# Patient Record
Sex: FEMALE | Race: WHITE | NOT HISPANIC OR LATINO | Employment: OTHER | URBAN - NONMETROPOLITAN AREA
[De-identification: names, ages, dates, MRNs, and addresses within clinical notes are randomized per-mention and may not be internally consistent; named-entity substitution may affect disease eponyms.]

---

## 2017-02-01 DIAGNOSIS — D05.11 DUCTAL CARCINOMA IN SITU OF RIGHT BREAST: ICD-10-CM

## 2017-02-01 DIAGNOSIS — E55.9 VITAMIN D DEFICIENCY: ICD-10-CM

## 2017-02-01 DIAGNOSIS — Z71.89 ACP (ADVANCE CARE PLANNING): ICD-10-CM

## 2017-02-01 DIAGNOSIS — E53.8 VITAMIN B12 DEFICIENCY (NON ANEMIC): ICD-10-CM

## 2017-02-01 DIAGNOSIS — D05.01 LOBULAR CARCINOMA IN SITU OF RIGHT BREAST: ICD-10-CM

## 2017-02-01 LAB
BASOPHILS # BLD AUTO: 0 10E9/L (ref 0–0.2)
BASOPHILS NFR BLD AUTO: 0.3 %
DIFFERENTIAL METHOD BLD: NORMAL
EOSINOPHIL # BLD AUTO: 0.1 10E9/L (ref 0–0.7)
EOSINOPHIL NFR BLD AUTO: 1.6 %
ERYTHROCYTE [DISTWIDTH] IN BLOOD BY AUTOMATED COUNT: 12.9 % (ref 10–15)
HCT VFR BLD AUTO: 39.4 % (ref 35–47)
HGB BLD-MCNC: 12.8 G/DL (ref 11.7–15.7)
LYMPHOCYTES # BLD AUTO: 3.2 10E9/L (ref 0.8–5.3)
LYMPHOCYTES NFR BLD AUTO: 41.8 %
MCH RBC QN AUTO: 29.3 PG (ref 26.5–33)
MCHC RBC AUTO-ENTMCNC: 32.5 G/DL (ref 31.5–36.5)
MCV RBC AUTO: 90 FL (ref 78–100)
MONOCYTES # BLD AUTO: 0.7 10E9/L (ref 0–1.3)
MONOCYTES NFR BLD AUTO: 9 %
NEUTROPHILS # BLD AUTO: 3.6 10E9/L (ref 1.6–8.3)
NEUTROPHILS NFR BLD AUTO: 47.3 %
PLATELET # BLD AUTO: 295 10E9/L (ref 150–450)
RBC # BLD AUTO: 4.37 10E12/L (ref 3.8–5.2)
WBC # BLD AUTO: 7.6 10E9/L (ref 4–11)

## 2017-02-01 PROCEDURE — 80053 COMPREHEN METABOLIC PANEL: CPT | Performed by: NURSE PRACTITIONER

## 2017-02-01 PROCEDURE — 99000 SPECIMEN HANDLING OFFICE-LAB: CPT | Performed by: NURSE PRACTITIONER

## 2017-02-01 PROCEDURE — 36415 COLL VENOUS BLD VENIPUNCTURE: CPT | Performed by: NURSE PRACTITIONER

## 2017-02-01 PROCEDURE — 83615 LACTATE (LD) (LDH) ENZYME: CPT | Performed by: NURSE PRACTITIONER

## 2017-02-01 PROCEDURE — 86300 IMMUNOASSAY TUMOR CA 15-3: CPT | Mod: 90 | Performed by: NURSE PRACTITIONER

## 2017-02-01 PROCEDURE — 85025 COMPLETE CBC W/AUTO DIFF WBC: CPT | Performed by: NURSE PRACTITIONER

## 2017-02-02 LAB
ALBUMIN SERPL-MCNC: 4.3 G/DL (ref 3.4–5)
ALP SERPL-CCNC: 39 U/L (ref 40–150)
ALT SERPL W P-5'-P-CCNC: 28 U/L (ref 0–50)
ANION GAP SERPL CALCULATED.3IONS-SCNC: 10 MMOL/L (ref 3–14)
AST SERPL W P-5'-P-CCNC: 14 U/L (ref 0–45)
BILIRUB SERPL-MCNC: 0.5 MG/DL (ref 0.2–1.3)
BUN SERPL-MCNC: 28 MG/DL (ref 7–30)
CALCIUM SERPL-MCNC: 9 MG/DL (ref 8.5–10.1)
CANCER AG27-29 SERPL-ACNC: 26 U/ML (ref 0–39)
CHLORIDE SERPL-SCNC: 104 MMOL/L (ref 94–109)
CO2 SERPL-SCNC: 24 MMOL/L (ref 20–32)
CREAT SERPL-MCNC: 0.78 MG/DL (ref 0.52–1.04)
GFR SERPL CREATININE-BSD FRML MDRD: 76 ML/MIN/1.7M2
GLUCOSE SERPL-MCNC: 98 MG/DL (ref 70–99)
LDH SERPL L TO P-CCNC: 195 U/L (ref 81–234)
POTASSIUM SERPL-SCNC: 4.1 MMOL/L (ref 3.4–5.3)
PROT SERPL-MCNC: 8.4 G/DL (ref 6.8–8.8)
SODIUM SERPL-SCNC: 138 MMOL/L (ref 133–144)

## 2017-02-09 ENCOUNTER — ONCOLOGY VISIT (OUTPATIENT)
Dept: ONCOLOGY | Facility: OTHER | Age: 57
End: 2017-02-09
Attending: NURSE PRACTITIONER
Payer: COMMERCIAL

## 2017-02-09 VITALS
OXYGEN SATURATION: 98 % | TEMPERATURE: 98.9 F | BODY MASS INDEX: 47.09 KG/M2 | HEIGHT: 66 IN | DIASTOLIC BLOOD PRESSURE: 92 MMHG | RESPIRATION RATE: 18 BRPM | HEART RATE: 95 BPM | WEIGHT: 293 LBS | SYSTOLIC BLOOD PRESSURE: 132 MMHG

## 2017-02-09 DIAGNOSIS — R10.32 LLQ ABDOMINAL PAIN: ICD-10-CM

## 2017-02-09 DIAGNOSIS — Z51.81 ENCOUNTER FOR MONITORING TAMOXIFEN THERAPY: ICD-10-CM

## 2017-02-09 DIAGNOSIS — D05.01 LOBULAR CARCINOMA IN SITU OF RIGHT BREAST: Primary | ICD-10-CM

## 2017-02-09 DIAGNOSIS — D05.11 DUCTAL CARCINOMA IN SITU OF RIGHT BREAST: ICD-10-CM

## 2017-02-09 DIAGNOSIS — R10.31 RLQ ABDOMINAL PAIN: ICD-10-CM

## 2017-02-09 DIAGNOSIS — F33.8 SEASONAL AFFECTIVE DISORDER (H): ICD-10-CM

## 2017-02-09 DIAGNOSIS — Z79.810 ENCOUNTER FOR MONITORING TAMOXIFEN THERAPY: ICD-10-CM

## 2017-02-09 PROCEDURE — 99214 OFFICE O/P EST MOD 30 MIN: CPT | Performed by: NURSE PRACTITIONER

## 2017-02-09 RX ORDER — ESCITALOPRAM OXALATE 10 MG/1
10 TABLET ORAL DAILY
Qty: 90 TABLET | Refills: 1 | Status: SHIPPED | OUTPATIENT
Start: 2017-02-09 | End: 2017-08-04

## 2017-02-09 ASSESSMENT — PAIN SCALES - GENERAL: PAINLEVEL: NO PAIN (0)

## 2017-02-09 NOTE — MR AVS SNAPSHOT
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   We will call with results before visit.  Start lexapro 10 mg daily.  Please get GYN exam before visit. When you are in need of a refill, please call your pharmacy and they will send us a request. If you have any questions please call [phone]        Follow-ups after your visit        Who to contact     If you have questions or need follow up information about today's clinic visit or your schedule please contact [facility] directly at [phone].  Normal or non-critical lab and imaging results will be communicated to you by Dashlane[facility], letter or phone within 4 business days after the clinic has received the results. If you do not hear from us within 7 days, please contact the clinic through [facility]Nowt or phone. If you have a critical or abnormal lab result, we will notify you by phone as soon as possible.  Submit refill requests through [facility] or call your pharmacy and they will forward the refill request to us. Please allow 3 business days for your refill to be completed.          Additional Information About Your Visit        [facility] Medical Information     [employer] [facility] lets you send messages to your doctor, view your test results, renew your prescriptions, schedule appointments and more. To sign up, go to [email or web address] . Click on \"Log in\" on the left side " "of the screen, which will take you to the Welcome page. Then click on \"Sign up Now\" on the right side of the page.     You will be asked to enter the access code listed below, as well as some personal information. Please follow the directions to create your username and password.     Your access code is: [email or web address]  Expires: 5/10/2017 10:47 AM     Your access code will  in 90 days. If you need help or a new code, please call your [facility] or [phone].        Care EveryWhere ID     This is your Care EveryWhere ID. This could be used by other organizations to access your [facility] medical records  [id number]        Your Vitals Were     Pulse Temperature Respirations Height BMI (Body Mass Index) Pulse Oximetry    95 98.9  F (37.2  C) (Tympanic) 18 1.676 m (5' 6\") 47.80 kg/m2 98%       Blood Pressure from Last 3 Encounters:   17 132/92   16 126/72   16 138/74    Weight from Last 3 Encounters:   17 134.265 kg (296 lb)   16 134.31 kg (296 lb 1.6 oz)   16 136.079 kg (300 lb)              We Performed the Following     CT Abdomen Pelvis w Contrast     US Pelvic Complete with Transvaginal          Today's Medication Changes          These changes are accurate as of: 17 10:48 AM.  If you have any questions, ask your nurse or doctor.               Start taking these medicines.        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  [address] [place] [id number]     Phone:  [phone]    - escitalopram 10 MG tablet             Primary Care Provider Office Phone # Fax #    Rivkato Blanco [phone]       [facility] [address] [place] [id number]        Thank you!     Thank you for choosing [facility]  for your care. Our goal is always to provide you with excellent care. Hearing back from our patients is one way we can continue to improve our services. Please take a few minutes to complete the written survey that you may receive in the mail after your visit with us. Thank you!             Your Updated Medication List - Protect others around you: Learn how to safely use, store and throw away your medicines at www.disposemymeds.org.          This list is accurate as of: 2/9/17 10:48 AM.  Always use your most recent med list.                   Brand Name Dispense Instructions for use    * albuterol 108 (90 BASE) MCG/ACT Inhaler    PROAIR HFA/PROVENTIL HFA/VENTOLIN HFA    1 Inhaler    Inhale 2 puffs into the lungs every 4 hours as needed for shortness of breath / dyspnea or wheezing       * albuterol (2.5 MG/3ML) 0.083% neb solution     1 Box    Take 1 vial (2.5 mg) by nebulization every 4 hours as needed for shortness of breath / dyspnea or wheezing       ASPIRIN PO      Take 81 mg by mouth daily       calcium-vitamin D 600-400 MG-UNIT per tablet    CALTRATE     Take 1 tablet by mouth 2 times daily       CARAFATE 1 GM tablet   Generic drug:  sucralfate      1 g 4 times daily as needed Take one tablet (1g) by oral route 4 times every day on an empty stomach one hour before meals and at bedtime       CVS vitamin  B12 1000 MCG Tabs   Generic drug:  cyanocobalamin     30 tablet    Take 1,000 mcg by mouth daily       DAILY MULTIVITAMIN PO      Take one tablet by oral route every day with food       doxylamine 25 MG Tabs tablet    UNISOM     Take 25 mg by mouth nightly as  needed       escitalopram 10 MG tablet    LEXAPRO    90 tablet    Take 1 tablet (10 mg) by mouth daily       folic acid 1 MG tablet    FOLVITE    90 tablet    Take 1 tablet (1 mg) by mouth daily       lisinopril 5 MG tablet    PRINIVIL/ZESTRIL    90 tablet    Take 1 Tab by mouth one time a day.       nystatin 316501 UNIT/GM Powd    MYCOSTATIN    60 g    Apply 100,000 g topically 2 times daily as needed       OMEGA 3 PO      Take 1,000 mg by mouth daily       tamoxifen 20 MG tablet    NOLVADEX    90 tablet    Take 1 Tab by mouth one time a day.       VITAMIN D (CHOLECALCIFEROL) PO      Take 5,000 Units by mouth daily       * Notice:  This list has 2 medication(s) that are the same as other medications prescribed for you. Read the directions carefully, and ask your doctor or other care provider to review them with you.

## 2017-02-09 NOTE — PROGRESS NOTES
Oncology Follow-up Visit:  February 9, 2017    Reason for Visit:  Patient presents with:  RECHECK: Follow up DCIS  *_* Health Care Directive *_*: Patient has paperwork but it has not been filled out yet     Nursing Note and documentation reviewed: yes    HPI:  This is a 56-year-old female patient who presents to the oncology/hematology clinic today in followup of breast cancer. Last visit was August 19, 2014. Patient was diagnosed with DCIS and LCIS in January 2014 and had a lumpectomy performed in February 2014. She was placed on tamoxifen 20 mg daily in March of 2014.   She continues on this.  She presents to the clinic today with complaints of lower abdominal cramping over the past month.  She does not associate this with eating or change in her stool habits and she denies any blood in her stool or rectal bleeding.  She denies vaginal bleeding or difficulty with urination and denies fevers.  Last GYN exam was completed January 2016 and was normal.  In addition, patient states she is feeling very emotional at this time.  She tells me she cries.  easily and is unsure why.  She denies any current life stressors or situations that would be contributing to her emotional lability.  She is currently not any type of antidepressant or antianxiety medication and states she remembers a time in the past with this was recommended that she did not start any therapy.  Mammogram was completed in December and was normal.      Oncologic History: Vandana had undergone an abnormal mammogram in November 2013 which showed microcalcifications suspicious for malignancy. She subsequently underwent stereotactic biopsy on November 21, 2013 with original interpretation of histopathology being that of hyperplasia without atypia. This was then over read as part of routine protocol in January 2014. 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 She was evaluated by Dr. Shanika Beaver in Gen. Surgery on January 28,  2014 and bilateral MRI of the breast was obtained which revealed oval-shaped nodular densities in both breasts consistent with inframammary lymph nodes.     The patient was seen by Dr. Chávez of radiation oncology on 1/31/2014 and his impression was that the patient had DCIS and would require definitive surgery.    The patient underwent excisional breast biopsy on 2/13/2014 and pathology showed LCIS, multifocal, completely excised and low-grade, grade 1, DCIS which was completely excised. Tumor size was at least 0.5 cm DCIS grade 1; margins were uninvolved by DCIS. Estrogen receptor was positive at 90% and progesterone receptor was positive at 50%. Patient was staged pathologic Tis associated with LCIS. She was started on tamoxifen.    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 1/28/2014     Ductal carcinoma in situ of right breast 1/28/2014     Other B-complex deficiencies 8/19/2014     Benign essential hypertension 11/24/2015     Vitamin D deficiency 1/26/2016     Primary central sleep apnea 1/26/2016       Past Surgical History   Procedure Laterality Date     Biopsy       right breast--neg     Orthopedic surgery       heel surgery     Orthopedic surgery       rt knee meniiscus tear     Cholecystectomy  2010     Colonoscopy  2009     Biopsy breast needle localization  2/13/2014     Procedure: BIOPSY BREAST NEEDLE LOCALIZATION;  WIRE LOCALIZED SEGMENTAL RESECTION, RIGHT BREAST;  Surgeon: Shanika Beaver MD;  Location: [facility] OR     Colonoscopy  11/12/2013        Family History   Problem Relation Age of Onset     C.A.D. Father      Thyroid Disease Other      DIABETES No family hx of      Asthma No family hx of        Social History     Social History     Marital Status: Single     Spouse Name: N/A     Number of Children: N/A     Years of Education: N/A     Occupational History     Not on file.     Social History Main Topics     Smoking status: Former Smoker -- 0.10 packs/day for 30 years     Types: Cigarettes     Quit date: 03/03/2013     Smokeless tobacco: Never Used     Alcohol Use: Yes     Drug Use: No     Sexual Activity:     Partners: Male     Other Topics Concern     Parent/Sibling W/ Cabg, Mi Or Angioplasty Before 65f 55m? No     Social History Narrative       Current Outpatient Prescriptions   Medication     doxylamine (UNISOM) 25 MG TABS tablet     escitalopram (LEXAPRO) 10 MG tablet     tamoxifen (NOLVADEX) 20 MG tablet     ASPIRIN PO     cyanocobalamin (CVS VITAMIN  B12) 1000 MCG TABS     lisinopril (PRINIVIL,ZESTRIL) 5 MG tablet     albuterol (PROAIR HFA, PROVENTIL HFA, VENTOLIN HFA) 108 (90 BASE) MCG/ACT inhaler     albuterol (2.5 MG/3ML) 0.083% nebulizer solution     calcium-vitamin D (CALTRATE) 600-400 MG-UNIT per tablet     Omega-3 Fatty Acids (OMEGA 3 PO)     VITAMIN D, CHOLECALCIFEROL, PO     Multiple Vitamin (DAILY MULTIVITAMIN PO)     nystatin (MYCOSTATIN) 127165 UNIT/GM POWD     folic acid (FOLVITE) 1 MG tablet     sucralfate (CARAFATE) 1 GM tablet     No current facility-administered medications for this visit.        Allergies   Allergen Reactions     Adhesive Tape      Steri strips left scar.       Review Of Systems:  Constitutional: denies fever, weight changes, chills, and night sweats.  Eyes: denies blurred or double vision  Ears/Nose/Throat: denies ear pain, nose problems, difficulty swallowing  Respiratory: denies shortness of breath, cough   Skin: denies rash, lesions  Breast/Chest wall: denies pain, lumps or discharge  Cardiovascular:  "denies chest pain, palpitations, edema  Gastrointestinal: see hPI  Genitourinary: denies difficulty with urination, blood in urine  Musculoskeletal:denies new muscle pain, bone pain  Neurologic: denies lightheadedness, headaches, numbness or tingling  Psychiatric: see HPI  Hematologic/Lymphatic/Immunologic: denies easy bruising, easy bleeding, lumps or bumps noted  Endocrine: Denies increased thirst; occasional hot flash    Physical Exam:  /92 mmHg  Pulse 95  Temp(Src) 98.9  F (37.2  C) (Tympanic)  Resp 18  Ht 1.676 m (5' 6\")  Wt 134.265 kg (296 lb)  BMI 47.80 kg/m2  SpO2 98%    GENERAL APPEARANCE: Healthy, alert and in no acute distress.    HEENT: Normocephalic, Sclerae anicteric. Oropharynx without ulcers, lesions, or thrush.  NECK: Supple. No asymmetry or masses, no thyromegaly.  LYMPHATICS: No palpable cervical, supraclavicular, axillary, or inguinal nodes   RESP: Lungs clear to auscultation bilaterally, respirations regular and easy  CARDIOVASCULAR: Regular rate and rhythm. Normal S1, S2  ABDOMEN: Soft, nontender. Bowel sounds auscultated all 4 quadrants. No palpable organomegaly or masses.  BREAST/Chest wall: no concerning lumps, masses or tenderness bilaterally; breasts are large and dense  MUSCULOSKELETAL: Extremities without gross deformities noted. No edema of bilateral lower extremities.  SKIN: No suspicious lesions or rashes.  NEURO: Alert and oriented x 3.  Gait steady.  PSYCHIATRIC: Mentation and affect appear normal.  Mood appropriate.  Teary eyed at times during visit.    Laboratory:  Results for orders placed or performed in visit on 02/01/17   Ca27.29  breast tumor marker   Result Value Ref Range    CA 27-29 26 0 - 39 U/mL   CBC with platelets differential   Result Value Ref Range    WBC 7.6 4.0 - 11.0 10e9/L    RBC Count 4.37 3.8 - 5.2 10e12/L    Hemoglobin 12.8 11.7 - 15.7 g/dL    Hematocrit 39.4 35.0 - 47.0 %    MCV 90 78 - 100 fl    MCH 29.3 26.5 - 33.0 pg    MCHC 32.5 31.5 - 36.5 " g/dL    RDW 12.9 10.0 - 15.0 %    Platelet Count 295 150 - 450 10e9/L    Diff Method Automated Method     % Neutrophils 47.3 %    % Lymphocytes 41.8 %    % Monocytes 9.0 %    % Eosinophils 1.6 %    % Basophils 0.3 %    Absolute Neutrophil 3.6 1.6 - 8.3 10e9/L    Absolute Lymphocytes 3.2 0.8 - 5.3 10e9/L    Absolute Monocytes 0.7 0.0 - 1.3 10e9/L    Absolute Eosinophils 0.1 0.0 - 0.7 10e9/L    Absolute Basophils 0.0 0.0 - 0.2 10e9/L   Comprehensive metabolic panel   Result Value Ref Range    Sodium 138 133 - 144 mmol/L    Potassium 4.1 3.4 - 5.3 mmol/L    Chloride 104 94 - 109 mmol/L    Carbon Dioxide 24 20 - 32 mmol/L    Anion Gap 10 3 - 14 mmol/L    Glucose 98 70 - 99 mg/dL    Urea Nitrogen 28 7 - 30 mg/dL    Creatinine 0.78 0.52 - 1.04 mg/dL    GFR Estimate 76 >60 mL/min/1.7m2    GFR Estimate If Black >90   GFR Calc   >60 mL/min/1.7m2    Calcium 9.0 8.5 - 10.1 mg/dL    Bilirubin Total 0.5 0.2 - 1.3 mg/dL    Albumin 4.3 3.4 - 5.0 g/dL    Protein Total 8.4 6.8 - 8.8 g/dL    Alkaline Phosphatase 39 (L) 40 - 150 U/L    ALT 28 0 - 50 U/L    AST 14 0 - 45 U/L   Lactate Dehydrogenase   Result Value Ref Range    Lactate Dehydrogenase 195 81 - 234 U/L       Imaging Studies:      BILATERAL SCREENING MAMMOGRAM WITH TOMOSYNTHESIS     REPORT: Scattered fibroglandular elements are noted. Tomographic  evaluation of both breasts reveals architectural distortion in the  right breast at the site of excisional biopsy.  Otherwise there are no  dominant masses or malignant calcifications seen. Comparison is made  with previous examination in 2015 reveals decreasing density at the  surgery site as compared to the previous examination.     IMPRESSION: BENIGN FINDINGS.  ANNUAL FOLLOW UP IS RECOMMENDED.     BI-RADS CATEGORY: 2 - Benign finding.     BREAST DENSITY: B - There are scattered areas of fibroglandular  density.     The examination was reviewed with R2 CAD.  Exam Date: Dec 28, 2016 02:11:31 PM  Author: RHONDA  MILAGROS  This report is final and null      ASSESSMENT/PLAN:    #1 Breast cancer: Ductal carcinoma in situ of the right breast and lobular carcinoma in situ of the right breast diagnosed January 2014. Patient underwent lumpectomy. She was then placed on tamoxifen 20mg daily and patient continues on this to date. We will follow up in 6 months with a CBC, CMP, LDH, CA 27.29.  Patient will have her screening mammogram in December 2017.    #2 Use of Tamoxifen: I did discuss the importance of obtaining a GYN exam (Bi-manual) on a yearly basis related to the tamoxifen use.   She will schedule this with Jackie Hitchcock CNP.   Instructed to seek evaluation with any vaginal bleeding.      #3  Abdominal pain:  Will obtain a UA and if negative we will get a vaginal ultrasound along with abdominal CT scan.  I will call her with the results and plan.    #4  SAD:  Her symptoms seem consistent with seasonal affective disorder.  Extensive time spent in discussion of this and treatment.  She will start Lexapro 10mg daily.  I explained that I would like her to follow up in 6 weeks and informed her I king talk with her PCP to see if she would be willing to follow up on this when she sees her for her annual exam.    I encouraged patient to call with any questions or concerns.      Matilde Patrick  FNP-BC

## 2017-02-09 NOTE — PATIENT INSTRUCTIONS
We would like to see you back after vaginal ultrasound and abdomen and pelvis CT results.   We will call with results before visit.  Start lexapro 10 mg daily.  Please get GYN exam before visit. When you are in need of a refill, please call your pharmacy and they will send us a request. If you have any questions please call [phone]

## 2017-02-10 DIAGNOSIS — R10.32 LLQ ABDOMINAL PAIN: ICD-10-CM

## 2017-02-10 DIAGNOSIS — F33.8 SEASONAL AFFECTIVE DISORDER (H): ICD-10-CM

## 2017-02-10 DIAGNOSIS — D05.11 DUCTAL CARCINOMA IN SITU OF RIGHT BREAST: ICD-10-CM

## 2017-02-10 DIAGNOSIS — Z79.810 ENCOUNTER FOR MONITORING TAMOXIFEN THERAPY: ICD-10-CM

## 2017-02-10 DIAGNOSIS — R10.31 RLQ ABDOMINAL PAIN: ICD-10-CM

## 2017-02-10 DIAGNOSIS — D05.01 LOBULAR CARCINOMA IN SITU OF RIGHT BREAST: ICD-10-CM

## 2017-02-10 DIAGNOSIS — Z51.81 ENCOUNTER FOR MONITORING TAMOXIFEN THERAPY: ICD-10-CM

## 2017-02-10 LAB
ALBUMIN UR-MCNC: NEGATIVE MG/DL
APPEARANCE UR: CLEAR
BILIRUB UR QL STRIP: NEGATIVE
COLOR UR AUTO: YELLOW
GLUCOSE UR STRIP-MCNC: NEGATIVE MG/DL
HGB UR QL STRIP: NEGATIVE
KETONES UR STRIP-MCNC: NEGATIVE MG/DL
LEUKOCYTE ESTERASE UR QL STRIP: NEGATIVE
NITRATE UR QL: NEGATIVE
NON-SQ EPI CELLS #/AREA URNS LPF: NORMAL /LPF
PH UR STRIP: 5 PH (ref 5–7)
RBC #/AREA URNS AUTO: NORMAL /HPF (ref 0–2)
SP GR UR STRIP: >1.03 (ref 1–1.03)
URN SPEC COLLECT METH UR: NORMAL
UROBILINOGEN UR STRIP-ACNC: 0.2 EU/DL (ref 0.2–1)
WBC #/AREA URNS AUTO: NORMAL /HPF (ref 0–2)

## 2017-02-10 PROCEDURE — 81001 URINALYSIS AUTO W/SCOPE: CPT | Performed by: NURSE PRACTITIONER

## 2017-02-10 ASSESSMENT — PATIENT HEALTH QUESTIONNAIRE - PHQ9: SUM OF ALL RESPONSES TO PHQ QUESTIONS 1-9: 7

## 2017-02-14 ENCOUNTER — TELEPHONE (OUTPATIENT)
Dept: ONCOLOGY | Facility: OTHER | Age: 57
End: 2017-02-14

## 2017-02-14 DIAGNOSIS — D05.01 LOBULAR CARCINOMA IN SITU OF RIGHT BREAST: ICD-10-CM

## 2017-02-14 DIAGNOSIS — Z79.810 USE OF TAMOXIFEN (NOLVADEX): ICD-10-CM

## 2017-02-14 DIAGNOSIS — R93.89 ENDOMETRIAL THICKENING ON ULTRA SOUND: Primary | ICD-10-CM

## 2017-02-14 DIAGNOSIS — D05.11 DUCTAL CARCINOMA IN SITU OF RIGHT BREAST: ICD-10-CM

## 2017-02-16 DIAGNOSIS — D05.11 DUCTAL CARCINOMA IN SITU OF RIGHT BREAST: ICD-10-CM

## 2017-02-17 RX ORDER — TAMOXIFEN CITRATE 20 MG/1
TABLET ORAL
Qty: 90 TABLET | Refills: 1 | Status: SHIPPED | OUTPATIENT
Start: 2017-02-17 | End: 2017-08-11

## 2017-03-06 ENCOUNTER — TRANSFERRED RECORDS (OUTPATIENT)
Dept: HEALTH INFORMATION MANAGEMENT | Facility: HOSPITAL | Age: 57
End: 2017-03-06

## 2017-03-06 LAB
HPV ABSTRACT: NORMAL
PAP-ABSTRACT: NORMAL

## 2017-03-14 DIAGNOSIS — D05.11 DUCTAL CARCINOMA IN SITU OF RIGHT BREAST: ICD-10-CM

## 2017-03-17 ENCOUNTER — TRANSFERRED RECORDS (OUTPATIENT)
Dept: HEALTH INFORMATION MANAGEMENT | Facility: HOSPITAL | Age: 57
End: 2017-03-17

## 2017-03-22 ENCOUNTER — TRANSFERRED RECORDS (OUTPATIENT)
Dept: HEALTH INFORMATION MANAGEMENT | Facility: HOSPITAL | Age: 57
End: 2017-03-22

## 2017-04-19 DIAGNOSIS — I10 BENIGN ESSENTIAL HYPERTENSION: ICD-10-CM

## 2017-04-19 NOTE — TELEPHONE ENCOUNTER
lisinopril      Last Written Prescription Date: 1/26/16  Last Fill Quantity: 90, # refills: 3  Last Office Visit with G, P or [facility] prescribing provider: 2/9/17       Potassium   Date Value Ref Range Status   02/01/2017 4.1 3.4 - 5.3 mmol/L Final     Creatinine   Date Value Ref Range Status   02/01/2017 0.78 0.52 - 1.04 mg/dL Final     BP Readings from Last 3 Encounters:   02/09/17 (!) 132/92   08/09/16 126/72   02/09/16 138/74

## 2017-04-20 RX ORDER — LISINOPRIL 5 MG/1
TABLET ORAL
Qty: 90 TABLET | Refills: 2 | Status: SHIPPED | OUTPATIENT
Start: 2017-04-20 | End: 2017-12-29

## 2017-04-26 ENCOUNTER — TELEPHONE (OUTPATIENT)
Dept: ONCOLOGY | Facility: OTHER | Age: 57
End: 2017-04-26

## 2017-04-26 NOTE — TELEPHONE ENCOUNTER
Called and LM for pt to call back.Pt was to have a 6 month follow up and will schedule if needed when pt returns phone call.    Milly Horne

## 2017-06-04 ENCOUNTER — TELEPHONE (OUTPATIENT)
Dept: INFUSION THERAPY | Facility: OTHER | Age: 57
End: 2017-06-04

## 2017-06-05 NOTE — TELEPHONE ENCOUNTER
Please call and see if patient is planning to follow up in August 2017.  I don't see that any appointment was made.  Let me know when she is on the schedule as she will need labs 1 week before in [facility].

## 2017-08-01 DIAGNOSIS — Z79.810 ENCOUNTER FOR MONITORING TAMOXIFEN THERAPY: ICD-10-CM

## 2017-08-01 DIAGNOSIS — R10.31 RLQ ABDOMINAL PAIN: ICD-10-CM

## 2017-08-01 DIAGNOSIS — D05.11 DUCTAL CARCINOMA IN SITU OF RIGHT BREAST: ICD-10-CM

## 2017-08-01 DIAGNOSIS — Z51.81 ENCOUNTER FOR MONITORING TAMOXIFEN THERAPY: ICD-10-CM

## 2017-08-01 DIAGNOSIS — F33.8 SEASONAL AFFECTIVE DISORDER (H): ICD-10-CM

## 2017-08-01 DIAGNOSIS — R10.32 LLQ ABDOMINAL PAIN: ICD-10-CM

## 2017-08-01 DIAGNOSIS — D05.01 LOBULAR CARCINOMA IN SITU OF RIGHT BREAST: ICD-10-CM

## 2017-08-01 LAB
BASOPHILS # BLD AUTO: 0 10E9/L (ref 0–0.2)
BASOPHILS NFR BLD AUTO: 0.3 %
DIFFERENTIAL METHOD BLD: NORMAL
EOSINOPHIL # BLD AUTO: 0.1 10E9/L (ref 0–0.7)
EOSINOPHIL NFR BLD AUTO: 2 %
ERYTHROCYTE [DISTWIDTH] IN BLOOD BY AUTOMATED COUNT: 12.7 % (ref 10–15)
HCT VFR BLD AUTO: 36.9 % (ref 35–47)
HGB BLD-MCNC: 12.1 G/DL (ref 11.7–15.7)
LYMPHOCYTES # BLD AUTO: 2.3 10E9/L (ref 0.8–5.3)
LYMPHOCYTES NFR BLD AUTO: 37.6 %
MCH RBC QN AUTO: 30.1 PG (ref 26.5–33)
MCHC RBC AUTO-ENTMCNC: 32.8 G/DL (ref 31.5–36.5)
MCV RBC AUTO: 92 FL (ref 78–100)
MONOCYTES # BLD AUTO: 0.6 10E9/L (ref 0–1.3)
MONOCYTES NFR BLD AUTO: 9 %
NEUTROPHILS # BLD AUTO: 3.1 10E9/L (ref 1.6–8.3)
NEUTROPHILS NFR BLD AUTO: 51.1 %
PLATELET # BLD AUTO: 269 10E9/L (ref 150–450)
RBC # BLD AUTO: 4.02 10E12/L (ref 3.8–5.2)
WBC # BLD AUTO: 6.1 10E9/L (ref 4–11)

## 2017-08-01 PROCEDURE — 80053 COMPREHEN METABOLIC PANEL: CPT | Performed by: NURSE PRACTITIONER

## 2017-08-01 PROCEDURE — 36415 COLL VENOUS BLD VENIPUNCTURE: CPT | Performed by: NURSE PRACTITIONER

## 2017-08-01 PROCEDURE — 85025 COMPLETE CBC W/AUTO DIFF WBC: CPT | Performed by: NURSE PRACTITIONER

## 2017-08-01 PROCEDURE — 86300 IMMUNOASSAY TUMOR CA 15-3: CPT | Mod: 90 | Performed by: NURSE PRACTITIONER

## 2017-08-01 PROCEDURE — 99000 SPECIMEN HANDLING OFFICE-LAB: CPT | Performed by: NURSE PRACTITIONER

## 2017-08-01 PROCEDURE — 83615 LACTATE (LD) (LDH) ENZYME: CPT | Performed by: NURSE PRACTITIONER

## 2017-08-02 LAB
ALBUMIN SERPL-MCNC: 4 G/DL (ref 3.4–5)
ALP SERPL-CCNC: 46 U/L (ref 40–150)
ALT SERPL W P-5'-P-CCNC: 31 U/L (ref 0–50)
ANION GAP SERPL CALCULATED.3IONS-SCNC: 8 MMOL/L (ref 3–14)
AST SERPL W P-5'-P-CCNC: 16 U/L (ref 0–45)
BILIRUB SERPL-MCNC: 0.2 MG/DL (ref 0.2–1.3)
BUN SERPL-MCNC: 20 MG/DL (ref 7–30)
CALCIUM SERPL-MCNC: 8.7 MG/DL (ref 8.5–10.1)
CANCER AG27-29 SERPL-ACNC: 23 U/ML (ref 0–39)
CHLORIDE SERPL-SCNC: 107 MMOL/L (ref 94–109)
CO2 SERPL-SCNC: 25 MMOL/L (ref 20–32)
CREAT SERPL-MCNC: 0.64 MG/DL (ref 0.52–1.04)
GFR SERPL CREATININE-BSD FRML MDRD: ABNORMAL ML/MIN/1.7M2
GLUCOSE SERPL-MCNC: 122 MG/DL (ref 70–99)
LDH SERPL L TO P-CCNC: 220 U/L (ref 81–234)
POTASSIUM SERPL-SCNC: 4.1 MMOL/L (ref 3.4–5.3)
PROT SERPL-MCNC: 7.8 G/DL (ref 6.8–8.8)
SODIUM SERPL-SCNC: 140 MMOL/L (ref 133–144)

## 2017-08-04 DIAGNOSIS — R10.32 LLQ ABDOMINAL PAIN: ICD-10-CM

## 2017-08-04 DIAGNOSIS — R10.31 RLQ ABDOMINAL PAIN: ICD-10-CM

## 2017-08-04 DIAGNOSIS — F33.8 SEASONAL AFFECTIVE DISORDER (H): ICD-10-CM

## 2017-08-04 DIAGNOSIS — D05.01 LOBULAR CARCINOMA IN SITU OF RIGHT BREAST: ICD-10-CM

## 2017-08-04 DIAGNOSIS — D05.11 DUCTAL CARCINOMA IN SITU OF RIGHT BREAST: ICD-10-CM

## 2017-08-04 DIAGNOSIS — Z79.810 ENCOUNTER FOR MONITORING TAMOXIFEN THERAPY: ICD-10-CM

## 2017-08-04 DIAGNOSIS — Z51.81 ENCOUNTER FOR MONITORING TAMOXIFEN THERAPY: ICD-10-CM

## 2017-08-04 NOTE — TELEPHONE ENCOUNTER
Lexapro     Last Written Prescription Date: 2/9/17  Last Fill Quantity: 90, # refills: 1  Last Office Visit with [facility] [place] care provider:  2/9/17   Next 5 appointments (look out 90 days)     Aug 08, 2017  3:00 PM CDT   (Arrive by 2:45 PM)   Return Visit with Matilde Patrick NP   [facility] ([facility] )    [address] [place] [address]   [phone]                   Last PHQ-9 score on record=   PHQ-9 SCORE 2/9/2017   Total Score -   Total Score 7

## 2017-08-08 ENCOUNTER — ONCOLOGY VISIT (OUTPATIENT)
Dept: ONCOLOGY | Facility: OTHER | Age: 57
End: 2017-08-08
Attending: NURSE PRACTITIONER
Payer: COMMERCIAL

## 2017-08-08 VITALS
SYSTOLIC BLOOD PRESSURE: 148 MMHG | HEIGHT: 66 IN | TEMPERATURE: 97.9 F | HEART RATE: 98 BPM | BODY MASS INDEX: 47.09 KG/M2 | WEIGHT: 293 LBS | RESPIRATION RATE: 16 BRPM | OXYGEN SATURATION: 97 % | DIASTOLIC BLOOD PRESSURE: 82 MMHG

## 2017-08-08 DIAGNOSIS — F33.8 SEASONAL AFFECTIVE DISORDER (H): ICD-10-CM

## 2017-08-08 DIAGNOSIS — D05.11 DUCTAL CARCINOMA IN SITU OF RIGHT BREAST: ICD-10-CM

## 2017-08-08 DIAGNOSIS — Z12.31 VISIT FOR SCREENING MAMMOGRAM: ICD-10-CM

## 2017-08-08 DIAGNOSIS — Z79.810 ENCOUNTER FOR MONITORING TAMOXIFEN THERAPY: ICD-10-CM

## 2017-08-08 DIAGNOSIS — D05.01 LOBULAR CARCINOMA IN SITU OF RIGHT BREAST: Primary | ICD-10-CM

## 2017-08-08 DIAGNOSIS — Z51.81 ENCOUNTER FOR MONITORING TAMOXIFEN THERAPY: ICD-10-CM

## 2017-08-08 PROCEDURE — 99214 OFFICE O/P EST MOD 30 MIN: CPT | Performed by: NURSE PRACTITIONER

## 2017-08-08 RX ORDER — ESCITALOPRAM OXALATE 10 MG/1
TABLET ORAL
Qty: 90 TABLET | Refills: 0 | Status: SHIPPED | OUTPATIENT
Start: 2017-08-08 | End: 2017-08-08

## 2017-08-08 RX ORDER — ESCITALOPRAM OXALATE 10 MG/1
TABLET ORAL
Qty: 90 TABLET | Refills: 1 | Status: SHIPPED | OUTPATIENT
Start: 2017-08-08 | End: 2018-04-20

## 2017-08-08 ASSESSMENT — PAIN SCALES - GENERAL: PAINLEVEL: NO PAIN (0)

## 2017-08-08 NOTE — MR AVS SNAPSHOT
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 Please come 1week(s) prior for lab work. You will be due for a mammo  In December of 2017    Your prescription has been sent to:   [facility] - [address] AT [address] [address]  Phone: [phone] Fax: [phone]  When you are in need of a refill of your medications, please call your pharmacy and they will send us the request. If you have any questions please call [phone]            Follow-ups after your visit        Your next 10 appointments already scheduled     Aug 08, 2017  3:00 PM CDT   (Arrive by 2:45 PM)   Return Visit with Matilde Patrick NP   [facility] ([facility] )    [address] [facility] [address]   [phone]            Dec 28, 2017 10:00 AM CST   MAMMOGRAM with  MAMMOGRAM [facility] )    [address] [facility] [address]   [phone]           Do not wear any body powder, lotions, deodorant or perfume the day of the exam. Bring a list of all medications, especially hormones.  If your last mammogram was not done at [place], please bring your mammogram films. We will need the name of your MD/PA to send a copy of your report.  "           Feb 01, 2018 10:00 AM CST   LAB with [facility]   [facility] [place] ([facility] [place] )    [address]  [place] [address]   [phone]            Feb 08, 2018 10:00 AM CST   (Arrive by 9:45 AM)   Return Visit with Matilde Patrick NP   [facility] [place] ([facility] [place] )    [address]  [place] [address]   [phone]              Future tests that were ordered for you today     Open Future Orders        Priority Expected Expires Ordered    CBC with platelets differential Routine 2/8/2018 3/8/2018 8/8/2017    Comprehensive metabolic panel Routine 2/8/2018 3/8/2018 8/8/2017    Lactate Dehydrogenase Routine 2/8/2018 3/8/2018 8/8/2017    Ca27.29  breast tumor marker Routine 2/8/2018 3/8/2018 8/8/2017            Who to contact     If you have questions or need follow up information about today's clinic visit or your schedule please contact [facility] directly at [phone].  Normal or non-critical lab and imaging results will be communicated to you by MyChart, letter or phone within 4 business days after the clinic has received the results. If you do not hear from us within 7 days, please contact the clinic through [employer] [place] [employer] or phone. If you have a critical or abnormal lab result, we will notify you by phone as soon as possible.  Submit refill requests through [employer] or call your pharmacy and they will forward the refill request to us. Please allow 3 business days for your refill to be completed.          Additional Information About Your Visit        MyChart Information     [employer] lets you send messages to your doctor, view your test results, renew your prescriptions, schedule appointments and more. To sign up, go to [email or web address] . Click on \"Log in\" on the left side of the screen, which will take you to the Welcome page. Then click on \"Sign up Now\" on the right side of the page.     You will be asked to " "enter the access code listed below, as well as some personal information. Please follow the directions to create your username and password.     Your access code is: [email or web address]  Expires: 2017  2:53 PM     Your access code will  in 90 days. If you need help or a new code, please call your [place] clinic or [phone].        Care EveryWhere ID     This is your Care EveryWhere ID. This could be used by other organizations to access your [place] medical records  [id number]        Your Vitals Were     Pulse Temperature Respirations Height Pulse Oximetry BMI (Body Mass Index)    98 97.9  F (36.6  C) (Oral) 16 1.676 m (5' 6\") 97% 47.94 kg/m2       Blood Pressure from Last 3 Encounters:   17 148/82   17 (!) 132/92   16 126/72    Weight from Last 3 Encounters:   17 134.7 kg (297 lb)   17 134.3 kg (296 lb)   16 134.3 kg (296 lb 1.6 oz)                 Today's Medication Changes          These changes are accurate as of: 17  2:53 PM.  If you have any questions, ask your nurse or doctor.               These medicines have changed or have updated prescriptions.        Dose/Directions    escitalopram 10 MG tablet   Commonly known as:  LEXAPRO   This may have changed:  See the new instructions.   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   Quantity:  90 tablet   Refills:  1            Where to get your medicines      These medications were sent to [facility] - [address] AT [address] [address]     Phone:  [phone]     escitalopram 10 MG tablet                Primary Care Provider Office Phone # Fax #    Rivka Blanco MD " [phone]       [facility] [address] [facility] [address]        Equal Access to Services     HAN MERAZ : Hadii aad ku hademilianobaylee Younger, waedmundojeff canomoniqueha, jerry kamendezda norisrichard, lb juliusin hayaabertin hammvitor day paul peter. So [facility] [phone].    ATENCIÓN: Si habla español, tiene a whiting disposición servicios gratuitos de asistencia lingüística. Llame al [phone].    We comply with applicable federal civil rights laws and [place] laws. We do not discriminate on the basis of race, color, national origin, age, disability sex, sexual orientation or gender identity.            Thank you!     Thank you for choosing [facility]  for your care. Our goal is always to provide you with excellent care. Hearing back from our patients is one way we can continue to improve our services. Please take a few minutes to complete the written survey that you may receive in the mail after your visit with us. Thank you!             Your Updated Medication List - Protect others around you: Learn how to safely use, store and throw away your medicines at www.disposemymeds.org.          This list is accurate as of: 8/8/17  2:53 PM.  Always use your most recent med list.                   Brand Name Dispense Instructions for use Diagnosis    * albuterol 108 (90 BASE) MCG/ACT Inhaler    PROAIR HFA/PROVENTIL HFA/VENTOLIN HFA    1 Inhaler    Inhale 2 puffs into the lungs every 4 hours as needed for shortness of breath / dyspnea or wheezing    Mild persistent asthma without complication       * albuterol (2.5 MG/3ML) 0.083% neb solution     1 Box    Take 1 vial (2.5 mg) by nebulization every 4 hours as needed for shortness of breath / dyspnea or wheezing    Mild persistent asthma without complication       ASPIRIN PO      Take 81 mg by mouth daily        calcium-vitamin D 600-400 MG-UNIT per tablet    CALTRATE     Take 1 tablet by mouth 2 times daily        [employer] vitamin  B12 1000 MCG  Tabs   Generic drug:  cyanocobalamin     30 tablet    Take 1,000 mcg by mouth daily        DAILY MULTIVITAMIN PO      Take one tablet by oral route every day with food        doxylamine 25 MG Tabs tablet    UNISOM     Take 25 mg by mouth nightly as needed    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    Benign essential hypertension       nystatin 719826 UNIT/GM Powd    MYCOSTATIN    60 g    Apply 100,000 g topically 2 times daily as needed    Candidiasis of skin and nails       OMEGA 3 PO      Take 1,000 mg by mouth daily        tamoxifen 20 MG tablet    NOLVADEX    90 tablet    Take 1 Tab by mouth one time a day.    Ductal carcinoma in situ of right breast       VITAMIN D (CHOLECALCIFEROL) PO      Take 5,000 Units by mouth daily        * Notice:  This list has 2 medication(s) that are the same as other medications prescribed for you. Read the directions carefully, and ask your doctor or other care provider to review them with you.

## 2017-08-08 NOTE — NURSING NOTE
"Chief Complaint   Patient presents with     RECHECK     DCIS       Initial /82  Pulse 98  Temp 97.9  F (36.6  C) (Oral)  Resp 16  Ht 1.676 m (5' 6\")  Wt 134.7 kg (297 lb)  SpO2 97%  BMI 47.94 kg/m2 Estimated body mass index is 47.94 kg/(m^2) as calculated from the following:    Height as of this encounter: 1.676 m (5' 6\").    Weight as of this encounter: 134.7 kg (297 lb).  Medication Reconciliation: complete     Kaur Villareal RN      "

## 2017-08-08 NOTE — PROGRESS NOTES
Oncology Follow-up Visit:  August 8, 2017    Reason for Visit:  Patient presents with:  RECHECK: DCIS     Nursing Note and documentation reviewed: yes    HPI: This is a 57-year-old female patient who presents to the oncology/hematology clinic today in followup of breast cancer.  Patient was diagnosed with DCIS and LCIS in January 2014 and had a lumpectomy performed in February 2014. She was placed on tamoxifen 20 mg daily in March of 2014.   She continues on this.  Patient presents to the clinic today stating she is feeling good.  She denies issues with fatigue.  She does have occasional hot flashes during the day but these are tolerable.  Escitalopram 10 mg was initiated at her last visit in February 2017 related to what I felt was SAD symptoms.  Patient states she is doing well on this medication and does not feel teary-eyed anymore and would like a refill on this.  She offers no complaints today.    She had complaints of abdominal cramping at her last visit and saw a GYN physician and underwent a hysteroscopy with D&C related to a thickened endometrial strip.      Oncologic History: Vandana had undergone an abnormal mammogram in November 2013 which showed microcalcifications suspicious for malignancy. She subsequently underwent stereotactic biopsy on November 21, 2013 with original interpretation of histopathology being that of hyperplasia without atypia. This was then over read as part of routine protocol in January 2014. 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 She was evaluated by Dr. Shanika Beaver in Gen. Surgery on January 28, 2014 and bilateral MRI of the breast was obtained which revealed oval-shaped nodular densities in both breasts consistent with inframammary lymph nodes.      The patient was seen by Dr. Chávez of radiation oncology on 1/31/2014 and his impression was that the patient had DCIS and would require definitive surgery.     The patient underwent excisional  breast biopsy on 2/13/2014 and pathology showed LCIS, multifocal, completely excised and low-grade, grade 1, DCIS which was completely excised. Tumor size was at least 0.5 cm DCIS grade 1; margins were uninvolved by DCIS. Estrogen receptor was positive at 90% and progesterone receptor was positive at 50%. Patient was staged pathologic Tis associated with LCIS. She was started on tamoxifen.     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 1/28/2014     Morbid obesity (H) 1/28/2014     Other B-complex deficiencies 8/19/2014     Other specified disease of hair and hair follicles      Primary central sleep apnea 1/26/2016     Unspecified asthma(493.90)      Unspecified immunity deficiency      Vitamin D deficiency 1/26/2016       Past Surgical History:   Procedure Laterality Date     BIOPSY      right breast--neg     BIOPSY BREAST NEEDLE LOCALIZATION  2/13/2014    Procedure: BIOPSY BREAST NEEDLE LOCALIZATION;  WIRE LOCALIZED SEGMENTAL RESECTION, RIGHT BREAST;  Surgeon: Shanika Beaver MD;  Location: [facility]     CHOLECYSTECTOMY  2010     COLONOSCOPY  2009     COLONOSCOPY  11/12/2013     hysterectomy with D and C  03/2017    [facility]     ORTHOPEDIC SURGERY      heel surgery     ORTHOPEDIC SURGERY      rt knee meniiscus tear       Family History   Problem Relation Age of Onset     C.A.D. Father      Thyroid Disease Other      DIABETES No family hx of      Asthma No family hx of        Social History     Social History     Marital status: Single     Spouse name: N/A     Number of children: N/A     Years of education: N/A      Occupational History     Not on file.     Social History Main Topics     Smoking status: Former Smoker     Packs/day: 0.10     Years: 30.00     Types: Cigarettes     Quit date: 3/3/2013     Smokeless tobacco: Never Used     Alcohol use Yes     Drug use: No     Sexual activity: Yes     Partners: Male     Other Topics Concern     Parent/Sibling W/ Cabg, Mi Or Angioplasty Before 65f 55m? No     Social History Narrative       Current Outpatient Prescriptions   Medication     escitalopram (LEXAPRO) 10 MG tablet     lisinopril (PRINIVIL/ZESTRIL) 5 MG tablet     tamoxifen (NOLVADEX) 20 MG tablet     doxylamine (UNISOM) 25 MG TABS tablet     ASPIRIN PO     cyanocobalamin ([employer]S VITAMIN  B12) 1000 MCG TABS     albuterol (PROAIR HFA, PROVENTIL HFA, VENTOLIN HFA) 108 (90 BASE) MCG/ACT inhaler     albuterol (2.5 MG/3ML) 0.083% nebulizer solution     calcium-vitamin D (CALTRATE) 600-400 MG-UNIT per tablet     nystatin (MYCOSTATIN) 858532 UNIT/GM POWD     Omega-3 Fatty Acids (OMEGA 3 PO)     VITAMIN D, CHOLECALCIFEROL, PO     Multiple Vitamin (DAILY MULTIVITAMIN PO)     [DISCONTINUED] escitalopram (LEXAPRO) 10 MG tablet     No current facility-administered medications for this visit.         Allergies   Allergen Reactions     Adhesive Tape      Steri strips left scar.       Review Of Systems:  Constitutional: denies fever, weight changes, chills, and night sweats.  Eyes: denies blurred or double vision  Ears/Nose/Throat: denies ear pain, nose problems, difficulty swallowing  Respiratory: denies shortness of breath, cough  Skin: denies rash, lesions  Breast/Chest wall: denies pain, lumps or discharge  Cardiovascular: denies chest pain, palpitations, edema  Gastrointestinal: denies abdominal pain, bloating, nausea, vomiting, early satiety  Genitourinary: denies difficulty with urination, blood in urine  Musculoskeletal:denies new muscle pain, bone pain  Neurologic: denies lightheadedness, headaches, numbness or  "tingling  Psychiatric: denies anxiety, depression  Hematologic/Lymphatic/Immunologic: denies easy bruising, easy bleeding, lumps or bumps noted  Endocrine: Denies increased thirst    Physical Exam:  /82  Pulse 98  Temp 97.9  F (36.6  C) (Oral)  Resp 16  Ht 1.676 m (5' 6\")  Wt 134.7 kg (297 lb)  SpO2 97%  BMI 47.94 kg/m2    GENERAL APPEARANCE: Healthy, alert and in no acute distress.  HEENT: Normocephalic, Sclerae anicteric. Oropharynx without ulcers, lesions, or thrush.  NECK: Supple. No asymmetry or masses, no thyromegaly.  LYMPHATICS: No palpable cervical, supraclavicular, axillary, or inguinal nodes   RESP: Lungs clear to auscultation bilaterally, respirations regular and easy  CARDIOVASCULAR: Regular rate and rhythm. Normal S1, S2; no murmur, gallop, or rub.  ABDOMEN: Soft, nontender. Bowel sounds auscultated all 4 quadrants. No palpable organomegaly or masses.  BREAST/Chest wall: no concerning lumps, masses or tenderness bilaterally  MUSCULOSKELETAL: Extremities without gross deformities noted. No edema of bilateral lower extremities.  SKIN: No suspicious lesions or rashes.  NEURO: Alert and oriented x 3.  Gait steady.  PSYCHIATRIC: Mentation and affect appear normal.  Mood appropriate.    Laboratory:  Results for orders placed or performed in visit on 08/01/17   CBC with platelets differential   Result Value Ref Range    WBC 6.1 4.0 - 11.0 10e9/L    RBC Count 4.02 3.8 - 5.2 10e12/L    Hemoglobin 12.1 11.7 - 15.7 g/dL    Hematocrit 36.9 35.0 - 47.0 %    MCV 92 78 - 100 fl    MCH 30.1 26.5 - 33.0 pg    MCHC 32.8 31.5 - 36.5 g/dL    RDW 12.7 10.0 - 15.0 %    Platelet Count 269 150 - 450 10e9/L    Diff Method Automated Method     % Neutrophils 51.1 %    % Lymphocytes 37.6 %    % Monocytes 9.0 %    % Eosinophils 2.0 %    % Basophils 0.3 %    Absolute Neutrophil 3.1 1.6 - 8.3 10e9/L    Absolute Lymphocytes 2.3 0.8 - 5.3 10e9/L    Absolute Monocytes 0.6 0.0 - 1.3 10e9/L    Absolute Eosinophils 0.1 0.0 - " 0.7 10e9/L    Absolute Basophils 0.0 0.0 - 0.2 10e9/L   Comprehensive metabolic panel   Result Value Ref Range    Sodium 140 133 - 144 mmol/L    Potassium 4.1 3.4 - 5.3 mmol/L    Chloride 107 94 - 109 mmol/L    Carbon Dioxide 25 20 - 32 mmol/L    Anion Gap 8 3 - 14 mmol/L    Glucose 122 (H) 70 - 99 mg/dL    Urea Nitrogen 20 7 - 30 mg/dL    Creatinine 0.64 0.52 - 1.04 mg/dL    GFR Estimate >90  Non  GFR Calc   >60 mL/min/1.7m2    GFR Estimate If Black >90   GFR Calc   >60 mL/min/1.7m2    Calcium 8.7 8.5 - 10.1 mg/dL    Bilirubin Total 0.2 0.2 - 1.3 mg/dL    Albumin 4.0 3.4 - 5.0 g/dL    Protein Total 7.8 6.8 - 8.8 g/dL    Alkaline Phosphatase 46 40 - 150 U/L    ALT 31 0 - 50 U/L    AST 16 0 - 45 U/L   Lactate Dehydrogenase   Result Value Ref Range    Lactate Dehydrogenase 220 81 - 234 U/L   Ca27.29  breast tumor marker   Result Value Ref Range    CA 27-29 23 0 - 39 U/mL       Imaging Studies:  None for today      ASSESSMENT/PLAN:    #1 Breast cancer: Ductal carcinoma in situ of the right breast and lobular carcinoma in situ of the right breast diagnosed January 2014. Patient underwent lumpectomy. She was then placed on tamoxifen 20mg daily and patient continues on this to date. We will follow up in 6 months with a CBC, CMP, LDH, CA 27.29.  Patient will have her screening mammogram in December 2018.    #2 Use of Tamoxifen: She will have another GYN exam in March 2018 and follow up with any vaginal complaints.    #3 SAD: We'll refill her prescription for escitalopram 10 mg daily.    I encouraged patient to call with any questions or concerns.        Matilde Patrick  [facility]

## 2017-08-08 NOTE — PATIENT INSTRUCTIONS
We would like to see you back in 6 months. Please come 1week(s) prior for lab work. You will be due for a mammo  In December of 2017    Your prescription has been sent to:   [facility] - [address] AT [address] [address]  Phone: [phone] Fax: [phone]  When you are in need of a refill of your medications, please call your pharmacy and they will send us the request. If you have any questions please call [phone]

## 2017-08-11 DIAGNOSIS — D05.11 DUCTAL CARCINOMA IN SITU OF RIGHT BREAST: ICD-10-CM

## 2017-08-11 RX ORDER — TAMOXIFEN CITRATE 20 MG/1
TABLET ORAL
Qty: 90 TABLET | Refills: 1 | Status: SHIPPED | OUTPATIENT
Start: 2017-08-11 | End: 2017-12-29

## 2017-08-18 DIAGNOSIS — J45.30 MILD PERSISTENT ASTHMA WITHOUT COMPLICATION: ICD-10-CM

## 2017-08-18 NOTE — TELEPHONE ENCOUNTER
Ventolin       Last Written Prescription Date: 11/24/15  Last Fill Quantity: 1, # refills: 3    Last Office Visit with [facility] or [facility] prescribing provider:  1/26/16   Future Office Visit:       Date of Last Asthma Action Plan Letter:   Asthma Action Plan Q1 Year    Topic Date Due     Asthma Action Plan - yearly  04[birth year]10/1965      Asthma Control Test: No flowsheet data found.    Date of Last Spirometry Test:   No results found for this or any previous visit.

## 2017-08-21 RX ORDER — ALBUTEROL SULFATE 90 UG/1
AEROSOL, METERED RESPIRATORY (INHALATION)
Qty: 18 G | Refills: 0 | Status: SHIPPED | OUTPATIENT
Start: 2017-08-21 | End: 2018-05-31

## 2017-12-28 ENCOUNTER — RADIANT APPOINTMENT (OUTPATIENT)
Dept: MAMMOGRAPHY | Facility: OTHER | Age: 57
End: 2017-12-28
Attending: NURSE PRACTITIONER
Payer: COMMERCIAL

## 2017-12-28 PROCEDURE — G0202 SCR MAMMO BI INCL CAD: HCPCS | Mod: TC

## 2017-12-28 PROCEDURE — 77063 BREAST TOMOSYNTHESIS BI: CPT | Mod: TC

## 2017-12-29 DIAGNOSIS — D05.11 DUCTAL CARCINOMA IN SITU OF RIGHT BREAST: ICD-10-CM

## 2017-12-29 NOTE — TELEPHONE ENCOUNTER
Tamoxifen      Last Written Prescription Date: 8/11/17  Last Fill Quantity: 90,  # refills: 1   Last Office Visit with [facility], [facility] or [facility] prescribing provider: 8/8/17                                         Next 5 appointments (look out 90 days)     Feb 13, 2018  1:30 PM CST   (Arrive by 1:15 PM)   Return Visit with Matilde Patrick NP   [facility] ([facility] )    [facility] [place] [address]   [phone]

## 2018-01-03 RX ORDER — TAMOXIFEN CITRATE 20 MG/1
TABLET ORAL
Qty: 90 TABLET | Refills: 1 | Status: SHIPPED | OUTPATIENT
Start: 2018-01-03 | End: 2018-08-10

## 2018-01-22 DIAGNOSIS — I10 BENIGN ESSENTIAL HYPERTENSION: ICD-10-CM

## 2018-01-22 NOTE — LETTER
[facility] [place]  [address] [place] [address]                Vandana Jaeger  [address] [place] [address]      February 16, 2018       Dear Vandana Jaeger,    APPOINTMENT REMINDER:       Our record indicates that it is time for you to be seen for an office visit with .  You may call our office at [phone] to schedule an appointment for an exam.  Please disregard this notice if you have already made an appointment.      Sincerely,    MD Darek

## 2018-01-22 NOTE — TELEPHONE ENCOUNTER
I am listed as primary, but patient has seen Jackie for her last few visits (physical on 1/26/16).  She is due for appointment.  Please call her to schedule with either myself or Jackie, then medication can be refilled.

## 2018-01-22 NOTE — TELEPHONE ENCOUNTER
Lisinopril  Last Written Prescription Date: 1/3/18  Last Fill Quantity: 30 # of Refills: 0  Last Office Visit: 1/26/16

## 2018-01-23 RX ORDER — LISINOPRIL 5 MG/1
TABLET ORAL
Qty: 30 TABLET | Refills: 0 | Status: SHIPPED | OUTPATIENT
Start: 2018-01-23 | End: 2018-02-20

## 2018-01-31 NOTE — TELEPHONE ENCOUNTER
[facility] requesting return call to schedule follow up visit with either Jackie Hitchcock or Dr. Zuniga.

## 2018-02-14 DIAGNOSIS — D05.01 LOBULAR CARCINOMA IN SITU OF RIGHT BREAST: ICD-10-CM

## 2018-02-14 DIAGNOSIS — D05.11 DUCTAL CARCINOMA IN SITU OF RIGHT BREAST: ICD-10-CM

## 2018-02-14 DIAGNOSIS — Z12.31 VISIT FOR SCREENING MAMMOGRAM: ICD-10-CM

## 2018-02-14 LAB
BASOPHILS # BLD AUTO: 0 10E9/L (ref 0–0.2)
BASOPHILS NFR BLD AUTO: 0.4 %
DIFFERENTIAL METHOD BLD: NORMAL
EOSINOPHIL # BLD AUTO: 0.1 10E9/L (ref 0–0.7)
EOSINOPHIL NFR BLD AUTO: 1 %
ERYTHROCYTE [DISTWIDTH] IN BLOOD BY AUTOMATED COUNT: 13.2 % (ref 10–15)
HCT VFR BLD AUTO: 39.2 % (ref 35–47)
HGB BLD-MCNC: 12.9 G/DL (ref 11.7–15.7)
LYMPHOCYTES # BLD AUTO: 2.9 10E9/L (ref 0.8–5.3)
LYMPHOCYTES NFR BLD AUTO: 37.3 %
MCH RBC QN AUTO: 29.8 PG (ref 26.5–33)
MCHC RBC AUTO-ENTMCNC: 32.9 G/DL (ref 31.5–36.5)
MCV RBC AUTO: 91 FL (ref 78–100)
MONOCYTES # BLD AUTO: 0.7 10E9/L (ref 0–1.3)
MONOCYTES NFR BLD AUTO: 9.6 %
NEUTROPHILS # BLD AUTO: 4 10E9/L (ref 1.6–8.3)
NEUTROPHILS NFR BLD AUTO: 51.7 %
PLATELET # BLD AUTO: 292 10E9/L (ref 150–450)
RBC # BLD AUTO: 4.33 10E12/L (ref 3.8–5.2)
WBC # BLD AUTO: 7.7 10E9/L (ref 4–11)

## 2018-02-14 PROCEDURE — 85025 COMPLETE CBC W/AUTO DIFF WBC: CPT | Performed by: NURSE PRACTITIONER

## 2018-02-14 PROCEDURE — 83615 LACTATE (LD) (LDH) ENZYME: CPT | Performed by: NURSE PRACTITIONER

## 2018-02-14 PROCEDURE — 86300 IMMUNOASSAY TUMOR CA 15-3: CPT | Mod: 90 | Performed by: NURSE PRACTITIONER

## 2018-02-14 PROCEDURE — 80053 COMPREHEN METABOLIC PANEL: CPT | Performed by: NURSE PRACTITIONER

## 2018-02-14 PROCEDURE — 36415 COLL VENOUS BLD VENIPUNCTURE: CPT | Performed by: NURSE PRACTITIONER

## 2018-02-14 PROCEDURE — 99000 SPECIMEN HANDLING OFFICE-LAB: CPT | Performed by: NURSE PRACTITIONER

## 2018-02-15 LAB
ALBUMIN SERPL-MCNC: 4.4 G/DL (ref 3.4–5)
ALP SERPL-CCNC: 39 U/L (ref 40–150)
ALT SERPL W P-5'-P-CCNC: 31 U/L (ref 0–50)
ANION GAP SERPL CALCULATED.3IONS-SCNC: 8 MMOL/L (ref 3–14)
AST SERPL W P-5'-P-CCNC: 15 U/L (ref 0–45)
BILIRUB SERPL-MCNC: 0.3 MG/DL (ref 0.2–1.3)
BUN SERPL-MCNC: 27 MG/DL (ref 7–30)
CALCIUM SERPL-MCNC: 9 MG/DL (ref 8.5–10.1)
CANCER AG27-29 SERPL-ACNC: 24 U/ML (ref 0–39)
CHLORIDE SERPL-SCNC: 104 MMOL/L (ref 94–109)
CO2 SERPL-SCNC: 25 MMOL/L (ref 20–32)
CREAT SERPL-MCNC: 0.81 MG/DL (ref 0.52–1.04)
GFR SERPL CREATININE-BSD FRML MDRD: 72 ML/MIN/1.7M2
GLUCOSE SERPL-MCNC: 93 MG/DL (ref 70–99)
LDH SERPL L TO P-CCNC: 209 U/L (ref 81–234)
POTASSIUM SERPL-SCNC: 3.9 MMOL/L (ref 3.4–5.3)
PROT SERPL-MCNC: 8.3 G/DL (ref 6.8–8.8)
SODIUM SERPL-SCNC: 137 MMOL/L (ref 133–144)

## 2018-02-20 ENCOUNTER — OFFICE VISIT (OUTPATIENT)
Dept: FAMILY MEDICINE | Facility: OTHER | Age: 58
End: 2018-02-20
Attending: NURSE PRACTITIONER
Payer: COMMERCIAL

## 2018-02-20 VITALS
RESPIRATION RATE: 14 BRPM | DIASTOLIC BLOOD PRESSURE: 80 MMHG | WEIGHT: 293 LBS | HEART RATE: 80 BPM | TEMPERATURE: 98 F | BODY MASS INDEX: 47.09 KG/M2 | SYSTOLIC BLOOD PRESSURE: 140 MMHG | HEIGHT: 66 IN

## 2018-02-20 DIAGNOSIS — Z00.00 ROUTINE GENERAL MEDICAL EXAMINATION AT A HEALTH CARE FACILITY: Primary | ICD-10-CM

## 2018-02-20 DIAGNOSIS — E55.9 VITAMIN D DEFICIENCY: ICD-10-CM

## 2018-02-20 DIAGNOSIS — R82.79 ABNORMAL FINDINGS ON MICROBIOLOGICAL EXAMINATION OF URINE: ICD-10-CM

## 2018-02-20 DIAGNOSIS — I10 BENIGN ESSENTIAL HYPERTENSION: ICD-10-CM

## 2018-02-20 DIAGNOSIS — E66.01 MORBID OBESITY (H): ICD-10-CM

## 2018-02-20 PROBLEM — Z79.810 USE OF TAMOXIFEN (NOLVADEX): Status: ACTIVE | Noted: 2018-02-20

## 2018-02-20 LAB
ALBUMIN UR-MCNC: NEGATIVE MG/DL
APPEARANCE UR: CLEAR
BACTERIA #/AREA URNS HPF: ABNORMAL /HPF
BILIRUB UR QL STRIP: NEGATIVE
CHOLEST SERPL-MCNC: 172 MG/DL
COLOR UR AUTO: YELLOW
GLUCOSE UR STRIP-MCNC: NEGATIVE MG/DL
HDLC SERPL-MCNC: 50 MG/DL
HGB UR QL STRIP: ABNORMAL
KETONES UR STRIP-MCNC: NEGATIVE MG/DL
LDLC SERPL CALC-MCNC: 99 MG/DL
LEUKOCYTE ESTERASE UR QL STRIP: ABNORMAL
NITRATE UR QL: NEGATIVE
NON-SQ EPI CELLS #/AREA URNS LPF: ABNORMAL /LPF
NONHDLC SERPL-MCNC: 122 MG/DL
PH UR STRIP: 5.5 PH (ref 5–7)
RBC #/AREA URNS AUTO: ABNORMAL /HPF
SOURCE: ABNORMAL
SP GR UR STRIP: 1.02 (ref 1–1.03)
TRIGL SERPL-MCNC: 113 MG/DL
TSH SERPL DL<=0.005 MIU/L-ACNC: 0.95 MU/L (ref 0.4–4)
UROBILINOGEN UR STRIP-ACNC: 0.2 EU/DL (ref 0.2–1)
WBC #/AREA URNS AUTO: ABNORMAL /HPF

## 2018-02-20 PROCEDURE — 81001 URINALYSIS AUTO W/SCOPE: CPT | Performed by: NURSE PRACTITIONER

## 2018-02-20 PROCEDURE — 36415 COLL VENOUS BLD VENIPUNCTURE: CPT | Performed by: NURSE PRACTITIONER

## 2018-02-20 PROCEDURE — 80061 LIPID PANEL: CPT | Performed by: NURSE PRACTITIONER

## 2018-02-20 PROCEDURE — 87086 URINE CULTURE/COLONY COUNT: CPT | Performed by: NURSE PRACTITIONER

## 2018-02-20 PROCEDURE — 84443 ASSAY THYROID STIM HORMONE: CPT | Performed by: NURSE PRACTITIONER

## 2018-02-20 PROCEDURE — 99000 SPECIMEN HANDLING OFFICE-LAB: CPT | Performed by: NURSE PRACTITIONER

## 2018-02-20 PROCEDURE — 82306 VITAMIN D 25 HYDROXY: CPT | Mod: 90 | Performed by: NURSE PRACTITIONER

## 2018-02-20 PROCEDURE — 99396 PREV VISIT EST AGE 40-64: CPT | Performed by: NURSE PRACTITIONER

## 2018-02-20 RX ORDER — LISINOPRIL 10 MG/1
TABLET ORAL
Qty: 90 TABLET | Refills: 1 | Status: SHIPPED | OUTPATIENT
Start: 2018-02-20 | End: 2018-08-10

## 2018-02-20 ASSESSMENT — ANXIETY QUESTIONNAIRES
7. FEELING AFRAID AS IF SOMETHING AWFUL MIGHT HAPPEN: NOT AT ALL
5. BEING SO RESTLESS THAT IT IS HARD TO SIT STILL: NOT AT ALL
3. WORRYING TOO MUCH ABOUT DIFFERENT THINGS: SEVERAL DAYS
GAD7 TOTAL SCORE: 2
4. TROUBLE RELAXING: NOT AT ALL
1. FEELING NERVOUS, ANXIOUS, OR ON EDGE: NOT AT ALL
6. BECOMING EASILY ANNOYED OR IRRITABLE: NOT AT ALL
IF YOU CHECKED OFF ANY PROBLEMS ON THIS QUESTIONNAIRE, HOW DIFFICULT HAVE THESE PROBLEMS MADE IT FOR YOU TO DO YOUR WORK, TAKE CARE OF THINGS AT HOME, OR GET ALONG WITH OTHER PEOPLE: NOT DIFFICULT AT ALL
2. NOT BEING ABLE TO STOP OR CONTROL WORRYING: SEVERAL DAYS

## 2018-02-20 NOTE — LETTER
My Depression Action Plan  Name: Vandana Jaeger   Date of Birth 1960  Date: 2/20/2018    My doctor: Rivka Blanco   My clinic: [facility]  [address] [place] [address] [address]  [place] [address]  [phone]          GREEN    ZONE   Good Control    What it looks like:     Things are going generally well. You have normal up s and down s. You may even feel depressed from time to time, but bad moods usually last less than a day.   What you need to do:  1. Continue to care for yourself (see self care plan)  2. Check your depression survival kit and update it as needed  3. Follow your physician s recommendations including any medication.  4. Do not stop taking medication unless you consult with your physician first.           YELLOW         ZONE Getting Worse    What it looks like:     Depression is starting to interfere with your life.     It may be hard to get out of bed; you may be starting to isolate yourself from others.    Symptoms of depression are starting to last most all day and this has happened for several days.     You may have suicidal thoughts but they are not constant.   What you need to do:     1. Call your care team, your response to treatment will improve if you keep your care team informed of your progress. Yellow periods are signs an adjustment may need to be made.     2. Continue your self-care, even if you have to fake it!    3. Talk to someone in your support network    4. Open up your depression survival kit           RED    ZONE Medical Alert - Get Help    What it looks like:     Depression is seriously interfering with your life.     You may experience these or other symptoms: You can t get out of bed most days, can t work or engage in other necessary activities, you have trouble taking care of basic hygiene, or basic responsibilities, thoughts of suicide or death that will not go away, self-injurious behavior.     What you need to do:  1. Call your care team and  request a same-day appointment. If they are not available (weekends or after hours) call your local crisis line, emergency room or 911.      Electronically signed by: Lidia Rodas, February 20, 2018    Depression Self Care Plan / Survival Kit    Self-Care for Depression  Here s the deal. Your body and mind are really not as separate as most people think.  What you do and think affects how you feel and how you feel influences what you do and think. This means if you do things that people who feel good do, it will help you feel better.  Sometimes this is all it takes.  There is also a place for medication and therapy depending on how severe your depression is, so be sure to consult with your medical provider and/ or Behavioral Health Consultant if your symptoms are worsening or not improving.     In order to better manage my stress, I will:    Exercise  Get some form of exercise, every day. This will help reduce pain and release endorphins, the  feel good  chemicals in your brain. This is almost as good as taking antidepressants!  This is not the same as joining a gym and then never going! (they count on that by the way ) It can be as simple as just going for a walk or doing some gardening, anything that will get you moving.      Hygiene   Maintain good hygiene (Get out of bed in the morning, Make your bed, Brush your teeth, Take a shower, and Get dressed like you were going to work, even if you are unemployed).  If your clothes don't fit try to get ones that do.    Diet  I will strive to eat foods that are good for me, drink plenty of water, and avoid excessive sugar, caffeine, alcohol, and other mood-altering substances.  Some foods that are helpful in depression are: complex carbohydrates, B vitamins, flaxseed, fish or fish oil, fresh fruits and vegetables.    Psychotherapy  I agree to participate in Individual Therapy (if recommended).    Medication  If prescribed medications, I agree to take them.  Missing  doses can result in serious side effects.  I understand that drinking alcohol, or other illicit drug use, may cause potential side effects.  I will not stop my medication abruptly without first discussing it with my provider.    Staying Connected With Others  I will stay in touch with my friends, family members, and my primary care provider/team.    Use your imagination  Be creative.  We all have a creative side; it doesn t matter if it s oil painting, sand castles, or mud pies! This will also kick up the endorphins.    Witness Beauty  (AKA stop and smell the roses) Take a look outside, even in mid-winter. Notice colors, textures. Watch the squirrels and birds.     Service to others  Be of service to others.  There is always someone else in need.  By helping others we can  get out of ourselves  and remember the really important things.  This also provides opportunities for practicing all the other parts of the program.    Humor  Laugh and be silly!  Adjust your TV habits for less news and crime-drama and more comedy.    Control your stress  Try breathing deep, massage therapy, biofeedback, and meditation. Find time to relax each day.     My support system    Clinic Contact:  Phone number:    Contact 1:  Phone number:    Contact 2:  Phone number:    Anglican/:  Phone number:    Therapist:  Phone number:    Local crisis center:    Phone number:    Other community support:  Phone number:

## 2018-02-20 NOTE — PROGRESS NOTES
"   SUBJECTIVE:   CC: Vandana Jaeger is an 57 year old woman who presents for preventive health visit.     Healthy Habits:    Do you get at least three servings of calcium containing foods daily (dairy, green leafy vegetables, etc.)? Yes, and multivitamin, vitaminD, calcium    Amount of exercise or daily activities, outside of work: busy at work    Problems taking medications regularly No    Medication side effects: No    Have you had an eye exam in the past two years? no    Do you see a dentist twice per year? no    Do you have sleep apnea, excessive snoring or daytime drowsiness?no      Hypertension Follow-up    Outpatient blood pressures are not being checked.    Low Salt Diet: not monitoring salt    Depression Followup    Status since last visit: Stable     See PHQ-9 for current symptoms.  Other associated symptoms: None    Complicating factors:   Significant life event:  No   Current substance abuse:  None  Anxiety or Panic symptoms:  No      Vitamin D deficiency - lab due    Weight gain - interested in \"diet pills\"    Vaginal dryness - will need to discuss with oncology due to breast cancer history    History of breast cancer   sees Oncology - appt upcoming tomorrow including breast exam    Per Oncology note:  Oncologic History: Vandana had undergone an abnormal mammogram in November 2013 which showed microcalcifications suspicious for malignancy. She subsequently underwent stereotactic biopsy on November 21, 2013 with original interpretation of histopathology being that of hyperplasia without atypia. This was then over read as part of routine protocol in January 2014. 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 She was evaluated by Dr. Shanika Beaver in Gen. Surgery on January 28, 2014 and bilateral MRI of the breast was obtained which revealed oval-shaped nodular densities in both breasts consistent with inframammary lymph nodes.       The patient was seen by Dr. Chávez of " radiation oncology on 1/31/2014 and his impression was that the patient had DCIS and would require definitive surgery.      The patient underwent excisional breast biopsy on 2/13/2014 and pathology showed LCIS, multifocal, completely excised and low-grade, grade 1, DCIS which was completely excised. Tumor size was at least 0.5 cm DCIS grade 1; margins were uninvolved by DCIS. Estrogen receptor was positive at 90% and progesterone receptor was positive at 50%. Patient was staged pathologic Tis associated with LCIS. She was started on tamoxifen.      Current Chemo Regime/TX: Tamoxifen 20 mg daily started March 2014  Current Cycle: n/a  # of completed cycles: n/a      PHQ-9 SCORE 1/26/2016 2/9/2017 2/20/2018   Total Score - - -   Total Score 3 7 1     REUBEN-7 SCORE 2/20/2018   Total Score 2       PHQ-9  English  PHQ-9   Any Language  Suicide Assessment Five-step Evaluation and Treatment (SAFE-T)        Acute Illness   Acute illness concerns: cough - productive  Onset: days    Fever: no    Chills/Sweats: no    Headache (location?): no    Sinus Pressure:no    Conjunctivitis:  no    Ear Pain: no    Rhinorrhea: no    Congestion: no    Sore Throat: no     Cough: YES    Wheeze: no    Decreased Appetite: no    Nausea: no    Vomiting: no    Diarrhea:  no    Dysuria/Freq.: no    Fatigue/Achiness: no    Sick/Strep Exposure: no           Amount of exercise or physical activity: None    Problems taking medications regularly: No    Medication side effects: none    Diet: regular (no restrictions)      Abuse: Current or Past(Physical, Sexual or Emotional)- No  Do you feel safe in your environment - Yes    Social History   Substance Use Topics     Smoking status: Former Smoker     Packs/day: 0.10     Years: 30.00     Types: Cigarettes     Quit date: 3/3/2013     Smokeless tobacco: Never Used     Alcohol use Yes     If you drink alcohol do you typically have >3 drinks per day or >7 drinks per week? No                       Reviewed  orders with patient.  Reviewed health maintenance and updated orders accordingly - Yes  Labs reviewed in EPIC  BP Readings from Last 3 Encounters:   02/20/18 140/80   08/08/17 148/82   02/09/17 (!) 132/92    Wt Readings from Last 3 Encounters:   02/20/18 295 lb (133.8 kg)   08/08/17 297 lb (134.7 kg)   02/09/17 296 lb (134.3 kg)                  Patient Active Problem List   Diagnosis     Ductal carcinoma in situ of right breast     Morbid obesity (H)     Reactive airway disease that is not asthma     Contact dermatitis and other eczema, due to unspecified cause     Benign essential hypertension     Vitamin D deficiency     Primary central sleep apnea     ACP (advance care planning)     Past Surgical History:   Procedure Laterality Date     BIOPSY      right breast--neg     BIOPSY BREAST NEEDLE LOCALIZATION  2/13/2014    Procedure: BIOPSY BREAST NEEDLE LOCALIZATION;  WIRE LOCALIZED SEGMENTAL RESECTION, RIGHT BREAST;  Surgeon: Shanika Beaver MD;  Location: [facility]     CHOLECYSTECTOMY  2010     COLONOSCOPY  2009     COLONOSCOPY  11/12/2013     hysterectomy with D and C  03/2017    [facility]     ORTHOPEDIC SURGERY      heel surgery     ORTHOPEDIC SURGERY      rt knee meniiscus tear       Social History   Substance Use Topics     Smoking status: Former Smoker     Packs/day: 0.10     Years: 30.00     Types: Cigarettes     Quit date: 3/3/2013     Smokeless tobacco: Never Used     Alcohol use Yes     Family History   Problem Relation Age of Onset     C.A.D. Father      Thyroid Disease Other      DIABETES No family hx of      Asthma No family hx of          Current Outpatient Prescriptions   Medication Sig Dispense Refill     Acetaminophen (TYLENOL PO) Take 325 mg by mouth every 4 hours as needed for mild pain or fever       lisinopril (PRINIVIL/ZESTRIL) 5 MG tablet Take 1 Tab by mouth one time a day. 30 tablet 0     tamoxifen (NOLVADEX) 20 MG tablet Take 1 Tab by mouth one time a day. 90 tablet 1     VENTOLIN  (90  BASE) MCG/ACT Inhaler Inhale 2 puffs into the lungs every 4 hours as needed for shortness of breath / dyspnea or wheezing 18 g 0     escitalopram (LEXAPRO) 10 MG tablet Take 1 Tab by mouth one time a day. 90 tablet 1     ASPIRIN PO Take 81 mg by mouth daily       cyanocobalamin ([employer] VITAMIN  B12) 1000 MCG TABS Take 1,000 mcg by mouth daily 30 tablet      albuterol (2.5 MG/3ML) 0.083% nebulizer solution Take 1 vial (2.5 mg) by nebulization every 4 hours as needed for shortness of breath / dyspnea or wheezing 1 Box 3     calcium-vitamin D (CALTRATE) 600-400 MG-UNIT per tablet Take 1 tablet by mouth 2 times daily       nystatin (MYCOSTATIN) 188832 UNIT/GM POWD Apply 100,000 g topically 2 times daily as needed 60 g 2     Omega-3 Fatty Acids (OMEGA 3 PO) Take 1,000 mg by mouth daily        VITAMIN D, CHOLECALCIFEROL, PO Take 5,000 Units by mouth daily        Multiple Vitamin (DAILY MULTIVITAMIN PO) Take one tablet by oral route every day with food       Allergies   Allergen Reactions     Adhesive Tape      Steri strips left scar.     Recent Labs   Lab Test  02/14/18   1638  08/01/17   1542  02/01/17   1551   02/05/16   0802   03/02/15   1548   05/16/14   1040   LDL   --    --    --    --    --    --   80   --    --    HDL   --    --    --    --    --    --   45*   --    --    TRIG   --    --    --    --    --    --   175*   --    --    ALT  31  31  28   < >   --    < >  30   < >   --    CR  0.81  0.64  0.78   < >  0.55   < >  0.61   < >   --    GFRESTIMATED  72  >90  Non  GFR Calc    76   < >  >90  Non  GFR Calc     < >  >90  Non  GFR Calc     < >   --    GFRESTBLACK  87  >90   GFR Calc    >90   GFR Calc     < >  >90   GFR Calc     < >  >90   GFR Calc     < >   --    POTASSIUM  3.9  4.1  4.1   < >  3.8   < >  3.9   < >   --    TSH   --    --    --    --   1.32   --    --    --   1.15    < > = values in this  interval not displayed.        UTD Mammogram    EXAM: MA SCREENING BILATERAL W/ VIKA, 12/28/2017 10:33 AM     COMPARISONS: 12/28/2016, 12/16/2015 and 11/24/2014.     HISTORY: screening mammogram/h/o LCIS and DCIS; Lobular carcinoma in  situ of right breast; Visit for screening mammogram; Ductal carcinoma  in situ of right breast     FINDINGS: No architectural distortion, dominant masses or suspicious  microcalcifications are identified in the left breast.      There is mild architectural distortion at the site of the previous  lumpectomy in the right breast. No new mass or suspicious  calcifications are seen.     BREAST DENSITY: Scattered fibroglandular densities.         IMPRESSION: BI-RADS CATEGORY: 2 - Benign.     RECOMMENDED FOLLOW-UP: Annual Mammography.        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 Provider to review     Unspecified asthma(493.90)      Unspecified immunity deficiency      Vitamin D deficiency 1/26/2016      Past Surgical History:   Procedure Laterality Date     BIOPSY      right breast--neg     BIOPSY BREAST NEEDLE LOCALIZATION  2/13/2014  "   Procedure: BIOPSY BREAST NEEDLE LOCALIZATION;  WIRE LOCALIZED SEGMENTAL RESECTION, RIGHT BREAST;  Surgeon: Shanika Beaver MD;  Location: [facility]     CHOLECYSTECTOMY  2010     COLONOSCOPY  2009     COLONOSCOPY  11/12/2013     hysterectomy with D and C  03/2017    [facility]     ORTHOPEDIC SURGERY      heel surgery     ORTHOPEDIC SURGERY      rt knee meniiscus tear     Obstetric History     No data available          ROS:  C: NEGATIVE for fever, chills, change in weight  I: NEGATIVE for worrisome rashes, moles or lesions  E: NEGATIVE for vision changes or irritation  ENT: NEGATIVE for ear, mouth and throat problems  RESP:as above  B: NEGATIVE for masses, tenderness or discharge  CV: NEGATIVE for chest pain, palpitations or peripheral edema  GI: NEGATIVE for nausea, abdominal pain, heartburn, or change in bowel habits   menopausal female: vaginal dryness  M: NEGATIVE for significant arthralgias or myalgia  N: NEGATIVE for weakness, dizziness or paresthesias  P: NEGATIVE for changes in mood or affect     OBJECTIVE:   /80 (BP Location: Left arm, Patient Position: Sitting, Cuff Size: Adult Large)  Pulse 80  Temp 98  F (36.7  C)  Resp 14  Ht 5' 6\" (1.676 m)  Wt 295 lb (133.8 kg)  BMI 47.61 kg/m2       EXAM:  GENERAL: healthy, alert and no distress  EYES: Eyes grossly normal to inspection, PERRL and conjunctivae and sclerae normal  HENT: ear canals and TM's normal, nose and mouth without ulcers or lesions  NECK: no adenopathy, no asymmetry, masses, or scars and thyroid normal to palpation  RESP: lungs clear to auscultation - no rales, rhonchi or wheezes  CV: regular rate and rhythm, normal S1 S2, no S3 or S4, no murmur, click or rub, no peripheral edema and peripheral pulses strong  MS: no gross musculoskeletal defects noted, no edema  SKIN: no suspicious lesions or rashes  PSYCH: mentation appears normal, affect normal/bright  LYMPH: no cervical, supraclavicular denopathy    Results for orders placed or " performed in visit on 02/14/18   CBC with platelets differential   Result Value Ref Range    WBC 7.7 4.0 - 11.0 10e9/L    RBC Count 4.33 3.8 - 5.2 10e12/L    Hemoglobin 12.9 11.7 - 15.7 g/dL    Hematocrit 39.2 35.0 - 47.0 %    MCV 91 78 - 100 fl    MCH 29.8 26.5 - 33.0 pg    MCHC 32.9 31.5 - 36.5 g/dL    RDW 13.2 10.0 - 15.0 %    Platelet Count 292 150 - 450 10e9/L    Diff Method Automated Method     % Neutrophils 51.7 %    % Lymphocytes 37.3 %    % Monocytes 9.6 %    % Eosinophils 1.0 %    % Basophils 0.4 %    Absolute Neutrophil 4.0 1.6 - 8.3 10e9/L    Absolute Lymphocytes 2.9 0.8 - 5.3 10e9/L    Absolute Monocytes 0.7 0.0 - 1.3 10e9/L    Absolute Eosinophils 0.1 0.0 - 0.7 10e9/L    Absolute Basophils 0.0 0.0 - 0.2 10e9/L   Comprehensive metabolic panel   Result Value Ref Range    Sodium 137 133 - 144 mmol/L    Potassium 3.9 3.4 - 5.3 mmol/L    Chloride 104 94 - 109 mmol/L    Carbon Dioxide 25 20 - 32 mmol/L    Anion Gap 8 3 - 14 mmol/L    Glucose 93 70 - 99 mg/dL    Urea Nitrogen 27 7 - 30 mg/dL    Creatinine 0.81 0.52 - 1.04 mg/dL    GFR Estimate 72 >60 mL/min/1.7m2    GFR Estimate If Black 87 >60 mL/min/1.7m2    Calcium 9.0 8.5 - 10.1 mg/dL    Bilirubin Total 0.3 0.2 - 1.3 mg/dL    Albumin 4.4 3.4 - 5.0 g/dL    Protein Total 8.3 6.8 - 8.8 g/dL    Alkaline Phosphatase 39 (L) 40 - 150 U/L    ALT 31 0 - 50 U/L    AST 15 0 - 45 U/L   Lactate Dehydrogenase   Result Value Ref Range    Lactate Dehydrogenase 209 81 - 234 U/L   Ca27.29  breast tumor marker   Result Value Ref Range    CA 27-29 24 0 - 39 U/mL       ASSESSMENT/PLAN:   1. Routine general medical examination at a health care facility  - Annual physical 1 year    2. Benign essential hypertension - Goal is 120's/70  - lisinopril (PRINIVIL/ZESTRIL) 10 MG tablet; Take 1 Tab by mouth one time a day.  Dispense: 90 tablet; Refill: 1  - TSH with free T4 reflex  - Lipid Profile  - UA reflex to Microscopic and Culture - [facility]  - BP recheck in 2 week or  "so    3. Vitamin D deficiency  - Vitamin D level  - D3 OTC 5000 U daily    4. Weight gain  - BARIATRIC ADULT REFERRAL       Cough seems viral, exam findings are normal - supportive care      See Oncology tomorrow  FU here 6 months, sooner as needed          COUNSELING:   Reviewed preventive health counseling, as reflected in patient instructions       Regular exercise       Healthy diet/nutrition       Consider Hep C screening for patients born between 1945 and 1965           reports that she quit smoking about 4 years ago. Her smoking use included Cigarettes. She has a 3.00 pack-year smoking history. She has never used smokeless tobacco.    Estimated body mass index is 47.61 kg/(m^2) as calculated from the following:    Height as of this encounter: 5' 6\" (1.676 m).    Weight as of this encounter: 295 lb (133.8 kg).         Counseling Resources:  ATP IV Guidelines  Pooled Cohorts Equation Calculator  Breast Cancer Risk Calculator  FRAX Risk Assessment  ICSI Preventive Guidelines  Dietary Guidelines for Americans, 2010  USDA's MyPlate  ASA Prophylaxis  Lung CA Screening    Jackie Hitchcock NP  [facility]  "

## 2018-02-20 NOTE — LETTER
[facility]  [address]  [place] [address]  Phone: [phone]    February 20, 2018        Vandana Jaeger  [address] [place] [address]          Addy Coelho    RE: Vandana Jaeger    Referring this rey gal to you to discuss weight management.  I will attach my most recent physical and labs.    Thank you for your help!    Please contact me for questions or concerns.      Sincerely,        Jackie Hitchcock NP

## 2018-02-20 NOTE — MR AVS SNAPSHOT
After Visit Summary   2/20/2018    Vandana Jaeger    MRN: [id number]           Patient Information     Date Of Birth          1960        Visit Information        Provider Department      2/20/2018 10:00 AM Jackie Hitchcock NP [facility] [place]        Today's Diagnoses     Routine general medical examination at a health care facility    -  1    Benign essential hypertension        Vitamin D deficiency        Morbid obesity (H)          Care Instructions      ASSESSMENT/PLAN:   1. Routine general medical examination at a health care facility  - Annual physical 1 year    2. Benign essential hypertension - Goal is 120's/70  - lisinopril (PRINIVIL/ZESTRIL) 10 MG tablet; Take 1 Tab by mouth one time a day.  Dispense: 90 tablet; Refill: 1  - TSH with free T4 reflex  - Lipid Profile  - UA reflex to Microscopic and Culture - [facility]  - BP recheck in 2 week or so    3. Vitamin D deficiency  - Vitamin D level  - D3 OTC 5000 U daily    4. Weight gain  - BARIATRIC ADULT REFERRAL     Cough seems viral, exam findings are normal    See Oncology tomorrow  FU here 6 months, sooner as needed        Preventive Health Recommendations  Female Ages 50 - 64    Yearly exam: See your health care provider every year in order to  o Review health changes.   o Discuss preventive care.    o Review your medicines if your doctor has prescribed any.      Get a Pap test every three years (unless you have an abnormal result and your provider advises testing more often).    If you get Pap tests with HPV test, you only need to test every 5 years, unless you have an abnormal result.     You do not need a Pap test if your uterus was removed (hysterectomy) and you have not had cancer.    You should be tested each year for STDs (sexually transmitted diseases) if you're at risk.     Have a mammogram every 1 to 2 years.    Have a colonoscopy at age 50, or have a yearly FIT test (stool test). These exams screen for colon cancer.       Have a cholesterol test every 5 years, or more often if advised.    Have a diabetes test (fasting glucose) every three years. If you are at risk for diabetes, you should have this test more often.     If you are at risk for osteoporosis (brittle bone disease), think about having a bone density scan (DEXA).    Shots: Get a flu shot each year. Get a tetanus shot every 10 years.    Nutrition:     Eat at least 5 servings of fruits and vegetables each day.    Eat whole-grain bread, whole-wheat pasta and brown rice instead of white grains and rice.    Talk to your provider about Calcium and Vitamin D.     Lifestyle    Exercise at least 150 minutes a week (30 minutes a day, 5 days a week). This will help you control your weight and prevent disease.    Limit alcohol to one drink per day.    No smoking.     Wear sunscreen to prevent skin cancer.     See your dentist every six months for an exam and cleaning.    See your eye doctor every 1 to 2 years.            Follow-ups after your visit        Additional Services     BARIATRIC ADULT REFERRAL       Your provider has referred you to: Dr. Coelho, [facility] - letter done    Please be aware that coverage of these services is subject to the terms and limitations of your health insurance plan.  Call member services at your health plan with any benefit or coverage questions.      Please bring the following with you to your appointment:      (1) List of current medications   (2) This referral request   (3) Any documents/labs given to you for this referral                  Your next 10 appointments already scheduled     Feb 21, 2018  2:00 PM CST   (Arrive by 1:45 PM)   Return Visit with Matilde Patrick NP   [facility] ([facility] )    [address] [place] [address]   [phone]              Who to contact     If you have questions or need follow up information about today's clinic visit or your schedule please contact  "[facility] directly at [phone].  Normal or non-critical lab and imaging results will be communicated to you by MyChart, letter or phone within 4 business days after the clinic has received the results. If you do not hear from us within 7 days, please contact the clinic through [employer] or phone. If you have a critical or abnormal lab result, we will notify you by phone as soon as possible.  Submit refill requests through [employer] or call your pharmacy and they will forward the refill request to us. Please allow 3 business days for your refill to be completed.          Additional Information About Your Visit        [employer] Information     [employer] lets you send messages to your doctor, view your test results, renew your prescriptions, schedule appointments and more. To sign up, go to [email or web address] . Click on \"Log in\" on the left side of the screen, which will take you to the Welcome page. Then click on \"Sign up Now\" on the right side of the page.     You will be asked to enter the access code listed below, as well as some personal information. Please follow the directions to create your username and password.     Your access code is: [email or web address]  Expires: 2018 10:49 AM     Your access code will  in 90 days. If you need help or a new code, please call your [place] clinic or [phone].        Care EveryWhere ID     This is your Care EveryWhere ID. This could be used by other organizations to access your [place] medical records  [id number]        Your Vitals Were     Pulse Temperature Respirations Height BMI (Body Mass Index)       80 98  F (36.7  C) 14 5' 6\" (1.676 m) 47.61 kg/m2        Blood Pressure from Last 3 Encounters:   18 140/80   17 148/82   17 (!) 132/92    Weight from Last 3 Encounters:   18 295 lb (133.8 kg)   17 297 lb (134.7 kg)   17 296 lb (134.3 kg)              We Performed the Following     *UA reflex to Microscopic and " Culture - [facility]     BARIATRIC ADULT REFERRAL     Lipid Profile     TSH with free T4 reflex     Vitamin D Deficiency          Today's Medication Changes          These changes are accurate as of 2/20/18 10:49 AM.  If you have any questions, ask your nurse or doctor.               These medicines have changed or have updated prescriptions.        Dose/Directions    lisinopril 10 MG tablet   Commonly known as:  PRINIVIL/ZESTRIL   This may have changed:  See the new instructions.   Used for:  Benign essential hypertension   Changed by:  Jackie Hitchcock NP        Take 1 Tab by mouth one time a day.   Quantity:  90 tablet   Refills:  1            Where to get your medicines      These medications were sent to [facility] - [address] AT [address] [address]     Phone:  [phone]     lisinopril 10 MG tablet                Primary Care Provider Office Phone # Fax #    Rvika FERREIRA [facility] [place] [phone] [address] [facility] [address]        Equal Access to Services     [facility] : Hadii loly ku hadasho Soomaali, waaxda luqadaha, qaybta kaalmada adeegyada, waxay idiin haycathleen miller . So [facility] [phone].    ATENCIÓN: Si habla español, tiene a whiting disposición servicios gratuitos de asistencia lingüística. Gustaboame al [phone].    We comply with applicable federal civil rights laws and [place] laws. We do not discriminate on the basis of race, color, national origin, age, disability, sex, sexual orientation, or gender identity.            Thank you!     Thank you for choosing [facility]  for your care. Our goal is always to provide you with excellent care. Hearing back from our patients is one way we can continue to improve our services. Please take a few minutes to complete the written survey that you may receive in the mail after your visit with us. Thank  you!             Your Updated Medication List - Protect others around you: Learn how to safely use, store and throw away your medicines at www.disposemymeds.org.          This list is accurate as of 2/20/18 10:49 AM.  Always use your most recent med list.                   Brand Name Dispense Instructions for use Diagnosis    * albuterol (2.5 MG/3ML) 0.083% neb solution     1 Box    Take 1 vial (2.5 mg) by nebulization every 4 hours as needed for shortness of breath / dyspnea or wheezing    Mild persistent asthma without complication       * VENTOLIN  (90 BASE) MCG/ACT Inhaler   Generic drug:  albuterol     18 g    Inhale 2 puffs into the lungs every 4 hours as needed for shortness of breath / dyspnea or wheezing    Mild persistent asthma without complication       ASPIRIN PO      Take 81 mg by mouth daily        calcium-vitamin D 600-400 MG-UNIT per tablet    CALTRATE     Take 1 tablet by mouth 2 times daily        [employer] vitamin  B12 1000 MCG Tabs   Generic drug:  cyanocobalamin     30 tablet    Take 1,000 mcg by mouth daily        DAILY MULTIVITAMIN PO      Take one tablet by oral route every day with food        escitalopram 10 MG tablet    LEXAPRO    90 tablet    Take 1 Tab by mouth one time a day.    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    Benign essential hypertension       nystatin 248077 UNIT/GM Powd    MYCOSTATIN    60 g    Apply 100,000 g topically 2 times daily as needed    Candidiasis of skin and nails       OMEGA 3 PO      Take 1,000 mg by mouth daily        tamoxifen 20 MG tablet    NOLVADEX    90 tablet    Take 1 Tab by mouth one time a day.    Ductal carcinoma in situ of right breast       TYLENOL PO      Take 325 mg by mouth every 4 hours as needed for mild pain or fever         VITAMIN D (CHOLECALCIFEROL) PO      Take 5,000 Units by mouth daily        * Notice:  This list has 2 medication(s) that are the same as other medications prescribed for you. Read the directions carefully, and ask your doctor or other care provider to review them with you.

## 2018-02-20 NOTE — PATIENT INSTRUCTIONS
ASSESSMENT/PLAN:   1. Routine general medical examination at a health care facility  - Annual physical 1 year    2. Benign essential hypertension - Goal is 120's/70  - lisinopril (PRINIVIL/ZESTRIL) 10 MG tablet; Take 1 Tab by mouth one time a day.  Dispense: 90 tablet; Refill: 1  - TSH with free T4 reflex  - Lipid Profile  - UA reflex to Microscopic and Culture - [facility]  - BP recheck in 2 week or so    3. Vitamin D deficiency  - Vitamin D level  - D3 OTC 5000 U daily    4. Weight gain  - BARIATRIC ADULT REFERRAL     Cough seems viral, exam findings are normal    See Oncology tomorrow  FU here 6 months, sooner as needed        Preventive Health Recommendations  Female Ages 50 - 64    Yearly exam: See your health care provider every year in order to  o Review health changes.   o Discuss preventive care.    o Review your medicines if your doctor has prescribed any.      Get a Pap test every three years (unless you have an abnormal result and your provider advises testing more often).    If you get Pap tests with HPV test, you only need to test every 5 years, unless you have an abnormal result.     You do not need a Pap test if your uterus was removed (hysterectomy) and you have not had cancer.    You should be tested each year for STDs (sexually transmitted diseases) if you're at risk.     Have a mammogram every 1 to 2 years.    Have a colonoscopy at age 50, or have a yearly FIT test (stool test). These exams screen for colon cancer.      Have a cholesterol test every 5 years, or more often if advised.    Have a diabetes test (fasting glucose) every three years. If you are at risk for diabetes, you should have this test more often.     If you are at risk for osteoporosis (brittle bone disease), think about having a bone density scan (DEXA).    Shots: Get a flu shot each year. Get a tetanus shot every 10 years.    Nutrition:     Eat at least 5 servings of fruits and vegetables each day.    Eat whole-grain  bread, whole-wheat pasta and brown rice instead of white grains and rice.    Talk to your provider about Calcium and Vitamin D.     Lifestyle    Exercise at least 150 minutes a week (30 minutes a day, 5 days a week). This will help you control your weight and prevent disease.    Limit alcohol to one drink per day.    No smoking.     Wear sunscreen to prevent skin cancer.     See your dentist every six months for an exam and cleaning.    See your eye doctor every 1 to 2 years.

## 2018-02-20 NOTE — Clinical Note
Here today for a physical - I did not do a breast exam as she said you will be doing that at tomorrows appt  Vaginal dryness - can you give her that list you gave me?  giovana ESPITIA [phone]

## 2018-02-20 NOTE — NURSING NOTE
"Chief Complaint   Patient presents with     Physical     Medication Request     Vaginal Problem     Cough       Initial /80 (BP Location: Left arm, Patient Position: Sitting, Cuff Size: Adult Large)  Pulse 80  Temp 98  F (36.7  C)  Resp 14  Ht 5' 6\" (1.676 m)  Wt 295 lb (133.8 kg)  BMI 47.61 kg/m2 Estimated body mass index is 47.61 kg/(m^2) as calculated from the following:    Height as of this encounter: 5' 6\" (1.676 m).    Weight as of this encounter: 295 lb (133.8 kg).  Medication Reconciliation: complete     Lidia Rodas      "

## 2018-02-20 NOTE — PROGRESS NOTES
Normal, please notify patient  UC and D are pending    Jackie Hitchcock [facility]  [phone]

## 2018-02-21 ENCOUNTER — ONCOLOGY VISIT (OUTPATIENT)
Dept: ONCOLOGY | Facility: OTHER | Age: 58
End: 2018-02-21
Attending: NURSE PRACTITIONER
Payer: COMMERCIAL

## 2018-02-21 VITALS
BODY MASS INDEX: 47.09 KG/M2 | DIASTOLIC BLOOD PRESSURE: 73 MMHG | HEART RATE: 87 BPM | SYSTOLIC BLOOD PRESSURE: 145 MMHG | RESPIRATION RATE: 20 BRPM | HEIGHT: 66 IN | OXYGEN SATURATION: 100 % | TEMPERATURE: 97.6 F | WEIGHT: 293 LBS

## 2018-02-21 DIAGNOSIS — Z78.0 POSTMENOPAUSAL STATUS: ICD-10-CM

## 2018-02-21 DIAGNOSIS — Z79.810 USE OF TAMOXIFEN (NOLVADEX): ICD-10-CM

## 2018-02-21 DIAGNOSIS — F43.23 ADJUSTMENT DISORDER WITH MIXED ANXIETY AND DEPRESSED MOOD: ICD-10-CM

## 2018-02-21 DIAGNOSIS — D05.11 DUCTAL CARCINOMA IN SITU OF RIGHT BREAST: Primary | ICD-10-CM

## 2018-02-21 LAB — DEPRECATED CALCIDIOL+CALCIFEROL SERPL-MC: 50 UG/L (ref 20–75)

## 2018-02-21 PROCEDURE — 99214 OFFICE O/P EST MOD 30 MIN: CPT | Performed by: NURSE PRACTITIONER

## 2018-02-21 ASSESSMENT — ANXIETY QUESTIONNAIRES: GAD7 TOTAL SCORE: 2

## 2018-02-21 ASSESSMENT — PAIN SCALES - GENERAL: PAINLEVEL: NO PAIN (0)

## 2018-02-21 ASSESSMENT — PATIENT HEALTH QUESTIONNAIRE - PHQ9: SUM OF ALL RESPONSES TO PHQ QUESTIONS 1-9: 1

## 2018-02-21 NOTE — NURSING NOTE
"Chief Complaint   Patient presents with     RECHECK     Follow up ductal carcinoma in siti of right breast       Initial /73  Pulse 87  Temp 97.6  F (36.4  C) (Tympanic)  Resp 20  Ht 1.676 m (5' 6\")  Wt 134.3 kg (296 lb)  SpO2 100%  BMI 47.78 kg/m2 Estimated body mass index is 47.78 kg/(m^2) as calculated from the following:    Height as of this encounter: 1.676 m (5' 6\").    Weight as of this encounter: 134.3 kg (296 lb).  Medication Reconciliation: complete   Immunizations reviewed and up to date, Information on advanced directives given to patient, pain = 0, PHQ9 = 2.  Patient was assessed using the NCCN psychosocial distress thermometer. Patient rated the score as a 0. Patient rated current stressors as none. Stressors will be brought to the attention of provider or Oncology RN Care Coordinator for a score of 6 or greater or per nurses discretion.   Update A [facility] nurse on BP reading.  Darling Bain LPN                "

## 2018-02-21 NOTE — MR AVS SNAPSHOT
After Visit Summary   2/21/2018    Vandana Jaeger    MRN: [id number]           Patient Information     Date Of Birth          1960        Visit Information        Provider Department      2/21/2018 2:00 PM Pettinelli, Matilde D, NP [facility] [place]        Today's Diagnoses     Ductal carcinoma in situ of right breast    -  1    Use of tamoxifen (Nolvadex)        Postmenopausal status          Care Instructions    We would like to see you back in 6 months. Please come 1 week prior for lab work at [facility] and DEXa scan that can be done at [facility] in [place]. When you are in need of a refill, please call your pharmacy and they will send us a request. If you have any questions please call [phone]  Other instructions:  Make appointment for yearly GYN exam          Follow-ups after your visit        Your next 10 appointments already scheduled     Aug 08, 2018  9:30 AM CDT   LAB with [facility]   [facility] ([facility] )    [address]  [facility] [address]   [phone]            Aug 15, 2018 11:00 AM CDT   (Arrive by 10:45 AM)   Return Visit with Matilde D Pettinelli, NP   [facility] [place] ([facility] [place] )    [address]  [place] [address]   [phone]              Future tests that were ordered for you today     Open Future Orders        Priority Expected Expires Ordered    DX Hip/Pelvis/Spine Routine 8/21/2018 2/21/2019 2/21/2018    CBC with platelets differential Routine 8/20/2018 2/20/2019 2/21/2018    Comprehensive metabolic panel Routine 8/20/2018 2/20/2019 2/21/2018    Ca27.29  breast tumor marker Routine 8/20/2018 2/20/2019 2/21/2018            Who to contact     If you have questions or need follow up information about today's clinic visit or your schedule please contact [facility]ICK directly at [phone].  Normal or non-critical lab and imaging results will be  "communicated to you by [employer], letter or phone within 4 business days after the clinic has received the results. If you do not hear from us within 7 days, please contact the clinic through [employer] or phone. If you have a critical or abnormal lab result, we will notify you by phone as soon as possible.  Submit refill requests through [employer] or call your pharmacy and they will forward the refill request to us. Please allow 3 business days for your refill to be completed.          Additional Information About Your Visit        [employer] Information     [employer] lets you send messages to your doctor, view your test results, renew your prescriptions, schedule appointments and more. To sign up, go to [email or web address] . Click on \"Log in\" on the left side of the screen, which will take you to the Welcome page. Then click on \"Sign up Now\" on the right side of the page.     You will be asked to enter the access code listed below, as well as some personal information. Please follow the directions to create your username and password.     Your access code is: [email or web address]  Expires: 2018 10:49 AM     Your access code will  in 90 days. If you need help or a new code, please call your [place] clinic or [phone].        Care EveryWhere ID     This is your Care EveryWhere ID. This could be used by other organizations to access your [place] medical records  [id number]        Your Vitals Were     Pulse Temperature Respirations Height Pulse Oximetry BMI (Body Mass Index)    87 97.6  F (36.4  C) (Tympanic) 20 1.676 m (5' 6\") 100% 47.78 kg/m2       Blood Pressure from Last 3 Encounters:   18 145/73   18 140/80   17 148/82    Weight from Last 3 Encounters:   18 134.3 kg (296 lb)   18 133.8 kg (295 lb)   17 134.7 kg (297 lb)               Primary Care Provider Office Phone # Fax #    Jackie Hitchcock [phone] [address]  [place] [facility] [address]   "      Equal Access to Services     [facility] : Hadii aad ku hademilianobaylee Rejiali, waaxda luqadaha, qaybta kaalmajeff franco, lb peter. So [facility] [phone].    ATENCIÓN: Si habla español, tiene a whiting disposición servicios gratuitos de asistencia lingüística. Gustaboame al [phone].    We comply with applicable federal civil rights laws and [place] laws. We do not discriminate on the basis of race, color, national origin, age, disability, sex, sexual orientation, or gender identity.            Thank you!     Thank you for choosing [facility]  [facility] your care. Our goal is always to provide you with excellent care. Hearing back from our patients is one way we can continue to improve our services. Please take a few minutes to complete the written survey that you may receive in the mail after your visit with us. Thank you!             Your Updated Medication List - Protect others around you: Learn how to safely use, store and throw away your medicines at www.disposemymeds.org.          This list is accurate as of 2/21/18  2:59 PM.  Always use your most recent med list.                   Brand Name Dispense Instructions for use Diagnosis    * albuterol (2.5 MG/3ML) 0.083% neb solution     1 Box    Take 1 vial (2.5 mg) by nebulization every 4 hours as needed for shortness of breath / dyspnea or wheezing    Mild persistent asthma without complication       * VENTOLIN  (90 BASE) MCG/ACT Inhaler   Generic drug:  albuterol     18 g    Inhale 2 puffs into the lungs every 4 hours as needed for shortness of breath / dyspnea or wheezing    Mild persistent asthma without complication       ASPIRIN PO      Take 81 mg by mouth daily        calcium-vitamin D 600-400 MG-UNIT per tablet    CALTRATE     Take 1 tablet by mouth 2 times daily        [employer]S vitamin  B12 1000 MCG Tabs   Generic drug:  cyanocobalamin     30 tablet    Take 1,000 mcg by mouth daily        DAILY MULTIVITAMIN PO       Take one tablet by oral route every day with food        escitalopram 10 MG tablet    LEXAPRO    90 tablet    Take 1 Tab by mouth one time a day.    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    Benign essential hypertension       nystatin 902738 UNIT/GM Powd    MYCOSTATIN    60 g    Apply 100,000 g topically 2 times daily as needed    Candidiasis of skin and nails       OMEGA 3 PO      Take 1,000 mg by mouth daily        tamoxifen 20 MG tablet    NOLVADEX    90 tablet    Take 1 Tab by mouth one time a day.    Ductal carcinoma in situ of right breast       TYLENOL PO      Take 325 mg by mouth every 4 hours as needed for mild pain or fever        VITAMIN D (CHOLECALCIFEROL) PO      Take 5,000 Units by mouth daily        * Notice:  This list has 2 medication(s) that are the same as other medications prescribed for you. Read the directions carefully, and ask your doctor or other care provider to review them with you.

## 2018-02-21 NOTE — PATIENT INSTRUCTIONS
We would like to see you back in 6 months. Please come 1 week prior for lab work at [facility] and DEXa scan that can be done at [facility] in [place]. When you are in need of a refill, please call your pharmacy and they will send us a request. If you have any questions please call [phone]  Other instructions:  Make appointment for yearly GYN exam

## 2018-02-21 NOTE — PROGRESS NOTES
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  Patient was diagnosed with DCIS and LCIS in January 2014 and had a lumpectomy performed in February 2014. She was placed on tamoxifen 20 mg daily in March of 2014.      She presents today stating she is doing well.  She saw her PCP, Jackie Hitchcock yesterday and states this visit went well.  She does complain of vaginal dryness that she feels has gotten worse.  She does have some upper respiratory symptoms including a cough and denies any fevers.  She has no new areas of bone pain and recently had a cortisone injection in her right knee which she states did help.  She denies any vaginal discharge or vaginal bleeding and her last GYN exam was completed in March of 2017 with Dr. Alvarado.  She is on calcium with vitamin D once daily.  It she is doing well from and anxiety and depression standpoint other than that she recently lost another coworker and this is very disheartening.    Oncologic History: Vandana had undergone an abnormal mammogram in November 2013 which showed microcalcifications suspicious for malignancy. She subsequently underwent stereotactic biopsy on November 21, 2013 with original interpretation of histopathology being that of hyperplasia without atypia. This was then over read as part of routine protocol in January 2014. 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 She was evaluated by Dr. Shanika Beaver in Gen. Surgery on January 28, 2014 and bilateral MRI of the breast was obtained which revealed oval-shaped nodular densities in both breasts consistent with inframammary lymph nodes.       The patient was seen by Dr. Chávez of radiation oncology on  1/31/2014 and his impression was that the patient had DCIS and would require definitive surgery.      The patient underwent excisional breast biopsy on 2/13/2014 and pathology showed LCIS, multifocal, completely excised and low-grade, grade 1, DCIS which was completely excised. Tumor size was at least 0.5 cm DCIS grade 1; margins were uninvolved by DCIS. Estrogen receptor was positive at 90% and progesterone receptor was positive at 50%. Patient was staged pathologic Tis associated with LCIS. She was started on tamoxifen.      Current Chemo Regime/TX: Tamoxifen 20 mg daily started March 2014  Current Cycle: n/a  # of completed cycles: n/a      Previous treatment:  n/a    Past Medical History:   Diagnosis Date     Anxiety state, unspecified      Benign essential hypertension 11/24/2015     Contact dermatitis and other eczema, due to unspecified cause      Ductal carcinoma in situ of right breast 1/28/2014     Ductal carcinoma in situ of right breast 1/28/2014     H/O herpes zoster      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 1/28/2014     Morbid obesity (H) 1/28/2014     Other B-complex deficiencies 8/19/2014     Other specified disease of hair and hair follicles      Primary central sleep apnea 1/26/2016     Reactive airway disease that is not asthma      Problem list name updated by automated process. Provider to review     Unspecified asthma(493.90)      Unspecified immunity deficiency      Vitamin D deficiency 1/26/2016       Past Surgical History:   Procedure Laterality Date     BIOPSY      right breast--neg     BIOPSY BREAST NEEDLE LOCALIZATION  2/13/2014    Procedure: BIOPSY BREAST NEEDLE LOCALIZATION;  WIRE LOCALIZED SEGMENTAL RESECTION, RIGHT BREAST;  Surgeon: Shanika Beaver MD;  Location: [facility]     CHOLECYSTECTOMY  2010     COLONOSCOPY  2009     COLONOSCOPY  11/12/2013     hysterectomy with D and C  03/2017    [facility]     ORTHOPEDIC SURGERY      heel surgery     ORTHOPEDIC SURGERY      rt knee  meniiscus tear       Family History   Problem Relation Age of Onset     C.A.D. Father      Coronary Artery Disease Father      CEREBROVASCULAR DISEASE Father      Prostate Cancer Father      Hypertension Mother      CEREBROVASCULAR DISEASE Mother      Breast Cancer Mother      Thyroid Disease Other      Thyroid Disease Sister      DIABETES No family hx of      Asthma No family hx of      Hyperlipidemia No family hx of      Colon Cancer No family hx of      Other Cancer No family hx of      Anesthesia Reaction No family hx of      Genetic Disorder No family hx of        Social History     Social History     Marital status: Single     Spouse name: N/A     Number of children: N/A     Years of education: N/A     Occupational History     Not on file.     Social History Main Topics     Smoking status: Former Smoker     Packs/day: 0.10     Years: 30.00     Types: Cigarettes     Quit date: 3/3/2013     Smokeless tobacco: Never Used     Alcohol use Yes     Drug use: No     Sexual activity: Yes     Partners: Male     Other Topics Concern     Parent/Sibling W/ Cabg, Mi Or Angioplasty Before 65f 55m? No     Social History Narrative       Current Outpatient Prescriptions   Medication     Acetaminophen (TYLENOL PO)     lisinopril (PRINIVIL/ZESTRIL) 10 MG tablet     tamoxifen (NOLVADEX) 20 MG tablet     VENTOLIN  (90 BASE) MCG/ACT Inhaler     escitalopram (LEXAPRO) 10 MG tablet     cyanocobalamin ([employer]S VITAMIN  B12) 1000 MCG TABS     albuterol (2.5 MG/3ML) 0.083% nebulizer solution     calcium-vitamin D (CALTRATE) 600-400 MG-UNIT per tablet     nystatin (MYCOSTATIN) 656460 UNIT/GM POWD     Omega-3 Fatty Acids (OMEGA 3 PO)     VITAMIN D, CHOLECALCIFEROL, PO     Multiple Vitamin (DAILY MULTIVITAMIN PO)     ASPIRIN PO     No current facility-administered medications for this visit.         Allergies   Allergen Reactions     Adhesive Tape      Steri strips left scar.     Latex      Possible allergy.       Review Of  "Systems:  Constitutional: denies fever, weight changes, chills, and night sweats.  Eyes: denies blurred or double vision  Ears/Nose/Throat: denies ear pain, nose problems, difficulty swallowing  Respiratory: denies shortness of breath, cough  Skin: denies rash, lesions  Breast/Chest wall: denies new pain, lumps or discharge  Cardiovascular: denies chest pain, palpitations, edema  Gastrointestinal: denies abdominal pain, bloating, nausea, vomiting, early satiety  Genitourinary: denies difficulty with urination, blood in urine  Musculoskeletal:denies new muscle pain, bone pain  Neurologic: denies lightheadedness, headaches, numbness or tingling  Psychiatric: denies anxiety, depression-see hPI  Hematologic/Lymphatic/Immunologic: denies easy bruising, easy bleeding, lumps or bumps noted  Endocrine: Denies increased thirst; has dry mouth      Physical Exam:  /73  Pulse 87  Temp 97.6  F (36.4  C) (Tympanic)  Resp 20  Ht 1.676 m (5' 6\")  Wt 134.3 kg (296 lb)  SpO2 100%  BMI 47.78 kg/m2    GENERAL APPEARANCE: Healthy, alert and in no acute distress.  HEENT: Normocephalic, Sclerae anicteric. Oropharynx without ulcers, lesions, or thrush.  NECK: Supple. No asymmetry or masses, no thyromegaly.  LYMPHATICS: No palpable cervical, supraclavicular, axillary, or inguinal nodes   RESP: Lungs clear to auscultation bilaterally, respirations regular and easy  CARDIOVASCULAR: Regular rate and rhythm. Normal S1, S2; no murmur, gallop, or rub.  ABDOMEN: Soft, nontender. Bowel sounds auscultated all 4 quadrants. No palpable organomegaly or masses.  BREAST/Chest wall: no concerning lumps, masses or tenderness bilaterally  MUSCULOSKELETAL: Extremities without gross deformities noted. No edema of bilateral lower extremities.  NEURO: Alert and oriented x 3.  Gait steady.  PSYCHIATRIC: Mentation and affect appear normal.  Mood appropriate.    Laboratory:  Component Value Flag Ref Range Units Status Collected Lab   WBC 7.7  4.0 - " 11.0 10e9/L Final 02/14/2018  4:38 PM [facility]   RBC Count 4.33  3.8 - 5.2 10e12/L Final 02/14/2018  4:38 PM [facility]   Hemoglobin 12.9  11.7 - 15.7 g/dL Final 02/14/2018  4:38 PM [facility]   Hematocrit 39.2  35.0 - 47.0 % Final 02/14/2018  4:38 PM [facility]   MCV 91  78 - 100 fl Final 02/14/2018  4:38 PM [facility]   MCH 29.8  26.5 - 33.0 pg Final 02/14/2018  4:38 PM [facility]   MCHC 32.9  31.5 - 36.5 g/dL Final 02/14/2018  4:38 PM [facility]   RDW 13.2  10.0 - 15.0 % Final 02/14/2018  4:38 PM [facility]   Platelet Count 292  150 - 450 10e9/L Final 02/14/2018  4:38 PM [facility]   Diff Method     Final 02/14/2018  4:38 PM MT   Automated Method   % Neutrophils 51.7   % Final 02/14/2018  4:38 PM [facility]   % Lymphocytes 37.3   % Final 02/14/2018  4:38 PM [facility]   % Monocytes 9.6   % Final 02/14/2018  4:38 PM [facility]   % Eosinophils 1.0   % Final 02/14/2018  4:38 PM [facility]   % Basophils 0.4   % Final 02/14/2018  4:38 PM [facility]   Absolute Neutrophil 4.0  1.6 - 8.3 10e9/L Final 02/14/2018  4:38 PM [facility]   Absolute Lymphocytes 2.9  0.8 - 5.3 10e9/L Final 02/14/2018  4:38 PM MT   Absolute Monocytes 0.7  0.0 - 1.3 10e9/L Final 02/14/2018  4:38 PM MT   Absolute Eosinophils 0.1  0.0 - 0.7 10e9/L Final 02/14/2018  4:38 PM MT   Absolute Basophils 0.0  0.0 - 0.2 10e9/L Final 02/14/2018  4:38 PM [facility]     Component Value Flag Ref Range Units Status Collected Lab   Sodium 137  133 - 144 mmol/L Final 02/14/2018  4:38 PM [facility]   Potassium 3.9  3.4 - 5.3 mmol/L Final 02/14/2018  4:38 PM [facility]   Chloride 104  94 - 109 mmol/L Final 02/14/2018  4:38 PM [facility]   Carbon Dioxide 25  20 - 32 mmol/L Final 02/14/2018  4:38 PM [facility]   Anion Gap 8  3 - 14 mmol/L Final 02/14/2018  4:38 PM [facility]   Glucose 93  70 - 99 mg/dL Final 02/14/2018  4:38 PM [facility]   Urea Nitrogen 27  7 - 30 mg/dL Final 02/14/2018  4:38 PM [facility]   Creatinine 0.81  0.52 - 1.04 mg/dL Final 02/14/2018  4:38 PM [facility]   GFR Estimate 72  >60 mL/min/1.7m2 Final 02/14/2018  4:38 PM [facility]   Comment:   Non  GFR Calc   GFR Estimate If Black 87  >60 mL/min/1.7m2 Final 02/14/2018  4:38 PM  [facility]   Comment:   African American GFR Calc   Calcium 9.0  8.5 - 10.1 mg/dL Final 02/14/2018  4:38 PM [facility]   Bilirubin Total 0.3  0.2 - 1.3 mg/dL Final 02/14/2018  4:38 PM [facility]   Albumin 4.4  3.4 - 5.0 g/dL Final 02/14/2018  4:38 PM [facility]   Protein Total 8.3  6.8 - 8.8 g/dL Final 02/14/2018  4:38 PM [facility]   Alkaline Phosphatase 39 (L) 40 - 150 U/L Final 02/14/2018  4:38 PM [facility]   ALT 31  0 - 50 U/L Final 02/14/2018  4:38 PM [facility]   AST 15  0 - 45 U/L Final 02/14/2018       Component Value Flag Ref Range Units Status Collected Lab   Lactate Dehydrogenase 209  81 - 234 U/L Final 02/14/2018  4:38 PM [facility]     Component Value Flag Ref Range Units Status Collected Lab   CA 27-29 24  0 - 39 U/mL Final 02/14/2018  4:38 PM          Imaging Studies:      EXAM: MA SCREENING BILATERAL W/ VIKA, 12/28/2017 10:33 AM     COMPARISONS: 12/28/2016, 12/16/2015 and 11/24/2014.     HISTORY: screening mammogram/h/o LCIS and DCIS; Lobular carcinoma in  situ of right breast; Visit for screening mammogram; Ductal carcinoma  in situ of right breast     FINDINGS: No architectural distortion, dominant masses or suspicious  microcalcifications are identified in the left breast.      There is mild architectural distortion at the site of the previous  lumpectomy in the right breast. No new mass or suspicious  calcifications are seen.     BREAST DENSITY: Scattered fibroglandular densities.         IMPRESSION: BI-RADS CATEGORY: 2 - Benign.     RECOMMENDED FOLLOW-UP: Annual Mammography.        RASHID RHODES MD      ASSESSMENT/PLAN:    #1 Breast cancer: Ductal carcinoma in situ of the right breast and lobular carcinoma in situ of the right breast diagnosed January 2014. Patient underwent lumpectomy. She was then placed on tamoxifen 20mg daily and patient continues on this to date. We will follow up in 6 months with a CBC, CMP and CA 27.29.  Patient will have her screening mammogram in December 2018.     #2 Use of Tamoxifen:  Annual exam is due and she will make an  appointment.  She is aware for need for yearly GYN exam.  Will obtain a DEXA scan prior to next follow up.    #3  Anxiety and depression:  She will continue on the lexapro.    I encouraged patient to call with any questions or concerns.    Approximately 25 minutes spent with the patient, with >75% of this time spent in counseling regarding her diagnoses and the psychosocial effects of this. Time was spent in discussion of length of time needed for Tamoxifen use and also in the plan for ongoing follow up.  Time was spent in discussion of slight chance of bone loss related to the Tamoxifen and her early menopausal status and recommendation for a DEXA scan.    Matilde GALVEZ, FNP-BC, AOCNP

## 2018-02-22 LAB
BACTERIA SPEC CULT: ABNORMAL
SPECIMEN SOURCE: ABNORMAL

## 2018-02-22 ASSESSMENT — PATIENT HEALTH QUESTIONNAIRE - PHQ9: SUM OF ALL RESPONSES TO PHQ QUESTIONS 1-9: 2

## 2018-02-25 PROBLEM — F43.23 ADJUSTMENT DISORDER WITH MIXED ANXIETY AND DEPRESSED MOOD: Status: ACTIVE | Noted: 2018-02-25

## 2018-03-05 ENCOUNTER — ALLIED HEALTH/NURSE VISIT (OUTPATIENT)
Dept: FAMILY MEDICINE | Facility: OTHER | Age: 58
End: 2018-03-05
Attending: NURSE PRACTITIONER
Payer: COMMERCIAL

## 2018-03-05 VITALS — DIASTOLIC BLOOD PRESSURE: 70 MMHG | HEART RATE: 64 BPM | SYSTOLIC BLOOD PRESSURE: 124 MMHG

## 2018-03-05 DIAGNOSIS — I10 BENIGN ESSENTIAL HYPERTENSION: Primary | ICD-10-CM

## 2018-03-05 PROCEDURE — 99207 ZZC NO CHARGE NURSE ONLY: CPT

## 2018-03-05 NOTE — MR AVS SNAPSHOT
After Visit Summary   3/5/2018    Vandana Jaeger    MRN: [id number]           Patient Information     Date Of Birth          1960        Visit Information        Provider Department      3/5/2018 4:00 PM [facility] FP NURSE [facility]        Today's Diagnoses     Benign essential hypertension    -  1       Follow-ups after your visit        Your next 10 appointments already scheduled     Mar 05, 2018  4:00 PM CST   (Arrive by 3:45 PM)   Nurse Only with [facility] NURSE   [facility] ([facility] )    [address]  [place] [address]   [phone]            Aug 08, 2018  9:30 AM CDT   LAB with [facility] LAB   [facility] ([facility] )    [address]  [place] [address]   [phone]            Aug 15, 2018 11:00 AM CDT   (Arrive by 10:45 AM)   Return Visit with Matilde Patrick NP   [facility] [place] [facility]    [address]  [place] [address]   [phone]              Who to contact     If you have questions or need follow up information about today's clinic visit or your schedule please contact [facility] directly at [phone].  Normal or non-critical lab and imaging results will be communicated to you by [employer], letter or phone within 4 business days after the clinic has received the results. If you do not hear from us within 7 days, please contact the clinic through [email or web address] or phone. If you have a critical or abnormal lab result, we will notify you by phone as soon as possible.  Submit refill requests through [employer] or call your pharmacy and they will forward the refill request to us. Please allow 3 business days for your refill to be completed.          Additional Information About Your Visit        [employer] Information     [employer] lets you send messages to your doctor, view your test results, renew your prescriptions, schedule  "appointments and more. To sign up, go to [email or web address] . Click on \"Log in\" on the left side of the screen, which will take you to the Welcome page. Then click on \"Sign up Now\" on the right side of the page.     You will be asked to enter the access code listed below, as well as some personal information. Please follow the directions to create your username and password.     Your access code is: [email or web address]  Expires: 2018 10:49 AM     Your access code will  in 90 days. If you need help or a new code, please call your [facility] clinic or [phone].        Care EveryWhere ID     This is your Care EveryWhere ID. This could be used by other organizations to access your [facility] medical records  [id number]        Your Vitals Were     Pulse                   64            Blood Pressure from Last 3 Encounters:   18 124/70   18 145/73   18 140/80    Weight from Last 3 Encounters:   18 296 lb (134.3 kg)   18 295 lb (133.8 kg)   17 297 lb (134.7 kg)              Today, you had the following     No orders found for display       Primary Care Provider Office Phone # Fax #    Jackie Hitchcock [phone] [address]  [place] [address]        Equal Access to Services     [facility] : Hadii loly love hadasho Soradhaali, waaxda luqadaha, qaybta kaalmada ademaureenda, lb miller . So [facility] [phone].    ATENCIÓN: Si habla español, tiene a whiting disposición servicios gratuitos de asistencia lingüística. Llame al [phone].    We comply with applicable federal civil rights laws and [place] laws. We do not discriminate on the basis of race, color, national origin, age, disability, sex, sexual orientation, or gender identity.            Thank you!     Thank you for choosing [facility]  for your care. Our goal is always to provide you with excellent care. Hearing back from our patients is one way we " can continue to improve our services. Please take a few minutes to complete the written survey that you may receive in the mail after your visit with us. Thank you!             Your Updated Medication List - Protect others around you: Learn how to safely use, store and throw away your medicines at www.disposemymeds.org.          This list is accurate as of 3/5/18  3:55 PM.  Always use your most recent med list.                   Brand Name Dispense Instructions for use Diagnosis    * albuterol (2.5 MG/3ML) 0.083% neb solution     1 Box    Take 1 vial (2.5 mg) by nebulization every 4 hours as needed for shortness of breath / dyspnea or wheezing    Mild persistent asthma without complication       * VENTOLIN  (90 BASE) MCG/ACT Inhaler   Generic drug:  albuterol     18 g    Inhale 2 puffs into the lungs every 4 hours as needed for shortness of breath / dyspnea or wheezing    Mild persistent asthma without complication       ASPIRIN PO      Take 81 mg by mouth daily        calcium-vitamin D 600-400 MG-UNIT per tablet    CALTRATE     Take 1 tablet by mouth 2 times daily        CVS vitamin  B12 1000 MCG Tabs   Generic drug:  cyanocobalamin     30 tablet    Take 1,000 mcg by mouth daily        DAILY MULTIVITAMIN PO      Take one tablet by oral route every day with food        escitalopram 10 MG tablet    LEXAPRO    90 tablet    Take 1 Tab by mouth one time a day.    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    Benign essential hypertension       nystatin 119474 UNIT/GM Powd    MYCOSTATIN    60 g    Apply 100,000 g topically 2 times daily as needed    Candidiasis of skin and nails       OMEGA 3 PO      Take 1,000 mg by mouth daily        tamoxifen 20 MG tablet    NOLVADEX    90 tablet    Take 1 Tab  by mouth one time a day.    Ductal carcinoma in situ of right breast       TYLENOL PO      Take 325 mg by mouth every 4 hours as needed for mild pain or fever        VITAMIN D (CHOLECALCIFEROL) PO      Take 5,000 Units by mouth daily        * Notice:  This list has 2 medication(s) that are the same as other medications prescribed for you. Read the directions carefully, and ask your doctor or other care provider to review them with you.

## 2018-03-21 ENCOUNTER — TELEPHONE (OUTPATIENT)
Dept: FAMILY MEDICINE | Facility: OTHER | Age: 58
End: 2018-03-21

## 2018-03-21 NOTE — TELEPHONE ENCOUNTER
Pt calls, has foot surgery pending, requested to have the office note of 2-2018 faxed to Vania at [facility], [phone]

## 2018-04-16 ENCOUNTER — OFFICE VISIT (OUTPATIENT)
Dept: FAMILY MEDICINE | Facility: OTHER | Age: 58
End: 2018-04-16
Attending: NURSE PRACTITIONER
Payer: COMMERCIAL

## 2018-04-16 ENCOUNTER — TELEPHONE (OUTPATIENT)
Dept: FAMILY MEDICINE | Facility: OTHER | Age: 58
End: 2018-04-16

## 2018-04-16 VITALS
HEART RATE: 80 BPM | SYSTOLIC BLOOD PRESSURE: 120 MMHG | BODY MASS INDEX: 47.09 KG/M2 | WEIGHT: 293 LBS | TEMPERATURE: 98.7 F | DIASTOLIC BLOOD PRESSURE: 58 MMHG | RESPIRATION RATE: 14 BRPM | HEIGHT: 66 IN

## 2018-04-16 DIAGNOSIS — Z01.818 PREOP GENERAL PHYSICAL EXAM: Primary | ICD-10-CM

## 2018-04-16 LAB
BASOPHILS # BLD AUTO: 0 10E9/L (ref 0–0.2)
BASOPHILS NFR BLD AUTO: 0.2 %
DIFFERENTIAL METHOD BLD: NORMAL
EOSINOPHIL # BLD AUTO: 0.1 10E9/L (ref 0–0.7)
EOSINOPHIL NFR BLD AUTO: 1.3 %
ERYTHROCYTE [DISTWIDTH] IN BLOOD BY AUTOMATED COUNT: 13.3 % (ref 10–15)
HCT VFR BLD AUTO: 38.2 % (ref 35–47)
HGB BLD-MCNC: 12.6 G/DL (ref 11.7–15.7)
LYMPHOCYTES # BLD AUTO: 2.9 10E9/L (ref 0.8–5.3)
LYMPHOCYTES NFR BLD AUTO: 33.4 %
MCH RBC QN AUTO: 30.1 PG (ref 26.5–33)
MCHC RBC AUTO-ENTMCNC: 33 G/DL (ref 31.5–36.5)
MCV RBC AUTO: 91 FL (ref 78–100)
MONOCYTES # BLD AUTO: 0.7 10E9/L (ref 0–1.3)
MONOCYTES NFR BLD AUTO: 8.7 %
NEUTROPHILS # BLD AUTO: 4.8 10E9/L (ref 1.6–8.3)
NEUTROPHILS NFR BLD AUTO: 56.4 %
PLATELET # BLD AUTO: 297 10E9/L (ref 150–450)
RBC # BLD AUTO: 4.18 10E12/L (ref 3.8–5.2)
WBC # BLD AUTO: 8.5 10E9/L (ref 4–11)

## 2018-04-16 PROCEDURE — 36415 COLL VENOUS BLD VENIPUNCTURE: CPT | Performed by: NURSE PRACTITIONER

## 2018-04-16 PROCEDURE — 93000 ELECTROCARDIOGRAM COMPLETE: CPT | Performed by: INTERNAL MEDICINE

## 2018-04-16 PROCEDURE — 85025 COMPLETE CBC W/AUTO DIFF WBC: CPT | Performed by: NURSE PRACTITIONER

## 2018-04-16 PROCEDURE — 99214 OFFICE O/P EST MOD 30 MIN: CPT | Mod: 25 | Performed by: NURSE PRACTITIONER

## 2018-04-16 ASSESSMENT — PAIN SCALES - GENERAL: PAINLEVEL: SEVERE PAIN (7)

## 2018-04-16 NOTE — PROGRESS NOTES
[facility]  [address]  [facility] [address]  [phone]  Dept: [phone]    PRE-OP EVALUATION:  Today's date: 2018    Vandana Jaeger (: 1960) presents for pre-operative evaluation assessment as requested by Dr. Vania Galvin.  She requires evaluation and anesthesia risk assessment prior to undergoing surgery/procedure for treatment of  Left foot pain, toes.    Proposed Surgery/ Procedure: Left foot arthrodesis/arthroplasty 4th & 5th toes  Date of Surgery/ Procedure: 2018  Time of Surgery/ Procedure:   Hospital/Surgical Facility: [facility]  Primary Physician: Jackie Hitchcock  Type of Anesthesia Anticipated: to be determined    Patient has a Health Care Directive or Living Will:  NO    1. NO - Do you have a history of heart attack, stroke, stent, bypass or surgery on an artery in the head, neck, heart or legs?  2. NO - Do you ever have any pain or discomfort in your chest?  3. NO - Do you have a history of  Heart Failure?  4. NO - Are you troubled by shortness of breath when: walking on the level, up a slight hill or at night?  5. NO - Do you currently have a cold, bronchitis or other respiratory infection?  6. NO - Do you have a cough, shortness of breath or wheezing?  7. NO - Do you sometimes get pains in the calves of your legs when you walk?  8. NO - Do you or anyone in your family have previous history of blood clots?  9. NO - Do you or does anyone in your family have a serious bleeding problem such as prolonged bleeding following surgeries or cuts?  10. NO - Have you ever had problems with anemia or been told to take iron pills?  11. NO - Have you had any abnormal blood loss such as black, tarry or bloody stools, or abnormal vaginal bleeding?  12. NO - Have you ever had a blood transfusion?  13. NO - Have you or any of your relatives ever had problems with anesthesia?  14. NO - Do you have sleep apnea, excessive snoring or daytime  drowsiness?  15. NO - Do you have any prosthetic heart valves?  16. NO - Do you have prosthetic joints?  17. NO - Is there any chance that you may be pregnant?      HPI:     HPI related to upcoming procedure: Left foot pain      See problem list for active medical problems.  Problems all longstanding and stable, except as noted/documented.  See ROS for pertinent symptoms related to these conditions.                                                                                                                                                          .    MEDICAL HISTORY:     Patient Active Problem List    Diagnosis Date Noted     Adjustment disorder with mixed anxiety and depressed mood 02/25/2018     Priority: Medium     Use of tamoxifen (Nolvadex) 02/20/2018     Priority: Medium     ACP (advance care planning) 08/09/2016     Priority: Medium     Advance Care Planning 8/9/2016: ACP Review of Chart / Resources Provided:  Reviewed chart for advance care plan.  Vandana JHON Jaeger has been provided information and resources to begin or update their advance care plan.  Added by Alisson Whiting             Vitamin D deficiency 01/26/2016     Priority: Medium     Benign essential hypertension 11/24/2015     Priority: Medium     Reactive airway disease that is not asthma      Priority: Medium     Contact dermatitis and other eczema, due to unspecified cause      Priority: Medium     Ductal carcinoma in situ of right breast 01/28/2014     Priority: Medium     Morbid obesity (H) 01/28/2014     Priority: Medium      Past Medical History:   Diagnosis Date     Anxiety state, unspecified      Benign essential hypertension 11/24/2015     Contact dermatitis and other eczema, due to unspecified cause      Ductal carcinoma in situ of right breast 1/28/2014     Ductal carcinoma in situ of right breast 1/28/2014     H/O herpes zoster      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 Provider to review     Unspecified asthma(493.90)      Unspecified immunity deficiency      Vitamin D deficiency 1/26/2016     Past Surgical History:   Procedure Laterality Date     BIOPSY      right breast--neg     BIOPSY BREAST NEEDLE LOCALIZATION  2/13/2014    Procedure: BIOPSY BREAST NEEDLE LOCALIZATION;  WIRE LOCALIZED SEGMENTAL RESECTION, RIGHT BREAST;  Surgeon: Shanika Beaver MD;  Location: [facility]     CHOLECYSTECTOMY  2010     COLONOSCOPY  2009     COLONOSCOPY  11/12/2013     hysterectomy with D and C  03/2017    [facility]     ORTHOPEDIC SURGERY      heel surgery     ORTHOPEDIC SURGERY      rt knee meniiscus tear     Current Outpatient Prescriptions   Medication Sig Dispense Refill     doxylamine (UNISOM) 25 MG TABS tablet Take 25 mg by mouth nightly as needed 1-2 at HS as needed       Acetaminophen (TYLENOL PO) Take 325 mg by mouth every 4 hours as needed for mild pain or fever       lisinopril (PRINIVIL/ZESTRIL) 10 MG tablet Take 1 Tab by mouth one time a day. 90 tablet 1     tamoxifen (NOLVADEX) 20 MG tablet Take 1 Tab by mouth one time a day. 90 tablet 1     VENTOLIN  (90 BASE) MCG/ACT Inhaler Inhale 2 puffs into the lungs every 4 hours as needed for shortness of breath / dyspnea or wheezing 18 g 0     escitalopram (LEXAPRO) 10 MG tablet Take 1 Tab by mouth one time a day. 90 tablet 1     ASPIRIN PO Take 81 mg by mouth daily       cyanocobalamin ([employer]S VITAMIN  B12) 1000 MCG TABS Take 1,000 mcg by mouth daily 30 tablet      albuterol (2.5 MG/3ML) 0.083% nebulizer solution Take 1 vial (2.5 mg) by nebulization every 4 hours as needed for shortness of breath / dyspnea or wheezing 1 Box 3     calcium-vitamin D (CALTRATE) 600-400 MG-UNIT per tablet Take 1 tablet by mouth 2  times daily       nystatin (MYCOSTATIN) 659356 UNIT/GM POWD Apply 100,000 g topically 2 times daily as needed 60 g 2     Omega-3 Fatty Acids (OMEGA 3 PO) Take 1,000 mg by mouth daily        VITAMIN D, CHOLECALCIFEROL, PO Take 5,000 Units by mouth daily        Multiple Vitamin (DAILY MULTIVITAMIN PO) Take one tablet by oral route every day with food       OTC products: None, except as noted above    Allergies   Allergen Reactions     Adhesive Tape      Steri strips left scar.     Latex      Possible allergy.      Latex Allergy: YES: Precautions to take: no use of latex    Social History   Substance Use Topics     Smoking status: Former Smoker     Packs/day: 0.10     Years: 30.00     Types: Cigarettes     Quit date: 3/3/2013     Smokeless tobacco: Never Used     Alcohol use Yes     History   Drug Use No       REVIEW OF SYSTEMS:   CONSTITUTIONAL: NEGATIVE for fever, chills, change in weight  INTEGUMENTARY/SKIN: NEGATIVE for worrisome rashes, moles or lesions  EYES: NEGATIVE for vision changes or irritation  ENT/MOUTH: NEGATIVE for ear, mouth and throat problems  RESP: NEGATIVE for significant cough or SOB  CV: NEGATIVE for chest pain, palpitations or peripheral edema  GI: NEGATIVE for nausea, abdominal pain, heartburn, or change in bowel habits  : NEGATIVE for frequency, dysuria, or hematuria  MUSCULOSKELETAL: foot pain  NEURO: NEGATIVE for weakness, dizziness or paresthesias  ENDOCRINE: NEGATIVE for temperature intolerance, skin/hair changes  HEME: NEGATIVE for bleeding problems  PSYCHIATRIC: NEGATIVE for changes in mood or affect    EXAM:   There were no vitals taken for this visit.    GENERAL APPEARANCE: healthy, alert and no distress     EYES: EOMI, PERRL     HENT: ear canals and TM's normal and nose and mouth without ulcers or lesions     NECK: no adenopathy, no asymmetry, masses, or scars and thyroid normal to palpation     RESP: lungs clear to auscultation - no rales, rhonchi or wheezes     CV: regular rates  and rhythm, normal S1 S2, no S3 or S4 and no murmur, click or rub     ABDOMEN:  soft, nontender, no HSM or masses and bowel sounds normal     SKIN: no suspicious lesions or rashes     NEURO: Normal strength and tone, sensory exam grossly normal, mentation intact and speech normal     PSYCH: mentation appears normal. and affect normal/bright     LYMPHATICS: No cervical adenopathy    DIAGNOSTICS:     EKG is attached - NSR    Results for orders placed or performed in visit on 04/16/18 (from the past 24 hour(s))   CBC with platelets differential   Result Value Ref Range    WBC 8.5 4.0 - 11.0 10e9/L    RBC Count 4.18 3.8 - 5.2 10e12/L    Hemoglobin 12.6 11.7 - 15.7 g/dL    Hematocrit 38.2 35.0 - 47.0 %    MCV 91 78 - 100 fl    MCH 30.1 26.5 - 33.0 pg    MCHC 33.0 31.5 - 36.5 g/dL    RDW 13.3 10.0 - 15.0 %    Platelet Count 297 150 - 450 10e9/L    Diff Method Automated Method     % Neutrophils 56.4 %    % Lymphocytes 33.4 %    % Monocytes 8.7 %    % Eosinophils 1.3 %    % Basophils 0.2 %    Absolute Neutrophil 4.8 1.6 - 8.3 10e9/L    Absolute Lymphocytes 2.9 0.8 - 5.3 10e9/L    Absolute Monocytes 0.7 0.0 - 1.3 10e9/L    Absolute Eosinophils 0.1 0.0 - 0.7 10e9/L    Absolute Basophils 0.0 0.0 - 0.2 10e9/L       Recent Labs   Lab Test  02/14/18   1638  08/01/17   1542   HGB  12.9  12.1   PLT  292  269   NA  137  140   POTASSIUM  3.9  4.1   CR  0.81  0.64        IMPRESSION:   Reason for surgery/procedure: Left fot pain    The proposed surgical procedure is considered LOW risk.    REVISED CARDIAC RISK INDEX  The patient has the following serious cardiovascular risks for perioperative complications such as (MI, PE, VFib and 3  AV Block):  No serious cardiac risks  INTERPRETATION: 0 risks: Class I (very low risk - 0.4% complication rate)    The patient has the following additional risks for perioperative complications:  Morbid obesity    She will present to the surgery center 4/17/18 - at 1:00 pm to meet with anesthesia  regarding risks due to BMI      ICD-10-CM    1. Preop general physical exam Z01.818        RECOMMENDATIONS:         APPROVAL GIVEN to proceed with proposed procedure, without further diagnostic evaluation       Signed Electronically by: Jackie Hitchcock NP    Copy of this evaluation report is provided to requesting physician.    Neo Preop Guidelines    Revised Cardiac Risk Index

## 2018-04-16 NOTE — MR AVS SNAPSHOT
After Visit Summary   4/16/2018    Vandana Jaeger    MRN: [id number]           Patient Information     Date Of Birth          1960        Visit Information        Provider Department      4/16/2018 2:30 PM Jackie Hitchcock NP [facility]        Today's Diagnoses     Preop general physical exam    -  1      Care Instructions      Before Your Surgery      Call your surgeon if there is any change in your health. This includes signs of a cold or flu (such as a sore throat, runny nose, cough, rash or fever).    Do not smoke, drink alcohol or take over the counter medicine (unless your surgeon or primary care doctor tells you to) for the 24 hours before and after surgery.    If you take prescribed drugs: Follow your doctor s orders about which medicines to take and which to stop until after surgery.    Eating and drinking prior to surgery: follow the instructions from your surgeon    Take a shower or bath the night before surgery. Use the soap your surgeon gave you to gently clean your skin. If you do not have soap from your surgeon, use your regular soap. Do not shave or scrub the surgery site.  Wear clean pajamas and have clean sheets on your bed.           Follow-ups after your visit        Your next 10 appointments already scheduled     Aug 08, 2018 10:00 AM CDT   LAB with HC LAB   [facility] )    [address] [facility] [address]   [phone]            Aug 08, 2018 10:30 AM CDT   DX HIP/PELVIS/SPINE with HIDX1   [facility] ([facility] )    [address]  [facility] [id number] [phone]           Please do not take any of the following 24 hours prior to the day of your exam: vitamins, calcium tablets, antacids.  If possible, please wear clothes without metal (snaps, zippers). A sweatsuit works well.            Aug 15, 2018 11:00 AM CDT   (Arrive by 10:45 AM)   Return Visit with Matilde Patrick NP   [facility]  [facility] ([facility] )    [address] [place] [address]   [phone]              Who to contact     If you have questions or need follow up information about today's clinic visit or your schedule please contact [facility] directly at [phone].  Normal or non-critical lab and imaging results will be communicated to you by MyChart, letter or phone within 4 business days after the clinic has received the results. If you do not hear from us within 7 days, please contact the clinic through MyChart or phone. If you have a critical or abnormal lab result, we will notify you by phone as soon as possible.  Submit refill requests through [employer] or call your pharmacy and they will forward the refill request to us. Please allow 3 business days for your refill to be completed.          Additional Information About Your Visit        MyChart Information     [employer] lets you send messages to your doctor, view your test results, renew your prescriptions, schedule appointments and more. To sign up, go to [email or web address] . Click on \"Log in\" on the left side of the screen, which will take you to the Welcome page. Then click on \"Sign up Now\" on the right side of the page.     You will be asked to enter the access code listed below, as well as some personal information. Please follow the directions to create your username and password.     Your access code is: [email or web address]  Expires: 2018 11:49 AM     Your access code will  in 90 days. If you need help or a new code, please call your [facility] or [phone].        Care EveryWhere ID     This is your Care EveryWhere ID. This could be used by other organizations to access your [place] medical records  [id number]        Your Vitals Were     Pulse Temperature Respirations Height BMI (Body Mass Index)       80 98.7  F (37.1  C) 14 5' 6\" (1.676 m) 47.78 kg/m2        Blood Pressure from Last 3 Encounters:   18 " 120/58   03/05/18 124/70   02/21/18 145/73    Weight from Last 3 Encounters:   04/16/18 296 lb (134.3 kg)   02/21/18 296 lb (134.3 kg)   02/20/18 295 lb (133.8 kg)              We Performed the Following     CBC with platelets differential     EKG 12-lead complete w/read - (Clinic Performed)        Primary Care Provider Office Phone # Fax #    Jackie Hitchcock -[phone] [address]  [place] [address]        Equal Access to Services     [facility]: Hadii loly love hadasho Sosrinivasa, waaxda luqadaha, qaybta kaalmada salvador, lb miller . So [facility] [place] [facility] [phone].    ATENCIÓN: Si habla español, tiene a whiting disposición servicios gratuitos de asistencia lingüística. [facility] [phone].    We comply with applicable federal civil rights laws and [place] laws. We do not discriminate on the basis of race, color, national origin, age, disability, sex, sexual orientation, or gender identity.            Thank you!     Thank you for choosing [facility]  for your care. Our goal is always to provide you with excellent care. Hearing back from our patients is one way we can continue to improve our services. Please take a few minutes to complete the written survey that you may receive in the mail after your visit with us. Thank you!             Your Updated Medication List - Protect others around you: Learn how to safely use, store and throw away your medicines at www.disposemymeds.org.          This list is accurate as of 4/16/18  2:39 PM.  Always use your most recent med list.                   Brand Name Dispense Instructions for use Diagnosis    * albuterol (2.5 MG/3ML) 0.083% neb solution     1 Box    Take 1 vial (2.5 mg) by nebulization every 4 hours as needed for shortness of breath / dyspnea or wheezing    Mild persistent asthma without complication       * VENTOLIN  (90 Base) MCG/ACT Inhaler   Generic drug:  albuterol     18 g    Inhale 2  puffs into the lungs every 4 hours as needed for shortness of breath / dyspnea or wheezing    Mild persistent asthma without complication       ASPIRIN PO      Take 81 mg by mouth daily        calcium-vitamin D 600-400 MG-UNIT per tablet    CALTRATE     Take 1 tablet by mouth 2 times daily        CVS vitamin  B12 1000 MCG Tabs   Generic drug:  cyanocobalamin     30 tablet    Take 1,000 mcg by mouth daily        DAILY MULTIVITAMIN PO      Take one tablet by oral route every day with food        doxylamine 25 MG Tabs tablet    UNISOM     Take 25 mg by mouth nightly as needed 1-2 at HS as needed        escitalopram 10 MG tablet    LEXAPRO    90 tablet    Take 1 Tab by mouth one time a day.    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    Benign essential hypertension       nystatin 215434 UNIT/GM Powd    MYCOSTATIN    60 g    Apply 100,000 g topically 2 times daily as needed    Candidiasis of skin and nails       OMEGA 3 PO      Take 1,000 mg by mouth daily        tamoxifen 20 MG tablet    NOLVADEX    90 tablet    Take 1 Tab by mouth one time a day.    Ductal carcinoma in situ of right breast       TYLENOL PO      Take 325 mg by mouth every 4 hours as needed for mild pain or fever        VITAMIN D (CHOLECALCIFEROL) PO      Take 5,000 Units by mouth daily        * Notice:  This list has 2 medication(s) that are the same as other medications prescribed for you. Read the directions carefully, and ask your doctor or other care provider to review them with you.

## 2018-04-16 NOTE — NURSING NOTE
"Chief Complaint   Patient presents with     Pre-Op Exam     4-23-18, [facility], L toe, Vania Nipp       Initial /58 (BP Location: Left arm, Patient Position: Sitting, Cuff Size: Adult Large)  Pulse 80  Temp 98.7  F (37.1  C)  Resp 14  Ht 5' 6\" (1.676 m)  Wt 296 lb (134.3 kg)  BMI 47.78 kg/m2 Estimated body mass index is 47.78 kg/(m^2) as calculated from the following:    Height as of this encounter: 5' 6\" (1.676 m).    Weight as of this encounter: 296 lb (134.3 kg).  Medication Reconciliation: complete     Lidia Rodas      "

## 2018-04-17 ASSESSMENT — PATIENT HEALTH QUESTIONNAIRE - PHQ9: SUM OF ALL RESPONSES TO PHQ QUESTIONS 1-9: 4

## 2018-04-20 DIAGNOSIS — D05.01 LOBULAR CARCINOMA IN SITU OF RIGHT BREAST: ICD-10-CM

## 2018-04-20 DIAGNOSIS — Z51.81 ENCOUNTER FOR MONITORING TAMOXIFEN THERAPY: ICD-10-CM

## 2018-04-20 DIAGNOSIS — Z79.810 ENCOUNTER FOR MONITORING TAMOXIFEN THERAPY: ICD-10-CM

## 2018-04-20 DIAGNOSIS — F33.8 SEASONAL AFFECTIVE DISORDER (H): ICD-10-CM

## 2018-04-20 DIAGNOSIS — D05.11 DUCTAL CARCINOMA IN SITU OF RIGHT BREAST: ICD-10-CM

## 2018-04-23 ENCOUNTER — APPOINTMENT (OUTPATIENT)
Dept: ANESTHESIOLOGY | Facility: HOSPITAL | Age: 58
End: 2018-04-23
Attending: PODIATRIST
Payer: COMMERCIAL

## 2018-04-23 PROCEDURE — 01480 ANES OPEN PX LOWER L/A/F NOS: CPT | Mod: QZ | Performed by: NURSE ANESTHETIST, CERTIFIED REGISTERED

## 2018-04-23 RX ORDER — ESCITALOPRAM OXALATE 10 MG/1
TABLET ORAL
Qty: 90 TABLET | Refills: 1 | Status: SHIPPED | OUTPATIENT
Start: 2018-04-23 | End: 2018-10-23

## 2018-05-18 DIAGNOSIS — B37.2 CANDIDIASIS OF SKIN AND NAILS: ICD-10-CM

## 2018-05-18 RX ORDER — NYSTATIN 100000 [USP'U]/G
100000 POWDER TOPICAL 2 TIMES DAILY PRN
Qty: 60 G | Refills: 3 | Status: SHIPPED | OUTPATIENT
Start: 2018-05-18 | End: 2021-10-13

## 2018-05-18 NOTE — TELEPHONE ENCOUNTER
nystatin (MYCOSTATIN) 899709 UNIT/GM POWD     Last Written Prescription Date:  02/24/2015  Last Fill Quantity: 60 gram,   # refills: 2  Last Office Visit: 04/16/2018  Future Office visit:    Next 5 appointments (look out 90 days)     Aug 15, 2018 11:00 AM CDT   (Arrive by 10:45 AM)   Return Visit with Matilde Patrick NP   [facility] ([facility] )    [address] [place] [address]   [phone]                   Routing refill request to provider for review/approval because:

## 2018-05-31 DIAGNOSIS — J45.30 MILD PERSISTENT ASTHMA WITHOUT COMPLICATION: ICD-10-CM

## 2018-05-31 NOTE — TELEPHONE ENCOUNTER
Ventolin HFA  Last Written Prescription Date: 821/17  Last Fill Quantity: 18g # of Refills: 0  Last Office Visit: 4/16/18

## 2018-06-01 RX ORDER — ALBUTEROL SULFATE 90 UG/1
AEROSOL, METERED RESPIRATORY (INHALATION)
Qty: 18 G | Refills: 1 | Status: SHIPPED | OUTPATIENT
Start: 2018-06-01 | End: 2019-07-09

## 2018-06-05 ENCOUNTER — TRANSFERRED RECORDS (OUTPATIENT)
Dept: HEALTH INFORMATION MANAGEMENT | Facility: CLINIC | Age: 58
End: 2018-06-05

## 2018-06-16 ENCOUNTER — TRANSFERRED RECORDS (OUTPATIENT)
Dept: HEALTH INFORMATION MANAGEMENT | Facility: CLINIC | Age: 58
End: 2018-06-16

## 2018-06-16 LAB
CREAT SERPL-MCNC: 0.71 MG/DL (ref 0.4–1)
GLUCOSE SERPL-MCNC: 112 MG/DL (ref 70–100)
POTASSIUM SERPL-SCNC: 3.9 MEQ/L (ref 3.4–5.1)

## 2018-08-08 DIAGNOSIS — D05.11 DUCTAL CARCINOMA IN SITU OF RIGHT BREAST: ICD-10-CM

## 2018-08-08 DIAGNOSIS — Z79.810 USE OF TAMOXIFEN (NOLVADEX): ICD-10-CM

## 2018-08-08 LAB
BASOPHILS # BLD AUTO: 0 10E9/L (ref 0–0.2)
BASOPHILS NFR BLD AUTO: 0.2 %
DIFFERENTIAL METHOD BLD: NORMAL
EOSINOPHIL # BLD AUTO: 0.1 10E9/L (ref 0–0.7)
EOSINOPHIL NFR BLD AUTO: 1.4 %
ERYTHROCYTE [DISTWIDTH] IN BLOOD BY AUTOMATED COUNT: 12.9 % (ref 10–15)
HCT VFR BLD AUTO: 39.2 % (ref 35–47)
HGB BLD-MCNC: 13 G/DL (ref 11.7–15.7)
LYMPHOCYTES # BLD AUTO: 2.8 10E9/L (ref 0.8–5.3)
LYMPHOCYTES NFR BLD AUTO: 31.5 %
MCH RBC QN AUTO: 30.2 PG (ref 26.5–33)
MCHC RBC AUTO-ENTMCNC: 33.2 G/DL (ref 31.5–36.5)
MCV RBC AUTO: 91 FL (ref 78–100)
MONOCYTES # BLD AUTO: 0.7 10E9/L (ref 0–1.3)
MONOCYTES NFR BLD AUTO: 7.6 %
NEUTROPHILS # BLD AUTO: 5.3 10E9/L (ref 1.6–8.3)
NEUTROPHILS NFR BLD AUTO: 59.3 %
PLATELET # BLD AUTO: 320 10E9/L (ref 150–450)
RBC # BLD AUTO: 4.3 10E12/L (ref 3.8–5.2)
WBC # BLD AUTO: 8.9 10E9/L (ref 4–11)

## 2018-08-08 PROCEDURE — 85025 COMPLETE CBC W/AUTO DIFF WBC: CPT | Performed by: NURSE PRACTITIONER

## 2018-08-08 PROCEDURE — 86300 IMMUNOASSAY TUMOR CA 15-3: CPT | Mod: 90 | Performed by: NURSE PRACTITIONER

## 2018-08-08 PROCEDURE — 80053 COMPREHEN METABOLIC PANEL: CPT | Performed by: NURSE PRACTITIONER

## 2018-08-08 PROCEDURE — 36415 COLL VENOUS BLD VENIPUNCTURE: CPT | Performed by: NURSE PRACTITIONER

## 2018-08-08 PROCEDURE — 99000 SPECIMEN HANDLING OFFICE-LAB: CPT | Performed by: NURSE PRACTITIONER

## 2018-08-09 LAB
ALBUMIN SERPL-MCNC: 4.3 G/DL (ref 3.4–5)
ALP SERPL-CCNC: 44 U/L (ref 40–150)
ALT SERPL W P-5'-P-CCNC: 31 U/L (ref 0–50)
ANION GAP SERPL CALCULATED.3IONS-SCNC: 10 MMOL/L (ref 3–14)
AST SERPL W P-5'-P-CCNC: 21 U/L (ref 0–45)
BILIRUB SERPL-MCNC: 0.3 MG/DL (ref 0.2–1.3)
BUN SERPL-MCNC: 29 MG/DL (ref 7–30)
CALCIUM SERPL-MCNC: 9.1 MG/DL (ref 8.5–10.1)
CANCER AG27-29 SERPL-ACNC: 24 U/ML (ref 0–39)
CHLORIDE SERPL-SCNC: 107 MMOL/L (ref 94–109)
CO2 SERPL-SCNC: 21 MMOL/L (ref 20–32)
CREAT SERPL-MCNC: 0.72 MG/DL (ref 0.52–1.04)
GFR SERPL CREATININE-BSD FRML MDRD: 83 ML/MIN/1.7M2
GLUCOSE SERPL-MCNC: 111 MG/DL (ref 70–99)
POTASSIUM SERPL-SCNC: 4 MMOL/L (ref 3.4–5.3)
PROT SERPL-MCNC: 8.1 G/DL (ref 6.8–8.8)
SODIUM SERPL-SCNC: 138 MMOL/L (ref 133–144)

## 2018-08-10 DIAGNOSIS — I10 BENIGN ESSENTIAL HYPERTENSION: ICD-10-CM

## 2018-08-10 DIAGNOSIS — D05.11 DUCTAL CARCINOMA IN SITU OF RIGHT BREAST: ICD-10-CM

## 2018-08-13 NOTE — TELEPHONE ENCOUNTER
tamoxifen      Last Written Prescription Date:  1/3/18  Last Fill Quantity: 90,   # refills: 1  Last Office Visit: 2/21/18  Future Office visit:    Next 5 appointments (look out 90 days)     Aug 15, 2018 11:00 AM CDT   (Arrive by 10:45 AM)   Return Visit with Matilde Patrick NP   [facility] ([facility] )    [address] [place] [address]   [phone]

## 2018-08-13 NOTE — TELEPHONE ENCOUNTER
lisinopril      Last Written Prescription Date:  2/20/18  Last Fill Quantity: 90,   # refills: 1  Last Office Visit: 4/16/18  Future Office visit:    Next 5 appointments (look out 90 days)     Aug 15, 2018 11:00 AM CDT   (Arrive by 10:45 AM)   Return Visit with Matilde Patrick NP   [facility] ([facility] )    [address] [place] [address]   [phone]

## 2018-08-14 RX ORDER — LISINOPRIL 10 MG/1
TABLET ORAL
Qty: 90 TABLET | Refills: 1 | Status: SHIPPED | OUTPATIENT
Start: 2018-08-14 | End: 2018-10-03

## 2018-08-14 RX ORDER — TAMOXIFEN CITRATE 20 MG/1
TABLET ORAL
Qty: 90 TABLET | Refills: 1 | Status: SHIPPED | OUTPATIENT
Start: 2018-08-14 | End: 2019-01-25

## 2018-08-15 ENCOUNTER — ONCOLOGY VISIT (OUTPATIENT)
Dept: ONCOLOGY | Facility: OTHER | Age: 58
End: 2018-08-15
Attending: NURSE PRACTITIONER
Payer: COMMERCIAL

## 2018-08-15 ENCOUNTER — HOSPITAL ENCOUNTER (OUTPATIENT)
Dept: BONE DENSITY | Facility: HOSPITAL | Age: 58
Discharge: HOME | End: 2018-08-15
Attending: NURSE PRACTITIONER | Admitting: NURSE PRACTITIONER
Payer: COMMERCIAL

## 2018-08-15 DIAGNOSIS — Z17.0 MALIGNANT NEOPLASM OF RIGHT BREAST IN FEMALE, ESTROGEN RECEPTOR POSITIVE, UNSPECIFIED SITE OF BREAST (H): ICD-10-CM

## 2018-08-15 DIAGNOSIS — Z12.31 ENCOUNTER FOR SCREENING MAMMOGRAM FOR BREAST CANCER: ICD-10-CM

## 2018-08-15 DIAGNOSIS — D05.11 DUCTAL CARCINOMA IN SITU OF RIGHT BREAST: Primary | ICD-10-CM

## 2018-08-15 DIAGNOSIS — Z78.0 POSTMENOPAUSAL STATUS: ICD-10-CM

## 2018-08-15 DIAGNOSIS — D05.11 DUCTAL CARCINOMA IN SITU OF RIGHT BREAST: ICD-10-CM

## 2018-08-15 DIAGNOSIS — C50.911 MALIGNANT NEOPLASM OF RIGHT BREAST IN FEMALE, ESTROGEN RECEPTOR POSITIVE, UNSPECIFIED SITE OF BREAST (H): ICD-10-CM

## 2018-08-15 DIAGNOSIS — Z79.810 USE OF TAMOXIFEN (NOLVADEX): ICD-10-CM

## 2018-08-15 PROCEDURE — 77080 DXA BONE DENSITY AXIAL: CPT | Mod: TC

## 2018-08-15 PROCEDURE — 99214 OFFICE O/P EST MOD 30 MIN: CPT | Performed by: NURSE PRACTITIONER

## 2018-08-15 ASSESSMENT — PAIN SCALES - GENERAL: PAINLEVEL: NO PAIN (0)

## 2018-08-15 NOTE — PATIENT INSTRUCTIONS
We would like to see you back in 6 months. Please come 1 week prior for lab work. When you are in need of a refill, please call your pharmacy and they will send us a request. If you have any questions please call [phone]  Other instructions: Mammogram due in December 2018 call [phone] to schedule your Mamm appointment

## 2018-08-15 NOTE — PROGRESS NOTES
Oncology Follow-up Visit:  August 15, 2018    Reason for Visit:  Patient presents with:  RECHECK: Follow up Ductal carcinoma in situ of right breast     Nursing Note and documentation reviewed: yes    HPI:  This is a 58-year-old female patient who presents to the oncology/hematology clinic today in followup of breast cancer.  Patient was diagnosed with DCIS and LCIS in January 2014 and had a lumpectomy performed in February 2014. She was placed on tamoxifen 20 mg daily in March of 2014.      She presents to the clinic today telling me she is feeling well.  She is now following with a weight management provider and states she has lost 12 pounds.  She has no specific exercise program but states she is much more active than she used to be.  She tells me she was placed on medication for arthritis pain and this upset her stomach immensely so she discontinued it on Sunday and has less stomach issues already.  She does have hot flashes on occasion but states these are tolerable.  DEXA scan will be completed today.  She is due for screening mammogram in December.  She has not had a GYN exam as recommended at her last visit.    Oncologic History:     Vandana had undergone an abnormal mammogram in November 2013 which showed microcalcifications suspicious for malignancy. She subsequently underwent stereotactic biopsy on November 21, 2013 with original interpretation of histopathology being that of hyperplasia without atypia. This was then over read as part of routine protocol in January 2014. 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 She was evaluated by Dr. Shanika Beaver in Gen. Surgery on January 28, 2014 and bilateral MRI of the breast was obtained which revealed oval-shaped nodular densities in both breasts consistent with inframammary lymph nodes.       The patient was seen by Dr. Chávez of radiation oncology on 1/31/2014 and his impression was that the patient had DCIS and would  require definitive surgery.      The patient underwent excisional breast biopsy on 2/13/2014 and pathology showed LCIS, multifocal, completely excised and low-grade, grade 1, DCIS which was completely excised. Tumor size was at least 0.5 cm DCIS grade 1; margins were uninvolved by DCIS. Estrogen receptor was positive at 90% and progesterone receptor was positive at 50%. Patient was staged pathologic Tis associated with LCIS. She was started on tamoxifen.      Current Chemo Regime/TX: Tamoxifen 20 mg daily started March 2014  Current Cycle: n/a  # of completed cycles: n/a      Previous treatment:  n/a    Past Medical History:   Diagnosis Date     Anxiety state, unspecified      Benign essential hypertension 11/24/2015     Contact dermatitis and other eczema, due to unspecified cause      Ductal carcinoma in situ of right breast 1/28/2014     Ductal carcinoma in situ of right breast 1/28/2014     H/O herpes zoster      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 1/28/2014     Morbid obesity (H) 1/28/2014     Other B-complex deficiencies 8/19/2014     Other specified disease of hair and hair follicles      Primary central sleep apnea 1/26/2016     Reactive airway disease that is not asthma      Problem list name updated by automated process. Provider to review     Unspecified asthma(493.90)      Unspecified immunity deficiency      Vitamin D deficiency 1/26/2016       Past Surgical History:   Procedure Laterality Date     BIOPSY      right breast--neg     BIOPSY BREAST NEEDLE LOCALIZATION  2/13/2014    Procedure: BIOPSY BREAST NEEDLE LOCALIZATION;  WIRE LOCALIZED SEGMENTAL RESECTION, RIGHT BREAST;  Surgeon: Shanika Beaver MD;  Location: [facility]     CHOLECYSTECTOMY  2010     COLONOSCOPY  2009     COLONOSCOPY  11/12/2013     hysterectomy with D and C  03/2017    [facility]     ORTHOPEDIC SURGERY      heel surgery     ORTHOPEDIC SURGERY      rt knee meniiscus tear       Family History   Problem Relation Age of Onset      LUIS ALFREDO.ASTEPHANIE. Father      Coronary Artery Disease Father      Cerebrovascular Disease Father      Prostate Cancer Father      Hypertension Mother      Cerebrovascular Disease Mother      Breast Cancer Mother      Thyroid Disease Other      Thyroid Disease Sister      Diabetes No family hx of      Asthma No family hx of      Hyperlipidemia No family hx of      Colon Cancer No family hx of      Other Cancer No family hx of      Anesthesia Reaction No family hx of      Genetic Disorder No family hx of        Social History     Social History     Marital status: Single     Spouse name: N/A     Number of children: N/A     Years of education: N/A     Occupational History     Not on file.     Social History Main Topics     Smoking status: Former Smoker     Packs/day: 0.10     Years: 30.00     Types: Cigarettes     Start date: 8/15/1974     Quit date: 3/3/2013     Smokeless tobacco: Never Used     Alcohol use Yes     Drug use: No     Sexual activity: Yes     Partners: Male     Other Topics Concern     Parent/Sibling W/ Cabg, Mi Or Angioplasty Before 65f 55m? No     Social History Narrative       Current Outpatient Prescriptions   Medication     Acetaminophen (TYLENOL PO)     albuterol (2.5 MG/3ML) 0.083% nebulizer solution     ASPIRIN PO     calcium-vitamin D (CALTRATE) 600-400 MG-UNIT per tablet     cyanocobalamin ([employer]S VITAMIN  B12) 1000 MCG TABS     doxylamine (UNISOM) 25 MG TABS tablet     escitalopram (LEXAPRO) 10 MG tablet     lisinopril (PRINIVIL/ZESTRIL) 10 MG tablet     Multiple Vitamin (DAILY MULTIVITAMIN PO)     nystatin (MYCOSTATIN) 495052 UNIT/GM POWD     Omega-3 Fatty Acids (OMEGA 3 PO)     tamoxifen (NOLVADEX) 20 MG tablet     UNABLE TO FIND     VENTOLIN  (90 Base) MCG/ACT Inhaler     VITAMIN D, CHOLECALCIFEROL, PO     No current facility-administered medications for this visit.         Allergies   Allergen Reactions     Adhesive Tape      Steri strips left scar.     Latex      Possible allergy.       Review  "Of Systems:  Constitutional: denies fever, chills, and night sweats.  Eyes: denies blurred or double vision  Ears/Nose/Throat: denies ear pain, nose problems, difficulty swallowing  Respiratory: denies shortness of breath, cough   Skin: denies rash, lesions  Breast/Chest wall: denies pain, lumps or discharge  Cardiovascular: denies chest pain, palpitations, edema  Gastrointestinal: see hPI  Genitourinary: denies difficulty with urination, blood in urine  Musculoskeletal:denies new muscle pain, bone pain-generalized arthritis  Neurologic: denies lightheadedness, headaches, numbness or tingling  Psychiatric: denies anxiety, depression-she is doing well  Hematologic/Lymphatic/Immunologic: denies easy bruising, easy bleeding, lumps or bumps noted  Endocrine: Does complain of dry mouth and some increased thirst and drinks a lot of water      Physical Exam:  Estimated body mass index is 47.16 kg/(m^2) as calculated from the following:    Height as of this encounter: 1.676 m (5' 6\").    Weight as of this encounter: 132.5 kg (292 lb 3.2 oz).    GENERAL APPEARANCE: Healthy, alert and in no acute distress.  HEENT: Normocephalic, Sclerae anicteric. Oropharynx without ulcers, lesions, or thrush.  NECK:  No asymmetry or masses, no thyromegaly.  LYMPHATICS: No palpable cervical, supraclavicular, axillary, or inguinal nodes   RESP: Lungs clear to auscultation bilaterally, respirations regular and easy  CARDIOVASCULAR: Regular rate and rhythm. Normal S1, S2; no murmur, gallop, or rub.  ABDOMEN: Soft, nontender. Bowel sounds auscultated all 4 quadrants. No palpable organomegaly or masses.  BREAST/Chest wall: no concerning lumps, masses or tenderness bilaterally  MUSCULOSKELETAL: Extremities without gross deformities noted. No edema of bilateral lower extremities.  SKIN:   Few noted areas of erythematous papules to her left axilla.  NEURO: Alert and oriented x 3.  Gait steady.  PSYCHIATRIC: Mentation and affect appear normal.  Mood " appropriate.    Laboratory:  Results for orders placed or performed in visit on 08/08/18   CBC with platelets differential   Result Value Ref Range    WBC 8.9 4.0 - 11.0 10e9/L    RBC Count 4.30 3.8 - 5.2 10e12/L    Hemoglobin 13.0 11.7 - 15.7 g/dL    Hematocrit 39.2 35.0 - 47.0 %    MCV 91 78 - 100 fl    MCH 30.2 26.5 - 33.0 pg    MCHC 33.2 31.5 - 36.5 g/dL    RDW 12.9 10.0 - 15.0 %    Platelet Count 320 150 - 450 10e9/L    Diff Method Automated Method     % Neutrophils 59.3 %    % Lymphocytes 31.5 %    % Monocytes 7.6 %    % Eosinophils 1.4 %    % Basophils 0.2 %    Absolute Neutrophil 5.3 1.6 - 8.3 10e9/L    Absolute Lymphocytes 2.8 0.8 - 5.3 10e9/L    Absolute Monocytes 0.7 0.0 - 1.3 10e9/L    Absolute Eosinophils 0.1 0.0 - 0.7 10e9/L    Absolute Basophils 0.0 0.0 - 0.2 10e9/L   Comprehensive metabolic panel   Result Value Ref Range    Sodium 138 133 - 144 mmol/L    Potassium 4.0 3.4 - 5.3 mmol/L    Chloride 107 94 - 109 mmol/L    Carbon Dioxide 21 20 - 32 mmol/L    Anion Gap 10 3 - 14 mmol/L    Glucose 111 (H) 70 - 99 mg/dL    Urea Nitrogen 29 7 - 30 mg/dL    Creatinine 0.72 0.52 - 1.04 mg/dL    GFR Estimate 83 >60 mL/min/1.7m2    GFR Estimate If Black >90 >60 mL/min/1.7m2    Calcium 9.1 8.5 - 10.1 mg/dL    Bilirubin Total 0.3 0.2 - 1.3 mg/dL    Albumin 4.3 3.4 - 5.0 g/dL    Protein Total 8.1 6.8 - 8.8 g/dL    Alkaline Phosphatase 44 40 - 150 U/L    ALT 31 0 - 50 U/L    AST 21 0 - 45 U/L   Ca27.29  breast tumor marker   Result Value Ref Range    CA 27-29 24 0 - 39 U/mL       Imaging Studies:  None for today      ASSESSMENT/PLAN:    #1 Breast cancer: Ductal carcinoma in situ of the right breast and lobular carcinoma in situ of the right breast diagnosed January 2014. Patient underwent lumpectomy. She was then placed on tamoxifen 20mg daily and patient continues on this to date. We will follow up in 6 months with a CBC, CMP and CA 27.29.  Patient will have her screening mammogram in December 2018.      #2 Use of  Tamoxifen:   Once again I recommended a yearly GYN exam.  DEXA scan will be completed today and will call her with that result.  We discussed the unlikely contribution of the tamoxifen to bone loss.    I encouraged patient to call with any questions or concerns.    Approximately 25 minutes spent with the patient, with >75% of this time spent in counseling and discussion of her breast cancer diagnosis and the plan for continued surveillance.  We also discussed the psychosocial effects of the diagnosis along with her concerns of possible recurrence.  We discussed the side effects of the tamoxifen including  small risk of uterine cancer and the importance of yearly GYN exam.  We also discussed the importance of  seeking medical attention with any vaginal discharge.  Patient was commended on her weight loss and encouraged to continue.    Matilde GALVEZ, FNP-BC, AOCNP

## 2018-08-15 NOTE — MR AVS SNAPSHOT
After Visit Summary   8/15/2018    Vandana Jaeger    MRN: [id number]           Patient Information     Date Of Birth          1960        Visit Information        Provider Department      8/15/2018 11:00 AM Matilde Patrick NP [facility]        Today's Diagnoses     Ductal carcinoma in situ of right breast    -  1    Encounter for screening mammogram for breast cancer        Malignant neoplasm of right breast in female, estrogen receptor positive, unspecified site of breast (H)          Care Instructions    We would like to see you back in 6 months. Please come 1 week prior for lab work. When you are in need of a refill, please call your pharmacy and they will send us a request. If you have any questions please call [phone]  Other instructions: Mammogram due in December 2018 call [phone] to schedule your Mamm appointment          Follow-ups after your visit        Your next 10 appointments already scheduled     Aug 15, 2018  1:00 PM CDT   DX HIP/PELVIS/SPINE with HIDX1   [facility] ([facility] )    [address] [facility] [id number] [phone]           Please do not take any of the following 24 hours prior to the day of your exam: vitamins, calcium tablets, antacids.  If possible, please wear clothes without metal (snaps, zippers). A sweatsuit works well.            Feb 07, 2019 11:00 AM CST   LAB with [facility]   [facility] ([facility] )    [address] [facility] [address]   [phone]            Feb 14, 2019 11:00 AM CST   (Arrive by 10:45 AM)   Return Visit with Matilde Patrick NP   [facility] ([facility] )    [address] [address]   [phone]              Future tests that were ordered for you today     Open Future Orders        Priority Expected Expires Ordered    CBC with platelets differential Routine 2/15/2019 4/15/2019 8/15/2018    Comprehensive  metabolic panel Routine 2/15/2019 4/15/2019 8/15/2018    Ca27.29  breast tumor marker Routine 2/15/2019 4/15/2019 8/15/2018    MA Screen Bilateral w/Sen Routine 12/18/2018 8/15/2019 8/15/2018            Who to contact     If you have questions or need follow up information about today's clinic visit or your schedule please contact [facility] directly at [phone].  Normal or non-critical lab and imaging results will be communicated to you by MyChart, letter or phone within 4 business days after the clinic has received the results. If you do not hear from us within 7 days, please contact the clinic through MyChart or phone. If you have a critical or abnormal lab result, we will notify you by phone as soon as possible.  Submit refill requests through [employer] or call your pharmacy and they will forward the refill request to us. Please allow 3 business days for your refill to be completed.          Additional Information About Your Visit        Care EveryWhere ID     This is your Care EveryWhere ID. This could be used by other organizations to access your [facility] medical records  [id number]         Blood Pressure from Last 3 Encounters:   08/15/18 158/80   04/16/18 120/58   03/05/18 124/70    Weight from Last 3 Encounters:   04/16/18 134.3 kg (296 lb)   02/21/18 134.3 kg (296 lb)   02/20/18 133.8 kg (295 lb)               Primary Care Provider Office Phone # Fax #    Jackie AvalosANABELLE mcdowell [phone] [address]  [place] [address]        Equal Access to Services     [facility] AH: Hadii aad ku hadasho Soomaali, waaxda luqadaha, qaybta kaalmada adeegyada, lb peter. So [facility] [phone].    ATENCIÓN: Si habla español, tiene a hwiting disposición servicios gratuitos de asistencia lingüística. Llame al [phone].    We comply with applicable federal civil rights laws and [place] laws. We do not discriminate on the basis of race, color,  national origin, age, disability, sex, sexual orientation, or gender identity.            Thank you!     Thank you for choosing [facility]  for your care. Our goal is always to provide you with excellent care. Hearing back from our patients is one way we can continue to improve our services. Please take a few minutes to complete the written survey that you may receive in the mail after your visit with us. Thank you!             Your Updated Medication List - Protect others around you: Learn how to safely use, store and throw away your medicines at www.disposemymeds.org.          This list is accurate as of 8/15/18 11:40 AM.  Always use your most recent med list.                   Brand Name Dispense Instructions for use Diagnosis    * albuterol (2.5 MG/3ML) 0.083% neb solution     1 Box    Take 1 vial (2.5 mg) by nebulization every 4 hours as needed for shortness of breath / dyspnea or wheezing    Mild persistent asthma without complication       * VENTOLIN  (90 Base) MCG/ACT inhaler   Generic drug:  albuterol     18 g    Inhale 2 Puffs into the lungs every four hours as needed for shortness of breath / dyspnea or wheezing    Mild persistent asthma without complication       ASPIRIN PO      Take 81 mg by mouth daily        calcium-vitamin D 600-400 MG-UNIT per tablet    CALTRATE     Take 1 tablet by mouth 2 times daily        CVS vitamin  B12 1000 MCG Tabs   Generic drug:  cyanocobalamin     30 tablet    Take 1,000 mcg by mouth daily        DAILY MULTIVITAMIN PO      Take one tablet by oral route every day with food        doxylamine 25 MG Tabs tablet    UNISOM     Take 25 mg by mouth nightly as needed 1-2 at HS as needed        escitalopram 10 MG tablet    LEXAPRO    90 tablet    Take 1 Tab by mouth one time a day.    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, Ductal carcinoma in situ of right breast, Encounter for monitoring tamoxifen therapy, Seasonal affective disorder (H)       lisinopril  10 MG tablet    PRINIVIL/ZESTRIL    90 tablet    Take 1 Tab by mouth one time a day.    Benign essential hypertension       nystatin 809907 UNIT/GM Powd    MYCOSTATIN    60 g    Apply 100,000 g topically 2 times daily as needed    Candidiasis of skin and nails       OMEGA 3 PO      Take 1,000 mg by mouth daily        tamoxifen 20 MG tablet    NOLVADEX    90 tablet    Take 1 Tab by mouth one time a day.    Ductal carcinoma in situ of right breast       TYLENOL PO      Take 325 mg by mouth every 4 hours as needed for mild pain or fever        UNABLE TO FIND      MEDICATION NAME: phedermine 15mg take 1/2 tablet once daily        VITAMIN D (CHOLECALCIFEROL) PO      Take 5,000 Units by mouth daily        * Notice:  This list has 2 medication(s) that are the same as other medications prescribed for you. Read the directions carefully, and ask your doctor or other care provider to review them with you.

## 2018-08-15 NOTE — NURSING NOTE
"    Chief Complaint   Patient presents with     RECHECK     Follow up Ductal carcinoma in situ of right breast       Initial /80  Pulse 101  Temp 98.4  F (36.9  C) (Tympanic)  Resp 20  Ht 1.676 m (5' 6\")  Wt 132.5 kg (292 lb 3.2 oz)  SpO2 97%  BMI 47.16 kg/m2 Estimated body mass index is 47.16 kg/(m^2) as calculated from the following:    Height as of this encounter: 1.676 m (5' 6\").    Weight as of this encounter: 132.5 kg (292 lb 3.2 oz).  Medication Reconciliation: complete  Immunizations reviewed, given advanced directives today, pain = 0, PHQ9 = 3.  Recheck /80    Darling Bain LPN    "

## 2018-08-16 VITALS
WEIGHT: 292.2 LBS | RESPIRATION RATE: 20 BRPM | BODY MASS INDEX: 46.96 KG/M2 | SYSTOLIC BLOOD PRESSURE: 158 MMHG | TEMPERATURE: 98.4 F | OXYGEN SATURATION: 97 % | HEART RATE: 101 BPM | HEIGHT: 66 IN | DIASTOLIC BLOOD PRESSURE: 80 MMHG

## 2018-08-16 ASSESSMENT — PATIENT HEALTH QUESTIONNAIRE - PHQ9: SUM OF ALL RESPONSES TO PHQ QUESTIONS 1-9: 3

## 2018-10-03 ENCOUNTER — OFFICE VISIT (OUTPATIENT)
Dept: FAMILY MEDICINE | Facility: OTHER | Age: 58
End: 2018-10-03
Attending: NURSE PRACTITIONER
Payer: COMMERCIAL

## 2018-10-03 ENCOUNTER — RADIANT APPOINTMENT (OUTPATIENT)
Dept: GENERAL RADIOLOGY | Facility: OTHER | Age: 58
End: 2018-10-03
Attending: NURSE PRACTITIONER
Payer: COMMERCIAL

## 2018-10-03 VITALS
TEMPERATURE: 98.7 F | RESPIRATION RATE: 14 BRPM | WEIGHT: 287 LBS | DIASTOLIC BLOOD PRESSURE: 80 MMHG | OXYGEN SATURATION: 97 % | HEART RATE: 97 BPM | BODY MASS INDEX: 46.32 KG/M2 | SYSTOLIC BLOOD PRESSURE: 140 MMHG

## 2018-10-03 DIAGNOSIS — I10 BENIGN ESSENTIAL HYPERTENSION: ICD-10-CM

## 2018-10-03 DIAGNOSIS — R09.89 CHEST CONGESTION: Primary | ICD-10-CM

## 2018-10-03 DIAGNOSIS — F33.1 MODERATE EPISODE OF RECURRENT MAJOR DEPRESSIVE DISORDER (H): ICD-10-CM

## 2018-10-03 DIAGNOSIS — J20.9 ACUTE BRONCHITIS, UNSPECIFIED ORGANISM: ICD-10-CM

## 2018-10-03 PROBLEM — F43.23 ADJUSTMENT DISORDER WITH MIXED ANXIETY AND DEPRESSED MOOD: Status: RESOLVED | Noted: 2018-02-25 | Resolved: 2018-10-03

## 2018-10-03 PROCEDURE — 99214 OFFICE O/P EST MOD 30 MIN: CPT | Performed by: NURSE PRACTITIONER

## 2018-10-03 PROCEDURE — 71046 X-RAY EXAM CHEST 2 VIEWS: CPT | Mod: TC

## 2018-10-03 RX ORDER — ALBUTEROL SULFATE 0.83 MG/ML
2.5 SOLUTION RESPIRATORY (INHALATION) EVERY 4 HOURS PRN
Qty: 1 BOX | Refills: 3 | Status: SHIPPED | OUTPATIENT
Start: 2018-10-03 | End: 2021-10-13

## 2018-10-03 RX ORDER — LISINOPRIL 20 MG/1
20 TABLET ORAL DAILY
Qty: 90 TABLET | Refills: 1 | Status: SHIPPED | OUTPATIENT
Start: 2018-10-03 | End: 2019-04-25

## 2018-10-03 ASSESSMENT — ANXIETY QUESTIONNAIRES
IF YOU CHECKED OFF ANY PROBLEMS ON THIS QUESTIONNAIRE, HOW DIFFICULT HAVE THESE PROBLEMS MADE IT FOR YOU TO DO YOUR WORK, TAKE CARE OF THINGS AT HOME, OR GET ALONG WITH OTHER PEOPLE: NOT DIFFICULT AT ALL
4. TROUBLE RELAXING: NOT AT ALL
7. FEELING AFRAID AS IF SOMETHING AWFUL MIGHT HAPPEN: NOT AT ALL
5. BEING SO RESTLESS THAT IT IS HARD TO SIT STILL: NOT AT ALL
GAD7 TOTAL SCORE: 0
3. WORRYING TOO MUCH ABOUT DIFFERENT THINGS: NOT AT ALL
6. BECOMING EASILY ANNOYED OR IRRITABLE: NOT AT ALL
1. FEELING NERVOUS, ANXIOUS, OR ON EDGE: NOT AT ALL
2. NOT BEING ABLE TO STOP OR CONTROL WORRYING: NOT AT ALL

## 2018-10-03 NOTE — MR AVS SNAPSHOT
After Visit Summary   10/3/2018    Vandana Jaeger    MRN: [id number]           Patient Information     Date Of Birth          1960        Visit Information        Provider Department      10/3/2018 11:30 AM Jackie Hitchcock NP [facility]        Today's Diagnoses     Chest congestion    -  1    Acute bronchitis, unspecified organism        Benign essential hypertension        Moderate episode of recurrent major depressive disorder (H)          Care Instructions    Results for orders placed or performed in visit on 10/03/18 (from the past 24 hour(s))   XR CHEST 2 VW (Clinic Performed)    Narrative    PROCEDURE:  XR CHEST 2 VW    HISTORY:  ; Chest congestion.     COMPARISON:  2015    FINDINGS:   The cardiac silhouette is normal in size. The pulmonary vasculature is  normal.  The lungs are clear. No pleural effusion or pneumothorax.      Impression    IMPRESSION:  No acute cardiopulmonary disease.      RHONDA LINARES MD       1. Chest congestion  - XR CHEST 2 VW (Clinic Performed)    2. Acute bronchitis, unspecified organism  Use nebs and inhaler as needed  Fluids  Rest  Humidity at home - add bacteriostatic solution to humidifier  OTC Mucinex liquid cough and cold  Add Zicam or other zinc daily until you feel better  Please go to the ER or UC if your symptoms worsen   Please return to clinic if unimproved  Zithromax Rx given to fill if symptoms increase    3. Benign essential hypertension - Goal BP is 120's/70's  - lisinopril (PRINIVIL/ZESTRIL) 20 MG tablet; Take 1 tablet (20 mg) by mouth daily  Dispense: 90 tablet; Refill: 1 - dose increase  - BP recheck 2 weeks, please call my nurse with reading    4. Moderate episode of recurrent major depressive disorder (H)  - Continue plan of care      Follow-up 3 months - am fasting      Malina Blanco, RN  Student Family Nurse Practitioner      Jackie Hitchcock NP  [facility]            Follow-ups after your visit       "  Your next 10 appointments already scheduled     Dec 11, 2018 10:00 AM CST   (Arrive by 9:45 AM)   MA SCREENING BILATERAL W/ VIKA with HCMA1   [facility] ([facility] )    [address] [place] [address]   [phone]           How do I prepare for my exam? (Food and drink instructions) No Food and Drink Restrictions.  How do I prepare for my exam? (Other instructions) Do not use any powder, lotion or deodorant under your arms or on your breast. If you do, we will ask you to remove it before your exam.  What should I wear: Wear comfortable, two-piece clothing.  How long does the exam take: Most scans will take 15 minutes.  What should I bring: Bring any previous mammograms from other facilities or have them mailed to the breast center.  Do I need a :  No  is needed.  What do I need to tell my doctor: If you have any allergies, tell your care team.  What should I do after the exam: No restrictions, You may resume normal activities.  What is this test: This test is an x-ray of the breast to look for breast disease. The breast is pressed between two plates to flatten and spread the tissue. An X-ray is taken of the breast from different angles.  Who should I call with questions: If you have any questions, please call the Imaging Department where you will have your exam. Directions, parking instructions, and other information is available on our website, [email or web address].  Other information about my exam Three-dimensional (3D) mammograms are available at [facility] locations in [facility], [facility], [place], and [place]. [facility] locations include [place] [facility] in [place].  Benefits of 3D mammograms include: * Improved rate of cancer detection * Decreases your chance of having to go back for more tests, which means fewer: * \"False-positive\" results (This means that there is an abnormal " area but it isn't cancer.) * Invasive testing procedures, such as a biopsy or surgery * Can provide clearer images of the breast if you have dense breast tissue.  *3D mammography is an optional exam that anyone can have with a 2D mammogram. It doesn't replace or take the place of a 2D mammogram. 2D mammograms remain an effective screening test for all women.  Not all insurance companies cover the cost of a 3D mammogram. Check with your insurance.            Feb 07, 2019 11:00 AM CST   LAB with [facility]   [facility] ([facility] )    [address]  [facility] [address]   [phone]            Feb 14, 2019 11:00 AM CST   (Arrive by 10:45 AM)   Return Visit with Matilde Patrick NP   [facility] ([facility] )    [address] [place] [address]   [phone]              Who to contact     If you have questions or need follow up information about today's clinic visit or your schedule please contact [facility] directly at [phone].  Normal or non-critical lab and imaging results will be communicated to you by MyChart, letter or phone within 4 business days after the clinic has received the results. If you do not hear from us within 7 days, please contact the clinic through MyChart or phone. If you have a critical or abnormal lab result, we will notify you by phone as soon as possible.  Submit refill requests through [employer] or call your pharmacy and they will forward the refill request to us. Please allow 3 business days for your refill to be completed.          Additional Information About Your Visit        Care EveryWhere ID     This is your Care EveryWhere ID. This could be used by other organizations to access your [facility] medical records  [id number]        Your Vitals Were     Pulse Temperature Respirations Pulse Oximetry BMI (Body Mass Index)       97 98.7  F (37.1  C) 14  97% 46.32 kg/m2        Blood Pressure from Last 3 Encounters:   10/03/18 140/80   08/15/18 158/80   04/16/18 120/58    Weight from Last 3 Encounters:   10/03/18 287 lb (130.2 kg)   08/15/18 292 lb 3.2 oz (132.5 kg)   04/16/18 296 lb (134.3 kg)              We Performed the Following     XR CHEST 2 VW (Clinic Performed)          Today's Medication Changes          These changes are accurate as of 10/3/18 12:30 PM.  If you have any questions, ask your nurse or doctor.               These medicines have changed or have updated prescriptions.        Dose/Directions    lisinopril 20 MG tablet   Commonly known as:  PRINIVIL/ZESTRIL   This may have changed:    - medication strength  - See the new instructions.   Used for:  Benign essential hypertension   Changed by:  Jackie Hitchcock NP        Dose:  20 mg   Take 1 tablet (20 mg) by mouth daily   Quantity:  90 tablet   Refills:  1            Where to get your medicines      These medications were sent to [facility] [address] AT [address] [address]     Phone:  [phone]     lisinopril 20 MG tablet                Primary Care Provider Office Phone # Fax #    Jackie Hitchcock [phone] [address]  [place] [address]        Equal Access to Services     VALE MERAZ AH: Hadii loly love hadasho Sosrinivasa, waaxda luqadaha, qaybta kaalmada adeegyada, lb peter. So [facility] [phone].    ATENCIÓN: Si habla español, tiene a whiting disposición servicios gratuitos de asistencia lingüística. Zina al [phone].    We comply with applicable federal civil rights laws and [place] laws. We do not discriminate on the basis of race, color, national origin, age, disability, sex, sexual orientation, or gender identity.            Thank you!     Thank you for choosing [facility]  for your care. Our goal is always to  provide you with excellent care. Hearing back from our patients is one way we can continue to improve our services. Please take a few minutes to complete the written survey that you may receive in the mail after your visit with us. Thank you!             Your Updated Medication List - Protect others around you: Learn how to safely use, store and throw away your medicines at www.disposemymeds.org.          This list is accurate as of 10/3/18 12:30 PM.  Always use your most recent med list.                   Brand Name Dispense Instructions for use Diagnosis    * albuterol (2.5 MG/3ML) 0.083% neb solution     1 Box    Take 1 vial (2.5 mg) by nebulization every 4 hours as needed for shortness of breath / dyspnea or wheezing    Mild persistent asthma without complication       * VENTOLIN  (90 Base) MCG/ACT inhaler   Generic drug:  albuterol     18 g    Inhale 2 Puffs into the lungs every four hours as needed for shortness of breath / dyspnea or wheezing    Mild persistent asthma without complication       ASPIRIN PO      Take 81 mg by mouth daily        calcium carbonate 600 mg-vitamin D 400 units 600-400 MG-UNIT per tablet    CALTRATE     Take 1 tablet by mouth 2 times daily        [employer] vitamin  B12 1000 MCG Tabs   Generic drug:  cyanocobalamin     30 tablet    Take 1,000 mcg by mouth daily        DAILY MULTIVITAMIN PO      Take one tablet by oral route every day with food        doxylamine 25 MG Tabs tablet    UNISOM     Take 25 mg by mouth nightly as needed 1-2 at HS as needed        escitalopram 10 MG tablet    LEXAPRO    90 tablet    Take 1 Tab by mouth one time a day.    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, Ductal carcinoma in situ of right breast, Encounter for monitoring tamoxifen therapy, Seasonal affective disorder (H)       IBUPROFEN PO      Take 600 mg by mouth every 6 hours as needed for moderate pain        lisinopril 20 MG tablet    PRINIVIL/ZESTRIL    90 tablet    Take 1 tablet (20 mg) by  mouth daily    Benign essential hypertension       nystatin 718452 UNIT/GM Powd    MYCOSTATIN    60 g    Apply 100,000 g topically 2 times daily as needed    Candidiasis of skin and nails       OMEGA 3 PO      Take 1,000 mg by mouth daily        tamoxifen 20 MG tablet    NOLVADEX    90 tablet    Take 1 Tab by mouth one time a day.    Ductal carcinoma in situ of right breast       TYLENOL PO      Take 325 mg by mouth every 4 hours as needed for mild pain or fever        UNABLE TO FIND      MEDICATION NAME: phedermine 15mg take 1/2 tablet once daily        VITAMIN D (CHOLECALCIFEROL) PO      Take 5,000 Units by mouth daily        * Notice:  This list has 2 medication(s) that are the same as other medications prescribed for you. Read the directions carefully, and ask your doctor or other care provider to review them with you.

## 2018-10-03 NOTE — PROGRESS NOTES
SUBJECTIVE:   Vandana Jaeger is a 58 year old female who presents to clinic today for the following health issues:        Acute Illness   Acute illness concerns: URI  Onset: 5 days    Fever: Unsure, never checked.     Chills/Sweats: YES    Headache (location?): no     Sinus Pressure:no    Conjunctivitis:  no    Ear Pain: no    Rhinorrhea: no     Congestion: YES- chest    Sore Throat: YES     Cough: YES    Wheeze: YES, not typical for her.    Decreased Appetite: YES    Nausea: no     Vomiting: no     Diarrhea:  no     Dysuria/Freq.: No    Fatigue/Achiness: YES- when coughing    Sick/Strep Exposure: No.      Therapies Tried and outcome: Day and nyquil, no help        Hypertension Follow-up    Outpatient blood pressures are not being checked.    Low Salt Diet: no added salt        Depression Followup    Status since last visit: Stable     See PHQ-9 for current symptoms.  Other associated symptoms: None    Complicating factors:   Significant life event:  No   Current substance abuse:  None  Anxiety or Panic symptoms:  No    PHQ-9 SCORE 4/16/2018 8/15/2018 10/3/2018   Total Score - - -   Total Score 4 3 3     REUBEN-7 SCORE 2/20/2018 10/3/2018   Total Score 2 0           Amount of exercise or physical activity: None    Problems taking medications regularly: No    Medication side effects: none    Diet: regular (no restrictions)         Problem list and histories reviewed & adjusted, as indicated.  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     Ductal carcinoma in situ (DCIS) of right breast     Morbid obesity (H)     Reactive airway disease that is not asthma     Contact dermatitis and other eczema, due to unspecified cause     Essential hypertension     Vitamin D deficiency     ACP (advance care planning)     Use of tamoxifen (Nolvadex)     Adjustment disorder with mixed anxiety and depressed mood     Morbid obesity with BMI of  50.0-59.9, adult (H)     Past Surgical History:   Procedure Laterality Date     BIOPSY      right breast--neg     BIOPSY BREAST NEEDLE LOCALIZATION  2014    Procedure: BIOPSY BREAST NEEDLE LOCALIZATION;  WIRE LOCALIZED SEGMENTAL RESECTION, RIGHT BREAST;  Surgeon: Shanika Beaver MD;  Location: [facility]     CHOLECYSTECTOMY       COLONOSCOPY       COLONOSCOPY  2013     hysterectomy with D and C  2017    [facility]     ORTHOPEDIC SURGERY      heel surgery     ORTHOPEDIC SURGERY      rt knee meniiscus tear       Social History   Substance Use Topics     Smoking status: Former Smoker     Packs/day: 0.10     Years: 30.00     Types: Cigarettes     Start date: 8/15/1974     Quit date: 3/3/2013     Smokeless tobacco: Never Used     Alcohol use Yes     Family History   Problem Relation Age of Onset     C.A.D. Father      Coronary Artery Disease Father      Cerebrovascular Disease Father      Prostate Cancer Father      Hypertension Mother      Cerebrovascular Disease Mother      Breast Cancer Mother 55      age 71 left mastectomy     Thyroid Disease Other      Thyroid Disease Sister      Diabetes No family hx of      Asthma No family hx of      Hyperlipidemia No family hx of      Colon Cancer No family hx of      Other Cancer No family hx of      Anesthesia Reaction No family hx of      Genetic Disorder No family hx of          Current Outpatient Prescriptions   Medication Sig Dispense Refill     Acetaminophen (TYLENOL PO) Take 325 mg by mouth every 4 hours as needed for mild pain or fever       albuterol (2.5 MG/3ML) 0.083% nebulizer solution Take 1 vial (2.5 mg) by nebulization every 4 hours as needed for shortness of breath / dyspnea or wheezing 1 Box 3     calcium-vitamin D (CALTRATE) 600-400 MG-UNIT per tablet Take 1 tablet by mouth 2 times daily       cyanocobalamin ([employer]S VITAMIN  B12) 1000 MCG TABS Take 1,000 mcg by mouth daily 30 tablet      doxylamine (UNISOM) 25 MG TABS tablet Take 25 mg by  mouth nightly as needed 1-2 at HS as needed       escitalopram (LEXAPRO) 10 MG tablet Take 1 Tab by mouth one time a day. 90 tablet 1     IBUPROFEN PO Take 600 mg by mouth every 6 hours as needed for moderate pain       lisinopril (PRINIVIL/ZESTRIL) 10 MG tablet Take 1 Tab by mouth one time a day. 90 tablet 1     Multiple Vitamin (DAILY MULTIVITAMIN PO) Take one tablet by oral route every day with food       nystatin (MYCOSTATIN) 732081 UNIT/GM POWD Apply 100,000 g topically 2 times daily as needed 60 g 3     Omega-3 Fatty Acids (OMEGA 3 PO) Take 1,000 mg by mouth daily        tamoxifen (NOLVADEX) 20 MG tablet Take 1 Tab by mouth one time a day. 90 tablet 1     UNABLE TO FIND MEDICATION NAME: phedermine 15mg take 1/2 tablet once daily       VENTOLIN  (90 Base) MCG/ACT Inhaler Inhale 2 Puffs into the lungs every four hours as needed for shortness of breath / dyspnea or wheezing 18 g 1     VITAMIN D, CHOLECALCIFEROL, PO Take 5,000 Units by mouth daily        ASPIRIN PO Take 81 mg by mouth daily       Allergies   Allergen Reactions     Adhesive Tape      Steri strips left scar.     Latex      Possible allergy.     Recent Labs   Lab Test  08/08/18   1540 06/16/18 02/20/18   1107  02/14/18   1638  08/01/17   1542   02/05/16   0802   03/02/15   1548   LDL   --    --   99   --    --    --    --    --   80   HDL   --    --   50   --    --    --    --    --   45*   TRIG   --    --   113   --    --    --    --    --   175*   ALT  31   --    --   31  31   < >   --    < >  30   CR  0.72  0.71   --   0.81  0.64   < >  0.55   < >  0.61   GFRESTIMATED  83   --    --   72  >90  Non  GFR Calc     < >  >90  Non  GFR Calc     < >  >90  Non  GFR Calc     GFRESTBLACK  >90   --    --   87  >90   GFR Calc     < >  >90   GFR Calc     < >  >90   GFR Calc     POTASSIUM  4.0  3.9   --   3.9  4.1   < >  3.8   < >  3.9   TSH   --    --   0.95    --    --    --   1.32   --    --     < > = values in this interval not displayed.      BP Readings from Last 3 Encounters:   10/03/18 140/80   08/15/18 158/80   04/16/18 120/58    Wt Readings from Last 3 Encounters:   10/03/18 287 lb (130.2 kg)   08/15/18 292 lb 3.2 oz (132.5 kg)   04/16/18 296 lb (134.3 kg)                  Labs reviewed in EPIC    Reviewed and updated as needed this visit by clinical staff  Tobacco  Allergies  Meds  Med Hx  Surg Hx  Fam Hx  Soc Hx      Reviewed and updated as needed this visit by Provider         ROS:  CONSTITUTIONAL:As in HPI  INTEGUMENTARY/SKIN: NEGATIVE for worrisome rashes, moles or lesions  EYES: As in HPI  ENT/MOUTH: As in HPI  RESP:As in HPI  CV: NEGATIVE for chest pain, palpitations or peripheral edema  GI: NEGATIVE for nausea, abdominal pain, heartburn, or change in bowel habits  MUSCULOSKELETAL: NEGATIVE for significant arthralgias or myalgia  NEURO: NEGATIVE for weakness, dizziness or paresthesias  ENDOCRINE: NEGATIVE for temperature intolerance, skin/hair changes  HEME/ALLERGY/IMMUNE: NEGATIVE for bleeding problems  PSYCHIATRIC: NEGATIVE for changes in mood or affect    OBJECTIVE:     /80 (BP Location: Right arm, Patient Position: Sitting, Cuff Size: Adult Large)  Pulse 97  Temp 98.7  F (37.1  C)  Resp 14  Wt 287 lb (130.2 kg)  SpO2 97%  BMI 46.32 kg/m2  Body mass index is 46.32 kg/(m^2).     GENERAL: healthy, alert and no distress  EYES: Eyes grossly normal to inspection, PERRL and conjunctivae and sclerae normal  HENT: ear canals and TM's normal, nose and mouth without ulcers or lesions  NECK: no adenopathy, no asymmetry, masses, or scars and thyroid normal to palpation  RESP: lungs clear to auscultation - Scattered wheezes in bases which change with cough  CV: regular rate and rhythm, normal S1 S2, no S3 or S4, no murmur, click or rub, no peripheral edema and peripheral pulses strong  SKIN: no suspicious lesions or rashes    Diagnostic Test  Results:  Results for orders placed or performed in visit on 10/03/18 (from the past 24 hour(s))   XR CHEST 2 VW (Clinic Performed)    Narrative    PROCEDURE:  XR CHEST 2 VW    HISTORY:  ; Chest congestion.     COMPARISON:  2015    FINDINGS:   The cardiac silhouette is normal in size. The pulmonary vasculature is  normal.  The lungs are clear. No pleural effusion or pneumothorax.      Impression    IMPRESSION:  No acute cardiopulmonary disease.      RHONDA LINARES MD       ASSESSMENT/PLAN:     Hypertension; controlled   Associated with the following complications:    None   Plan:  No changes in the patient's current treatment plan    Depression; recurrent episode-- Mild   Associated with the following complications:    None   Plan:  No changes in the patient's current treatment plan          1. Chest congestion  - XR CHEST 2 VW (Clinic Performed)    2. Acute bronchitis, unspecified organism  Use nebs and inhaler as needed  Fluids  Rest  Humidity at home - add bacteriostatic solution to humidifier  OTC Mucinex liquid cough and cold  Add Zicam or other zinc daily until you feel better  Please go to the ER or UC if your symptoms worsen   Please return to clinic if unimproved  Zithromax Rx given to fill if symptoms increase    3. Benign essential hypertension - Goal BP is 120's/70's  - lisinopril (PRINIVIL/ZESTRIL) 20 MG tablet; Take 1 tablet (20 mg) by mouth daily  Dispense: 90 tablet; Refill: 1 - dose increase  - BP recheck 2 weeks, please call my nurse with reading    4. Moderate episode of recurrent major depressive disorder (H)  - Continue plan of care      Follow-up 3 months - am fasting      Malina Blanco RN  Student Family Nurse Practitioner      Jackie Hitchcock NP  [facility]

## 2018-10-03 NOTE — NURSING NOTE
"Chief Complaint   Patient presents with     URI       Initial /80 (BP Location: Right arm, Patient Position: Sitting, Cuff Size: Adult Large)  Pulse 97  Temp 98.7  F (37.1  C)  Resp 14  Wt 287 lb (130.2 kg)  SpO2 97%  BMI 46.32 kg/m2 Estimated body mass index is 46.32 kg/(m^2) as calculated from the following:    Height as of 8/15/18: 5' 6\" (1.676 m).    Weight as of this encounter: 287 lb (130.2 kg).  Medication Reconciliation: complete    Lidia Rodas LPN    "

## 2018-10-03 NOTE — LETTER
[facility] [place] [facility]  [address]  [place] [address]  Phone: [phone]    October 3, 2018        Vandana Jaeger  [address] [place] [address]          To whom it may concern:    RE: Vandana Jaeger    Please excuse from work 10/1/18, 10/3/18, 10/4/18, and 10/5/18.    Please contact me for questions or concerns.      Sincerely,        Jackie Hitchcock NP

## 2018-10-03 NOTE — PATIENT INSTRUCTIONS
Results for orders placed or performed in visit on 10/03/18 (from the past 24 hour(s))   XR CHEST 2 VW (Clinic Performed)    Narrative    PROCEDURE:  XR CHEST 2 VW    HISTORY:  ; Chest congestion.     COMPARISON:  2015    FINDINGS:   The cardiac silhouette is normal in size. The pulmonary vasculature is  normal.  The lungs are clear. No pleural effusion or pneumothorax.      Impression    IMPRESSION:  No acute cardiopulmonary disease.      RHONDA LINARES MD       1. Chest congestion  - XR CHEST 2 VW (Clinic Performed)    2. Acute bronchitis, unspecified organism  Use nebs and inhaler as needed  Fluids  Rest  Humidity at home - add bacteriostatic solution to humidifier  OTC Mucinex liquid cough and cold  Add Zicam or other zinc daily until you feel better  Please go to the ER or UC if your symptoms worsen   Please return to clinic if unimproved  Zithromax Rx given to fill if symptoms increase    3. Benign essential hypertension - Goal BP is 120's/70's  - lisinopril (PRINIVIL/ZESTRIL) 20 MG tablet; Take 1 tablet (20 mg) by mouth daily  Dispense: 90 tablet; Refill: 1 - dose increase  - BP recheck 2 weeks, please call my nurse with reading    4. Moderate episode of recurrent major depressive disorder (H)  - Continue plan of care      Follow-up 3 months - am fasting      Malina Blanco, RN  Student Family Nurse Practitioner      Jackie Hitchcock NP  [facility]

## 2018-10-03 NOTE — LETTER
My Depression Action Plan  Name: Vandana Jaeger   Date of Birth 1960  Date: 10/3/2018    My doctor: Jackie Hitchcock   My clinic: [facility]  [address]  [place] [address]  [phone]          GREEN    ZONE   Good Control    What it looks like:     Things are going generally well. You have normal up s and down s. You may even feel depressed from time to time, but bad moods usually last less than a day.   What you need to do:  1. Continue to care for yourself (see self care plan)  2. Check your depression survival kit and update it as needed  3. Follow your physician s recommendations including any medication.  4. Do not stop taking medication unless you consult with your physician first.           YELLOW         ZONE Getting Worse    What it looks like:     Depression is starting to interfere with your life.     It may be hard to get out of bed; you may be starting to isolate yourself from others.    Symptoms of depression are starting to last most all day and this has happened for several days.     You may have suicidal thoughts but they are not constant.   What you need to do:     1. Call your care team, your response to treatment will improve if you keep your care team informed of your progress. Yellow periods are signs an adjustment may need to be made.     2. Continue your self-care, even if you have to fake it!    3. Talk to someone in your support network    4. Open up your depression survival kit           RED    ZONE Medical Alert - Get Help    What it looks like:     Depression is seriously interfering with your life.     You may experience these or other symptoms: You can t get out of bed most days, can t work or engage in other necessary activities, you have trouble taking care of basic hygiene, or basic responsibilities, thoughts of suicide or death that will not go away, self-injurious behavior.     What you need to do:  1. Call your care team and  request a same-day appointment. If they are not available (weekends or after hours) call your local crisis line, emergency room or 911.            Depression Self Care Plan / Survival Kit    Self-Care for Depression  Here s the deal. Your body and mind are really not as separate as most people think.  What you do and think affects how you feel and how you feel influences what you do and think. This means if you do things that people who feel good do, it will help you feel better.  Sometimes this is all it takes.  There is also a place for medication and therapy depending on how severe your depression is, so be sure to consult with your medical provider and/ or Behavioral Health Consultant if your symptoms are worsening or not improving.     In order to better manage my stress, I will:    Exercise  Get some form of exercise, every day. This will help reduce pain and release endorphins, the  feel good  chemicals in your brain. This is almost as good as taking antidepressants!  This is not the same as joining a gym and then never going! (they count on that by the way ) It can be as simple as just going for a walk or doing some gardening, anything that will get you moving.      Hygiene   Maintain good hygiene (Get out of bed in the morning, Make your bed, Brush your teeth, Take a shower, and Get dressed like you were going to work, even if you are unemployed).  If your clothes don't fit try to get ones that do.    Diet  I will strive to eat foods that are good for me, drink plenty of water, and avoid excessive sugar, caffeine, alcohol, and other mood-altering substances.  Some foods that are helpful in depression are: complex carbohydrates, B vitamins, flaxseed, fish or fish oil, fresh fruits and vegetables.    Psychotherapy  I agree to participate in Individual Therapy (if recommended).    Medication  If prescribed medications, I agree to take them.  Missing doses can result in serious side effects.  I understand that  drinking alcohol, or other illicit drug use, may cause potential side effects.  I will not stop my medication abruptly without first discussing it with my provider.    Staying Connected With Others  I will stay in touch with my friends, family members, and my primary care provider/team.    Use your imagination  Be creative.  We all have a creative side; it doesn t matter if it s oil painting, sand castles, or mud pies! This will also kick up the endorphins.    Witness Beauty  (AKA stop and smell the roses) Take a look outside, even in mid-winter. Notice colors, textures. Watch the squirrels and birds.     Service to others  Be of service to others.  There is always someone else in need.  By helping others we can  get out of ourselves  and remember the really important things.  This also provides opportunities for practicing all the other parts of the program.    Humor  Laugh and be silly!  Adjust your TV habits for less news and crime-drama and more comedy.    Control your stress  Try breathing deep, massage therapy, biofeedback, and meditation. Find time to relax each day.     My support system    Clinic Contact:  Phone number:    Contact 1:  Phone number:    Contact 2:  Phone number:    Evangel[employer]/:  Phone number:    Therapist:  Phone number:    Local crisis center:    Phone number:    Other community support:  Phone number:

## 2018-10-04 ASSESSMENT — PATIENT HEALTH QUESTIONNAIRE - PHQ9: SUM OF ALL RESPONSES TO PHQ QUESTIONS 1-9: 3

## 2018-10-04 ASSESSMENT — ANXIETY QUESTIONNAIRES: GAD7 TOTAL SCORE: 0

## 2019-01-18 ENCOUNTER — ANCILLARY PROCEDURE (OUTPATIENT)
Dept: MAMMOGRAPHY | Facility: OTHER | Age: 59
End: 2019-01-18
Attending: NURSE PRACTITIONER
Payer: COMMERCIAL

## 2019-01-18 DIAGNOSIS — D05.11 DUCTAL CARCINOMA IN SITU OF RIGHT BREAST: ICD-10-CM

## 2019-01-18 DIAGNOSIS — Z17.0 MALIGNANT NEOPLASM OF RIGHT BREAST IN FEMALE, ESTROGEN RECEPTOR POSITIVE, UNSPECIFIED SITE OF BREAST (H): ICD-10-CM

## 2019-01-18 DIAGNOSIS — Z12.31 ENCOUNTER FOR SCREENING MAMMOGRAM FOR BREAST CANCER: ICD-10-CM

## 2019-01-18 DIAGNOSIS — C50.911 MALIGNANT NEOPLASM OF RIGHT BREAST IN FEMALE, ESTROGEN RECEPTOR POSITIVE, UNSPECIFIED SITE OF BREAST (H): ICD-10-CM

## 2019-01-18 PROCEDURE — 77067 SCR MAMMO BI INCL CAD: CPT | Mod: TC

## 2019-01-18 PROCEDURE — 77063 BREAST TOMOSYNTHESIS BI: CPT | Mod: TC

## 2019-02-06 DIAGNOSIS — Z17.0 MALIGNANT NEOPLASM OF RIGHT BREAST IN FEMALE, ESTROGEN RECEPTOR POSITIVE, UNSPECIFIED SITE OF BREAST (H): ICD-10-CM

## 2019-02-06 DIAGNOSIS — C50.911 MALIGNANT NEOPLASM OF RIGHT BREAST IN FEMALE, ESTROGEN RECEPTOR POSITIVE, UNSPECIFIED SITE OF BREAST (H): ICD-10-CM

## 2019-02-06 DIAGNOSIS — Z12.31 ENCOUNTER FOR SCREENING MAMMOGRAM FOR BREAST CANCER: ICD-10-CM

## 2019-02-06 DIAGNOSIS — D05.11 DUCTAL CARCINOMA IN SITU OF RIGHT BREAST: ICD-10-CM

## 2019-02-06 LAB
BASOPHILS # BLD AUTO: 0 10E9/L (ref 0–0.2)
BASOPHILS NFR BLD AUTO: 0.3 %
DIFFERENTIAL METHOD BLD: NORMAL
EOSINOPHIL # BLD AUTO: 0.1 10E9/L (ref 0–0.7)
EOSINOPHIL NFR BLD AUTO: 1.1 %
ERYTHROCYTE [DISTWIDTH] IN BLOOD BY AUTOMATED COUNT: 13.1 % (ref 10–15)
HCT VFR BLD AUTO: 40.9 % (ref 35–47)
HGB BLD-MCNC: 13 G/DL (ref 11.7–15.7)
LYMPHOCYTES # BLD AUTO: 2.4 10E9/L (ref 0.8–5.3)
LYMPHOCYTES NFR BLD AUTO: 38.6 %
MCH RBC QN AUTO: 29.3 PG (ref 26.5–33)
MCHC RBC AUTO-ENTMCNC: 31.8 G/DL (ref 31.5–36.5)
MCV RBC AUTO: 92 FL (ref 78–100)
MONOCYTES # BLD AUTO: 0.5 10E9/L (ref 0–1.3)
MONOCYTES NFR BLD AUTO: 7.8 %
NEUTROPHILS # BLD AUTO: 3.3 10E9/L (ref 1.6–8.3)
NEUTROPHILS NFR BLD AUTO: 52.2 %
PLATELET # BLD AUTO: 288 10E9/L (ref 150–450)
RBC # BLD AUTO: 4.44 10E12/L (ref 3.8–5.2)
WBC # BLD AUTO: 6.3 10E9/L (ref 4–11)

## 2019-02-06 PROCEDURE — 99000 SPECIMEN HANDLING OFFICE-LAB: CPT | Performed by: NURSE PRACTITIONER

## 2019-02-06 PROCEDURE — 36415 COLL VENOUS BLD VENIPUNCTURE: CPT | Performed by: NURSE PRACTITIONER

## 2019-02-06 PROCEDURE — 86300 IMMUNOASSAY TUMOR CA 15-3: CPT | Mod: 90 | Performed by: NURSE PRACTITIONER

## 2019-02-06 PROCEDURE — 80053 COMPREHEN METABOLIC PANEL: CPT | Performed by: NURSE PRACTITIONER

## 2019-02-06 PROCEDURE — 85025 COMPLETE CBC W/AUTO DIFF WBC: CPT | Performed by: NURSE PRACTITIONER

## 2019-02-07 LAB
ALBUMIN SERPL-MCNC: 4.1 G/DL (ref 3.4–5)
ALP SERPL-CCNC: 47 U/L (ref 40–150)
ALT SERPL W P-5'-P-CCNC: 30 U/L (ref 0–50)
ANION GAP SERPL CALCULATED.3IONS-SCNC: 7 MMOL/L (ref 3–14)
AST SERPL W P-5'-P-CCNC: 17 U/L (ref 0–45)
BILIRUB SERPL-MCNC: 0.3 MG/DL (ref 0.2–1.3)
BUN SERPL-MCNC: 26 MG/DL (ref 7–30)
CALCIUM SERPL-MCNC: 9 MG/DL (ref 8.5–10.1)
CANCER AG27-29 SERPL-ACNC: 20 U/ML (ref 0–39)
CHLORIDE SERPL-SCNC: 106 MMOL/L (ref 94–109)
CO2 SERPL-SCNC: 26 MMOL/L (ref 20–32)
CREAT SERPL-MCNC: 0.56 MG/DL (ref 0.52–1.04)
GFR SERPL CREATININE-BSD FRML MDRD: >90 ML/MIN/{1.73_M2}
GLUCOSE SERPL-MCNC: 112 MG/DL (ref 70–99)
POTASSIUM SERPL-SCNC: 3.6 MMOL/L (ref 3.4–5.3)
PROT SERPL-MCNC: 7.8 G/DL (ref 6.8–8.8)
SODIUM SERPL-SCNC: 139 MMOL/L (ref 133–144)

## 2019-02-14 ENCOUNTER — ONCOLOGY VISIT (OUTPATIENT)
Dept: ONCOLOGY | Facility: OTHER | Age: 59
End: 2019-02-14
Attending: NURSE PRACTITIONER
Payer: COMMERCIAL

## 2019-02-14 VITALS
BODY MASS INDEX: 44.39 KG/M2 | RESPIRATION RATE: 20 BRPM | TEMPERATURE: 97.9 F | OXYGEN SATURATION: 97 % | DIASTOLIC BLOOD PRESSURE: 78 MMHG | HEART RATE: 109 BPM | HEIGHT: 66 IN | WEIGHT: 276.2 LBS | SYSTOLIC BLOOD PRESSURE: 142 MMHG

## 2019-02-14 DIAGNOSIS — Z79.810 ENCOUNTER FOR MONITORING TAMOXIFEN THERAPY: ICD-10-CM

## 2019-02-14 DIAGNOSIS — F33.8 SEASONAL AFFECTIVE DISORDER (H): ICD-10-CM

## 2019-02-14 DIAGNOSIS — D05.11 DUCTAL CARCINOMA IN SITU OF RIGHT BREAST: ICD-10-CM

## 2019-02-14 DIAGNOSIS — D05.01 LOBULAR CARCINOMA IN SITU OF RIGHT BREAST: Primary | ICD-10-CM

## 2019-02-14 DIAGNOSIS — Z51.81 ENCOUNTER FOR MONITORING TAMOXIFEN THERAPY: ICD-10-CM

## 2019-02-14 PROCEDURE — 99214 OFFICE O/P EST MOD 30 MIN: CPT | Performed by: NURSE PRACTITIONER

## 2019-02-14 RX ORDER — ESCITALOPRAM OXALATE 20 MG/1
20 TABLET ORAL DAILY
Qty: 90 TABLET | Refills: 3 | Status: SHIPPED | OUTPATIENT
Start: 2019-02-14 | End: 2020-02-21

## 2019-02-14 RX ORDER — SENNOSIDES 8.6 MG
650 CAPSULE ORAL EVERY 8 HOURS PRN
COMMUNITY

## 2019-02-14 ASSESSMENT — MIFFLIN-ST. JEOR: SCORE: 1844.58

## 2019-02-14 ASSESSMENT — PAIN SCALES - GENERAL: PAINLEVEL: NO PAIN (0)

## 2019-02-14 ASSESSMENT — PATIENT HEALTH QUESTIONNAIRE - PHQ9: SUM OF ALL RESPONSES TO PHQ QUESTIONS 1-9: 4

## 2019-02-14 NOTE — NURSING NOTE
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    Weight as of this encounter: 125.3 kg (276 lb 3.2 oz).  Medication Reconciliation: complete.  Immunizations reviewed, has info on advanced directives encouraged to fill out, pain = 0, PHQ9 = 4.    Darling Bain LPN    "

## 2019-02-14 NOTE — PROGRESS NOTES
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 Patient was diagnosed with DCIS and LCIS in January 2014 and had a lumpectomy performed in February 2014. She was placed on tamoxifen 20 mg daily in March of 2014.      She presents to the clinic today with no new complaints.  She is following with a weight loss management provider and has lost approximately 26 pounds.  She does have occasional hot flashes.  She has not followed up for a GYN exam as recommended.  She has had no vaginal bleeding.  She is due to come off of the tamoxifen at the end of March.  She states she does think she is having a little more difficulty from an anxiety and depression standpoint and knows this is a tough time a year for her.  She is currently on Lexapro 10 mg daily and questions increasing this or going on a different medication.    Oncologic History:     Vandana had undergone an abnormal mammogram in November 2013 which showed microcalcifications suspicious for malignancy. She subsequently underwent stereotactic biopsy on November 21, 2013 with original interpretation of histopathology being that of hyperplasia without atypia. This was then over read as part of routine protocol in January 2014. 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 She was evaluated by Dr. Shanika Beaver in Gen. Surgery on January 28, 2014 and bilateral MRI of the breast was obtained which revealed oval-shaped nodular densities in both breasts consistent with inframammary lymph nodes.       The patient was seen by Dr. Chávez of radiation oncology on 1/31/2014 and his impression was that the patient had DCIS and would require definitive  surgery.      The patient underwent excisional breast biopsy on 2/13/2014 and pathology showed LCIS, multifocal, completely excised and low-grade, grade 1, DCIS which was completely excised. Tumor size was at least 0.5 cm DCIS grade 1; margins were uninvolved by DCIS. Estrogen receptor was positive at 90% and progesterone receptor was positive at 50%. Patient was staged pathologic Tis associated with LCIS. She was started on tamoxifen.      Current Chemo Regime/TX: Tamoxifen 20 mg daily started March 2014  Current Cycle: n/a  # of completed cycles: n/a      Previous treatment:  n/a    Past Medical History:   Diagnosis Date     Anxiety state, unspecified      Benign essential hypertension 11/24/2015     Contact dermatitis and other eczema, due to unspecified cause      Ductal carcinoma in situ of right breast 1/28/2014     Ductal carcinoma in situ of right breast 1/28/2014     H/O herpes zoster      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 1/28/2014     Morbid obesity (H) 1/28/2014     Other B-complex deficiencies 8/19/2014     Other specified disease of hair and hair follicles      Primary central sleep apnea 1/26/2016     Reactive airway disease that is not asthma      Problem list name updated by automated process. Provider to review     Unspecified immunity deficiency      Vitamin D deficiency 1/26/2016       Past Surgical History:   Procedure Laterality Date     BIOPSY      right breast--neg     BIOPSY BREAST NEEDLE LOCALIZATION  2/13/2014    Procedure: BIOPSY BREAST NEEDLE LOCALIZATION;  WIRE LOCALIZED SEGMENTAL RESECTION, RIGHT BREAST;  Surgeon: Shanika Beaver MD;  Location: [facility]     CHOLECYSTECTOMY  2010     COLONOSCOPY  2009     COLONOSCOPY  11/12/2013     hysterectomy with D and C  03/2017    [facility]     ORTHOPEDIC SURGERY      heel surgery     ORTHOPEDIC SURGERY      rt knee meniiscus tear       Family History   Problem Relation Age of Onset     C.A.D. Father      Coronary Artery Disease Father       Cerebrovascular Disease Father      Prostate Cancer Father      Hypertension Mother      Cerebrovascular Disease Mother      Breast Cancer Mother 55         age 71 left mastectomy     Thyroid Disease Other      Thyroid Disease Sister      Diabetes No family hx of      Asthma No family hx of      Hyperlipidemia No family hx of      Colon Cancer No family hx of      Other Cancer No family hx of      Anesthesia Reaction No family hx of      Genetic Disorder No family hx of        Social History     Socioeconomic History     Marital status: Single     Spouse name: Not on file     Number of children: Not on file     Years of education: Not on file     Highest education level: Not on file   Social Needs     Financial resource strain: Not on file     Food insecurity - worry: Not on file     Food insecurity - inability: Not on file     Transportation needs - medical: Not on file     Transportation needs - non-medical: Not on file   Occupational History     Not on file   Tobacco Use     Smoking status: Former Smoker     Packs/day: 0.10     Years: 30.00     Pack years: 3.00     Types: Cigarettes     Start date: 8/15/1974     Last attempt to quit: 3/3/2013     Years since quittin.9     Smokeless tobacco: Never Used   Substance and Sexual Activity     Alcohol use: Yes     Drug use: No     Sexual activity: Yes     Partners: Male   Other Topics Concern     Parent/sibling w/ CABG, MI or angioplasty before 65F 55M? No   Social History Narrative     Not on file       Current Outpatient Medications   Medication     Acetaminophen (TYLENOL PO)     albuterol (2.5 MG/3ML) 0.083% neb solution     ASPIRIN PO     calcium-vitamin D (CALTRATE) 600-400 MG-UNIT per tablet     cyanocobalamin ([employer] VITAMIN  B12) 1000 MCG TABS     doxylamine (UNISOM) 25 MG TABS tablet     escitalopram (LEXAPRO) 10 MG tablet     IBUPROFEN PO     lisinopril (PRINIVIL/ZESTRIL) 20 MG tablet     Multiple Vitamin (DAILY MULTIVITAMIN PO)     nystatin (MYCOSTATIN)  "548414 UNIT/GM POWD     Omega-3 Fatty Acids (OMEGA 3 PO)     tamoxifen (NOLVADEX) 20 MG tablet     UNABLE TO FIND     VENTOLIN  (90 Base) MCG/ACT Inhaler     VITAMIN D, CHOLECALCIFEROL, PO     No current facility-administered medications for this visit.         Allergies   Allergen Reactions     Adhesive Tape      Steri strips left scar.     Latex      Possible allergy.       Review Of Systems:  Constitutional:    denies fever, weight changes, chills, and night sweats.  Eyes:    denies blurred or double vision  Ears/Nose/Throat:   denies ear pain, nose problems, difficulty swallowing  Respiratory:   denies shortness of breath, cough   Skin:   denies rash, lesions  Breast/Chest wall:   denies pain, lumps or discharge  Cardiovascular:   denies chest pain, palpitations, edema  Gastrointestinal:   denies abdominal pain, bloating, nausea, early satiety; no change in bowel habits and no blood in her stool  Genitourinary:   denies difficulty with urination, blood in urine  Musculoskeletal:    denies new muscle pain, bone pain  Neurologic:   denies lightheadedness, headaches, numbness or tingling  Psychiatric:   See HPI  Hematologic/Lymphatic/Immunologic:   denies easy bruising, easy bleeding, lumps or bumps noted  Endocrine:   Denies increased thirst; gets hot flashes but tolerable      Physical Exam:  /78   Pulse 109   Temp 97.9  F (36.6  C) (Tympanic)   Resp 20   Ht 1.676 m (5' 6\")   Wt 125.3 kg (276 lb 3.2 oz)   SpO2 97%   BMI 44.58 kg/m      GENERAL APPEARANCE: Healthy, alert and in no acute distress.  HEENT: Normocephalic, Sclerae anicteric. Oropharynx without ulcers, lesions, or thrush.  NECK:   No asymmetry or masses, no thyromegaly.  LYMPHATICS: No palpable cervical, supraclavicular, axillary, or inguinal nodes   RESP: Lungs clear to auscultation bilaterally, respirations regular and easy  CARDIOVASCULAR: Regular rate and rhythm. Normal S1, S2; no murmur, gallop, or rub.  ABDOMEN: Soft, " nontender. Bowel sounds auscultated all 4 quadrants. No palpable organomegaly or masses.  BREAST/Chest wall: Large, pendulous, no lumps, masses or tenderness bilaterally  MUSCULOSKELETAL: Extremities without gross deformities noted. No edema of bilateral lower extremities.  SKIN: No suspicious lesions or rashes.  Area of redness to beneath the left breast in the area where the breast hangs  NEURO: Alert and oriented x 3.  Gait steady.  PSYCHIATRIC: Mentation and affect appear normal.  Mood appropriate.    Laboratory:  Results for orders placed or performed in visit on 02/06/19   Ca27.29  breast tumor marker   Result Value Ref Range    CA 27-29 20 0 - 39 U/mL   Comprehensive metabolic panel   Result Value Ref Range    Sodium 139 133 - 144 mmol/L    Potassium 3.6 3.4 - 5.3 mmol/L    Chloride 106 94 - 109 mmol/L    Carbon Dioxide 26 20 - 32 mmol/L    Anion Gap 7 3 - 14 mmol/L    Glucose 112 (H) 70 - 99 mg/dL    Urea Nitrogen 26 7 - 30 mg/dL    Creatinine 0.56 0.52 - 1.04 mg/dL    GFR Estimate >90 >60 mL/min/[1.73_m2]    GFR Estimate If Black >90 >60 mL/min/[1.73_m2]    Calcium 9.0 8.5 - 10.1 mg/dL    Bilirubin Total 0.3 0.2 - 1.3 mg/dL    Albumin 4.1 3.4 - 5.0 g/dL    Protein Total 7.8 6.8 - 8.8 g/dL    Alkaline Phosphatase 47 40 - 150 U/L    ALT 30 0 - 50 U/L    AST 17 0 - 45 U/L   CBC with platelets differential   Result Value Ref Range    WBC 6.3 4.0 - 11.0 10e9/L    RBC Count 4.44 3.8 - 5.2 10e12/L    Hemoglobin 13.0 11.7 - 15.7 g/dL    Hematocrit 40.9 35.0 - 47.0 %    MCV 92 78 - 100 fl    MCH 29.3 26.5 - 33.0 pg    MCHC 31.8 31.5 - 36.5 g/dL    RDW 13.1 10.0 - 15.0 %    Platelet Count 288 150 - 450 10e9/L    % Neutrophils 52.2 %    % Lymphocytes 38.6 %    % Monocytes 7.8 %    % Eosinophils 1.1 %    % Basophils 0.3 %    Absolute Neutrophil 3.3 1.6 - 8.3 10e9/L    Absolute Lymphocytes 2.4 0.8 - 5.3 10e9/L    Absolute Monocytes 0.5 0.0 - 1.3 10e9/L    Absolute Eosinophils 0.1 0.0 - 0.7 10e9/L    Absolute Basophils  0.0 0.0 - 0.2 10e9/L    Diff Method Automated Method        Imaging Studies:      EXAM: MA SCREENING BILATERAL W/ VIKA, 1/18/2019 1:20 PM     COMPARISONS: December 2017     HISTORY: screening mammogram/h/o right DCIS; Ductal carcinoma in situ  of right breast; Encounter for screening mammogram for breast cancer;  Malignant neoplasm of right breast in female, estrogen receptor  positive, unspecified site of breast (H); Malignant neoplasm of right  breast in female, estrogen receptor positive, unspecified site of  breast (H)     FINDINGS: Postoperative changes are seen in the right breast. No new  dominant masses or malignant calcifications are noted     BREAST DENSITY: Scattered fibroglandular densities.                                                                      IMPRESSION: BI-RADS CATEGORY: 2 - Benign.     RECOMMENDED FOLLOW-UP: Annual Mammography.        The images were reviewed by R2 computer aided detection.     RHONDA LINARES MD      ASSESSMENT/PLAN:    #1 Breast cancer: Ductal carcinoma in situ of the right breast and lobular carcinoma in situ of the right breast diagnosed January 2014. Patient underwent lumpectomy. She was then placed on tamoxifen 20mg daily and patient continues on this to date.  She will discontinue the tamoxifen April 1 as she will had 5 years of therapy.  We will follow up in 6 months with a CBC, CMP. Patient will have her screening mammogram in December 2018.      #2 Use of Tamoxifen:   Once again I recommended a yearly GYN exam.  She will discontinue the tamoxifen April 1.    #3 Seasonal affective disorder: I recommended she increase her Lexapro to 20 mg daily.  I asked that she give this about 6 weeks to see if this helps and if it does not, I recommended she follow-up with her PCP for further recommendations.  Prescription for the Lexapro was sent to her pharmacy.     I encouraged patient to call with any questions or concerns.     Approximately 25 minutes spent with the  patient, with >75% of this time spent in counseling and discussion of her breast cancer diagnosis and the plan for continued surveillance.  We again discussed the side effects of the tamoxifen including  small risk of uterine cancer and the importance of yearly GYN exam.  We also discussed the importance of  seeking medical attention with any vaginal discharge.  Patient was again commended on her continued weight loss and encouraged to continue.    Matilde GALVEZ, FNP-BC, AOCNP

## 2019-02-14 NOTE — PATIENT INSTRUCTIONS
We would like to see you back in 6 months. Please come 30 minutes prior for lab work.    Your prescription for lexapro (escitalopram) has been sent to: [facility] in [place]     When you are in need of a refill, please call your pharmacy and they will send us a request.     If you have any questions please call [phone]    Other instructions:      STOP the Tamoxifen April 1st 2019.  Make an appointment for a GYN exam.

## 2019-04-24 DIAGNOSIS — I10 BENIGN ESSENTIAL HYPERTENSION: ICD-10-CM

## 2019-04-24 NOTE — TELEPHONE ENCOUNTER
Lisinopril      Last Written Prescription Date:  10/3/18  Last Fill Quantity: 90,   # refills: 1  Last Office Visit: 10/03/18  Future Office visit:

## 2019-04-25 RX ORDER — LISINOPRIL 20 MG/1
20 TABLET ORAL DAILY
Qty: 30 TABLET | Refills: 0 | Status: SHIPPED | OUTPATIENT
Start: 2019-04-25 | End: 2019-05-21

## 2019-04-25 NOTE — TELEPHONE ENCOUNTER
Vital Signs 2/21/2018 3/5/2018 4/16/2018 4/16/2018 8/15/2018   Systolic 145 124  120    Diastolic 73 70  58    Pulse 87 64  80    Temperature 97.6   98.7      Vital Signs 8/15/2018 8/15/2018 10/3/2018 2/14/2019 2/14/2019   Systolic 160 158 140  142   Diastolic 84 80 80  78   Pulse 101  97  109   Temperature 98.4  98.7  97.9     Please note BP's.Thank you.

## 2019-05-16 NOTE — PROGRESS NOTES
SUBJECTIVE:   Vandana Jaeger is a 59 year old female who presents to clinic today for the following   health issues:      Hypertension Follow-up    Outpatient blood pressures are not being checked.    Low Salt Diet: low salt        Acute Illness   Acute illness concerns: ear issue  Onset: few weeks    Fever: no     Chills/Sweats: no     Headache (location?): no     Sinus Pressure:no    Conjunctivitis:  no    Ear Pain: YES: left    Rhinorrhea: no     Congestion: no     Sore Throat: no      Cough: no    Wheeze: no     Decreased Appetite: no     Nausea: no     Vomiting: no     Diarrhea:  no     Dysuria/Freq.: no     Fatigue/Achiness: no     Sick/Strep Exposure: no      Therapies Tried and outcome: ear oil         Joint Pain    Onset: 1 1/2 months     Description:   Location: right knee  Character: Cramping    Intensity: moderate, severe    Progression of Symptoms: worse    Accompanying Signs & Symptoms:  Other symptoms: radiation of pain to calf and behind knee    History:   Previous similar pain: no       Precipitating factors:   Trauma or overuse: no     Alleviating factors:  Improved by: heat, ice, stretching and support wrap    Therapies Tried and outcome: heat ice creams stretching wraps nothing helps           Amount of exercise or physical activity: 6-7 days/week for an average of less than 15 minutes    Problems taking medications regularly: No    Medication side effects: none    Diet: regular (no restrictions)      Additional history: as documented      Reviewed  and updated as needed this visit by clinical staff  Tobacco  Allergies  Meds  Problems  Med Hx  Surg Hx  Fam Hx         Reviewed and updated as needed this visit by Provider         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     Ductal carcinoma in situ (DCIS) of right breast     Morbid obesity (H)     Reactive airway disease that is not asthma     Contact dermatitis and other eczema, due to  unspecified cause     Essential hypertension     Vitamin D deficiency     ACP (advance care planning)     Use of tamoxifen (Nolvadex)     Morbid obesity with BMI of 50.0-59.9, adult (H)     Past Surgical History:   Procedure Laterality Date     BIOPSY      right breast--neg     BIOPSY BREAST NEEDLE LOCALIZATION  2014    Procedure: BIOPSY BREAST NEEDLE LOCALIZATION;  WIRE LOCALIZED SEGMENTAL RESECTION, RIGHT BREAST;  Surgeon: Shanika Beaver MD;  Location: [facility]     CHOLECYSTECTOMY       COLONOSCOPY       COLONOSCOPY  2013     hysterectomy with D and C  2017         ORTHOPEDIC SURGERY      heel surgery     ORTHOPEDIC SURGERY      rt knee meniiscus tear       Social History     Tobacco Use     Smoking status: Former Smoker     Packs/day: 0.10     Years: 30.00     Pack years: 3.00     Types: Cigarettes     Start date: 8/15/1974     Last attempt to quit: 3/3/2013     Years since quittin.2     Smokeless tobacco: Never Used   Substance Use Topics     Alcohol use: Yes     Family History   Problem Relation Age of Onset     C.A.D. Father      Coronary Artery Disease Father      Cerebrovascular Disease Father      Prostate Cancer Father      Hypertension Mother      Cerebrovascular Disease Mother      Breast Cancer Mother 55         age 71 left mastectomy     Thyroid Disease Other      Thyroid Disease Sister      Diabetes No family hx of      Asthma No family hx of      Hyperlipidemia No family hx of      Colon Cancer No family hx of      Other Cancer No family hx of      Anesthesia Reaction No family hx of      Genetic Disorder No family hx of          Current Outpatient Medications   Medication Sig Dispense Refill     acetaminophen (TYLENOL 8 HOUR ARTHRITIS PAIN) 650 MG CR tablet Take 650 mg by mouth every 8 hours as needed for mild pain or fever       albuterol (2.5 MG/3ML) 0.083% neb solution Take 1 vial (2.5 mg) by nebulization every 4 hours as needed for shortness of breath /  dyspnea or wheezing 1 Box 3     ASPIRIN PO Take 81 mg by mouth daily       calcium-vitamin D (CALTRATE) 600-400 MG-UNIT per tablet Take 1 tablet by mouth 2 times daily       cyanocobalamin ([employer]S VITAMIN  B12) 1000 MCG TABS Take 1,000 mcg by mouth daily 30 tablet      doxylamine (UNISOM) 25 MG TABS tablet Take 25 mg by mouth nightly as needed 1-2 at HS as needed       escitalopram (LEXAPRO) 20 MG tablet Take 1 tablet (20 mg) by mouth daily 90 tablet 3     lisinopril (PRINIVIL/ZESTRIL) 20 MG tablet Take 1 tablet (20 mg) by mouth daily 30 tablet 0     Multiple Vitamin (DAILY MULTIVITAMIN PO) Take one tablet by oral route every day with food       nystatin (MYCOSTATIN) 405670 UNIT/GM POWD Apply 100,000 g topically 2 times daily as needed 60 g 3     Omega-3 Fatty Acids (OMEGA 3 PO) Take 1,000 mg by mouth daily        tamoxifen (NOLVADEX) 20 MG tablet Take 1 Tab by mouth one time a day. 90 tablet 1     UNABLE TO FIND MEDICATION NAME: phedermine 15mg take 1/2 tablet once daily       VENTOLIN  (90 Base) MCG/ACT Inhaler Inhale 2 Puffs into the lungs every four hours as needed for shortness of breath / dyspnea or wheezing 18 g 1     VITAMIN D, CHOLECALCIFEROL, PO Take 5,000 Units by mouth daily        Allergies   Allergen Reactions     Adhesive Tape      Steri strips left scar.     Latex      Possible allergy.     Recent Labs   Lab Test 02/06/19  1541 08/08/18  1540  02/20/18  1107 02/14/18  1638  02/05/16  0802  03/02/15  1548   LDL  --   --   --  99  --   --   --   --  80   HDL  --   --   --  50  --   --   --   --  45*   TRIG  --   --   --  113  --   --   --   --  175*   ALT 30 31  --   --  31   < >  --    < > 30   CR 0.56 0.72   < >  --  0.81   < > 0.55   < > 0.61   GFRESTIMATED >90 83  --   --  72   < > >90  Non  GFR Calc     < > >90  Non  GFR Calc     GFRESTBLACK >90 >90  --   --  87   < > >90   GFR Calc     < > >90   GFR Calc     POTASSIUM 3.6 4.0   <  >  --  3.9   < > 3.8   < > 3.9   TSH  --   --   --  0.95  --   --  1.32  --   --     < > = values in this interval not displayed.      BP Readings from Last 3 Encounters:   05/21/19 142/76   02/14/19 142/78   10/03/18 140/80    Wt Readings from Last 3 Encounters:   05/21/19 122.6 kg (270 lb 3.2 oz)   02/14/19 125.3 kg (276 lb 3.2 oz)   10/03/18 130.2 kg (287 lb)               ROS: 10 point ROS neg other than the symptoms noted above in the HPI.       Exam:  Constitutional: healthy, alert and no distress  Head: Normocephalic. No masses, lesions, tenderness or abnormalities  Neck: Neck supple. No adenopathy. Thyroid symmetric, normal size,  ENT: Normal other than irritation of left ear canal  Cardiovascular: negative  Respiratory: negative  Gastrointestinal: negative  : Deferred  Musculoskeletal: Right calf pain, no swelling, peripheral pulses are normal - good distal CMS, negative Homans sign  Skin: no suspicious lesions or rashes  Psychiatric: mentation appears normal  Hematologic/Lymphatic/Immunologic: Normal cervical lymph nodes       Assessment & Plan     1. Benign essential hypertension - Goal 120's/70's  - lisinopril (PRINIVIL/ZESTRIL) 20 MG tablet; Take 1.5 tablets (30 mg) by mouth daily  Dispense: 135 tablet; Refill: 1  - BP recheck 2 weeks    2. Irritation of ear, left  - Neosporin or other antibiotic ointment OTC to q-tip - to outer  ear canal twice daily    3. Cramps of right lower extremity  - US Lower Extremity Venous Duplex Right; Future    4. Right calf pain  - US Lower Extremity Venous Duplex Right; Future      Hold tylenol  Try Aleve or Ibuprofen OTC  Heat to calf for comfort  Gentle stretches  Topical Biofreeze or Aspercream with lidocaine OTC  To ER with increased concerns           Jackie Hitchcock NP  [facility]

## 2019-05-21 ENCOUNTER — OFFICE VISIT (OUTPATIENT)
Dept: FAMILY MEDICINE | Facility: OTHER | Age: 59
End: 2019-05-21
Attending: NURSE PRACTITIONER
Payer: COMMERCIAL

## 2019-05-21 VITALS
BODY MASS INDEX: 43.42 KG/M2 | TEMPERATURE: 97.4 F | HEIGHT: 66 IN | SYSTOLIC BLOOD PRESSURE: 142 MMHG | HEART RATE: 101 BPM | OXYGEN SATURATION: 97 % | DIASTOLIC BLOOD PRESSURE: 76 MMHG | RESPIRATION RATE: 18 BRPM | WEIGHT: 270.2 LBS

## 2019-05-21 DIAGNOSIS — M79.661 RIGHT CALF PAIN: ICD-10-CM

## 2019-05-21 DIAGNOSIS — H93.8X2 IRRITATION OF EAR, LEFT: ICD-10-CM

## 2019-05-21 DIAGNOSIS — R25.2 CRAMPS OF RIGHT LOWER EXTREMITY: ICD-10-CM

## 2019-05-21 DIAGNOSIS — I10 BENIGN ESSENTIAL HYPERTENSION: Primary | ICD-10-CM

## 2019-05-21 PROBLEM — F33.9 RECURRENT MAJOR DEPRESSIVE DISORDER (H): Status: ACTIVE | Noted: 2019-05-21

## 2019-05-21 PROCEDURE — 99214 OFFICE O/P EST MOD 30 MIN: CPT | Performed by: NURSE PRACTITIONER

## 2019-05-21 RX ORDER — LISINOPRIL 20 MG/1
30 TABLET ORAL DAILY
Qty: 135 TABLET | Refills: 1 | Status: SHIPPED | OUTPATIENT
Start: 2019-05-21 | End: 2019-11-22

## 2019-05-21 ASSESSMENT — MIFFLIN-ST. JEOR: SCORE: 1812.37

## 2019-05-21 ASSESSMENT — PAIN SCALES - GENERAL: PAINLEVEL: MILD PAIN (2)

## 2019-05-21 NOTE — PATIENT INSTRUCTIONS
Assessment & Plan     1. Benign essential hypertension - Goal 120's/70's  - lisinopril (PRINIVIL/ZESTRIL) 20 MG tablet; Take 1.5 tablets (30 mg) by mouth daily  Dispense: 135 tablet; Refill: 1  - BP recheck 2 weeks    2. Irritation of ear, left  - Neosporin or other antibiotic ointment OTC to q-tip - to outer  ear canal twice daily    3. Cramps of right lower extremity  - US Lower Extremity Venous Duplex Right; Future    4. Right calf pain  - US Lower Extremity Venous Duplex Right; Future      Hold tylenol  Try Aleve or Ibuprofen OTC  Heat to calf for comfort  Gentle stretches  Topical Biofreeze or Aspercream with lidocaine OTC  To ER with increased concerns           Jackie Hitchcock NP  [facility]

## 2019-05-21 NOTE — NURSING NOTE
"Chief Complaint   Patient presents with     Hypertension     Ear Problem       Initial /76 (BP Location: Left arm, Patient Position: Chair, Cuff Size: Adult Large)   Pulse 101   Temp 97.4  F (36.3  C) (Tympanic)   Resp 18   Ht 1.676 m (5' 6\")   Wt 122.6 kg (270 lb 3.2 oz)   SpO2 97%   BMI 43.61 kg/m   Estimated body mass index is 43.61 kg/m  as calculated from the following:    Height as of this encounter: 1.676 m (5' 6\").    Weight as of this encounter: 122.6 kg (270 lb 3.2 oz).  Medication Reconciliation: complete    Pamela M. Lechevalier, LPN    "

## 2019-05-24 ENCOUNTER — TRANSFERRED RECORDS (OUTPATIENT)
Dept: HEALTH INFORMATION MANAGEMENT | Facility: CLINIC | Age: 59
End: 2019-05-24

## 2019-05-29 ENCOUNTER — TELEPHONE (OUTPATIENT)
Dept: FAMILY MEDICINE | Facility: OTHER | Age: 59
End: 2019-05-29

## 2019-07-09 DIAGNOSIS — J98.9 REACTIVE AIRWAY DISEASE THAT IS NOT ASTHMA: Primary | ICD-10-CM

## 2019-07-09 DIAGNOSIS — J45.30 MILD PERSISTENT ASTHMA WITHOUT COMPLICATION: ICD-10-CM

## 2019-07-09 RX ORDER — ALBUTEROL SULFATE 90 UG/1
AEROSOL, METERED RESPIRATORY (INHALATION)
Qty: 18 G | Refills: 1 | Status: CANCELLED | OUTPATIENT
Start: 2019-07-09

## 2019-07-09 RX ORDER — ALBUTEROL SULFATE 90 UG/1
AEROSOL, METERED RESPIRATORY (INHALATION)
Qty: 18 G | Refills: 1 | Status: SHIPPED | OUTPATIENT
Start: 2019-07-09 | End: 2020-12-08

## 2019-07-09 NOTE — Clinical Note
Jackie Ornelas- I fixed this Ventolin order because the original had an associated dx to a mild persistent asthma dx, which I see she has Reactive Airway Disease that is not asthma dx on her problem list. I wanted to fix this so we remove her from the asthma quality measure. Thanks! Vania Linares

## 2019-08-11 NOTE — PROGRESS NOTES
Oncology Follow-up Visit:  August 14, 2019    Reason for Visit:  Patient presents with:  RECHECK: lobular carcinoma of situ right breast     Nursing Note and documentation reviewed: yes    HPI:  This is a 59-year-old female patient who presents to the oncology clinic today in followup of DCIS and LCIS of the right breast diagnosed in January 2014 and underwent lumpectomy in February 2014. She was placed on tamoxifen 20 mg daily in March of 2014 and discontinued this 4/1/2019.    She presents today stating she is doing fairly well.  She does have complaints of discomfort to the outer aspect of her right lower leg since she discontinue the tamoxifen in April.  She was seen by her PCP and ultrasound was completed that was negative.  She tries a cream to the area but this does not help.  She feels it is deep in the leg has no pain when she pushes on it.  The pain is there fairly constant but it is less with sitting and worse with ambulation.  She has a couple of dry spots to the left outer breast feels this is related to the heat.  She has lost 37 pounds intentionally over the past year and continues to try losing weight.  She does admit to 3 nights a week will have a few martinis.  This is actually decreased compared to over a year ago.    Oncologic History:     Vandana had undergone an abnormal mammogram in November 2013 which showed microcalcifications in the right breast suspicious for malignancy. She subsequently underwent stereotactic biopsy on November 21, 2013 with original interpretation of histopathology being that of hyperplasia without atypia. This was then over read as part of routine protocol in January 2014. 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 She was evaluated by Dr. Shanika Beaver in Gen. Surgery on January 28, 2014 and bilateral MRI of the breast was obtained which revealed oval-shaped nodular densities in both breasts consistent with inframammary lymph nodes.        The patient was seen by Dr. Chávez of radiation oncology on 1/31/2014 and his impression was that the patient had right DCIS and would require definitive surgery.      The patient underwent right excisional breast biopsy on 2/13/2014 and pathology showed LCIS, multifocal, completely excised and low-grade, grade 1, DCIS which was completely excised. Tumor size was at least 0.5 cm DCIS grade 1; margins were uninvolved by DCIS. Estrogen receptor was positive at 90% and progesterone receptor was positive at 50%. Patient was staged pathologic Tis associated with LCIS. She was started on tamoxifen.      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 Provider to review     Recurrent major depressive disorder (H) 5/21/2019     Unspecified immunity deficiency      Vitamin D deficiency 1/26/2016       Past Surgical History:   Procedure Laterality Date     BIOPSY      right breast--neg     BIOPSY BREAST NEEDLE LOCALIZATION  2/13/2014    Procedure: BIOPSY BREAST NEEDLE LOCALIZATION;  WIRE LOCALIZED SEGMENTAL RESECTION, RIGHT BREAST;  Surgeon: Shanika Beaver MD;  Location: [facility]     CHOLECYSTECTOMY  2010     COLONOSCOPY  2009     COLONOSCOPY  11/12/2013      DILATION AND CURETTAGE, OPERATIVE HYSTEROSCOPY, COMBINED  2017    [facility]     ORTHOPEDIC SURGERY      heel surgery     ORTHOPEDIC SURGERY      rt knee meniiscus tear       Family History   Problem Relation Age of Onset     C.A.D. Father      Coronary Artery Disease Father      Cerebrovascular Disease Father      Prostate Cancer Father      Hypertension Mother      Cerebrovascular Disease Mother      Breast Cancer Mother 55         age 71 left mastectomy     Thyroid Disease Other      Thyroid Disease Sister      Diabetes No family hx of      Asthma No family hx of      Hyperlipidemia No family hx of      Colon Cancer No family hx of      Other Cancer No family hx of      Anesthesia Reaction No family hx of      Genetic Disorder No family hx of        Social History     Socioeconomic History     Marital status: Single     Spouse name: Not on file     Number of children: Not on file     Years of education: Not on file     Highest education level: Not on file   Occupational History     Not on file   Social Needs     Financial resource strain: Not on file     Food insecurity:     Worry: Not on file     Inability: Not on file     Transportation needs:     Medical: Not on file     Non-medical: Not on file   Tobacco Use     Smoking status: Former Smoker     Packs/day: 0.10     Years: 30.00     Pack years: 3.00     Types: Cigarettes     Start date: 8/15/1974     Last attempt to quit: 3/3/2013     Years since quittin.4     Smokeless tobacco: Never Used   Substance and Sexual Activity     Alcohol use: Yes     Drug use: No     Sexual activity: Yes     Partners: Male   Lifestyle     Physical activity:     Days per week: Not on file     Minutes per session: Not on file     Stress: Not on file   Relationships     Social connections:     Talks on phone: Not on file     Gets together: Not on file     Attends Jew service: Not on file     Active member of club or organization: Not on file     Attends meetings of  clubs or organizations: Not on file     Relationship status: Not on file     Intimate partner violence:     Fear of current or ex partner: Not on file     Emotionally abused: Not on file     Physically abused: Not on file     Forced sexual activity: Not on file   Other Topics Concern     Parent/sibling w/ CABG, MI or angioplasty before 65F 55M? No   Social History Narrative     Not on file       Current Outpatient Medications   Medication     acetaminophen (TYLENOL 8 HOUR ARTHRITIS PAIN) 650 MG CR tablet     albuterol (2.5 MG/3ML) 0.083% neb solution     albuterol (VENTOLIN HFA) 108 (90 Base) MCG/ACT inhaler     ASPIRIN PO     calcium-vitamin D (CALTRATE) 600-400 MG-UNIT per tablet     cyanocobalamin (CVS VITAMIN  B12) 1000 MCG TABS     doxylamine (UNISOM) 25 MG TABS tablet     escitalopram (LEXAPRO) 20 MG tablet     lisinopril (PRINIVIL/ZESTRIL) 20 MG tablet     Multiple Vitamin (DAILY MULTIVITAMIN PO)     nystatin (MYCOSTATIN) 830344 UNIT/GM POWD     Omega-3 Fatty Acids (OMEGA 3 PO)     UNABLE TO FIND     VITAMIN D, CHOLECALCIFEROL, PO     No current facility-administered medications for this visit.         Allergies   Allergen Reactions     Adhesive Tape      Steri strips left scar.     Latex      Possible allergy.       Review Of Systems:  Constitutional:    denies fever, chills, and night sweats.  Eyes:   Feels her vision is getting worse  Ears/Nose/Throat:   denies ear pain, nose problems, difficulty swallowing  Respiratory:   denies shortness of breath, cough  Skin: red sore lump to the beneath the left arm that developed after she were certain sure that she felt was rubbing on the area; she squeezed this area   Breast/Chest wall:   denies pain, lumps or discharge  Cardiovascular:   denies chest pain, palpitations, edema  Gastrointestinal:   denies abdominal pain, bloating, nausea, early satiety; no change in bowel habits or blood in stool  Genitourinary:   denies difficulty with urination, blood in  "urine  Musculoskeletal:  See HPI  Neurologic:   denies lightheadedness, headaches, numbness or tingling  Psychiatric:   denies anxiety, depression  Hematologic/Lymphatic/Immunologic:   denies easy bruising, easy bleeding, lumps or bumps noted  Endocrine:   Denies increased thirst    Physical Exam:  BP (!) 164/72   Pulse 106   Temp 98.6  F (37  C) (Tympanic)   Ht 1.676 m (5' 6\")   Wt 122 kg (269 lb)   SpO2 98%   BMI 43.42 kg/m      GENERAL APPEARANCE: Healthy, alert and in no acute distress.  HEENT: Normocephalic, Sclerae anicteric. Oropharynx without ulcers, lesions, or thrush.  NECK:   No asymmetry or masses, no thyromegaly.  LYMPHATICS: No palpable cervical, supraclavicular, axillary, or inguinal nodes   RESP: Lungs clear to auscultation bilaterally, respirations regular and easy  CARDIOVASCULAR: Regular rate and rhythm. Normal S1, S2; no murmur, gallop, or rub.  ABDOMEN: Soft, nontender. Bowel sounds auscultated all 4 quadrants. No palpable organomegaly or masses.  BREAST/Chest wall: no lumps, masses or tenderness bilaterally  MUSCULOSKELETAL: Extremities without gross deformities noted. No edema of bilateral lower extremities.  SKIN: small red cyst to beneath left arm; few small red spots to left breast  NEURO: Alert and oriented x 3.  Gait steady.  PSYCHIATRIC: Mentation and affect appear normal.  Mood appropriate.    Laboratory:  Results for orders placed or performed in visit on 08/14/19   Comprehensive metabolic panel   Result Value Ref Range    Sodium 140 133 - 144 mmol/L    Potassium 3.8 3.4 - 5.3 mmol/L    Chloride 106 94 - 109 mmol/L    Carbon Dioxide 28 20 - 32 mmol/L    Anion Gap 6 3 - 14 mmol/L    Glucose 147 (H) 70 - 99 mg/dL    Urea Nitrogen 18 7 - 30 mg/dL    Creatinine 0.67 0.52 - 1.04 mg/dL    GFR Estimate >90 >60 mL/min/[1.73_m2]    GFR Estimate If Black >90 >60 mL/min/[1.73_m2]    Calcium 9.3 8.5 - 10.1 mg/dL    Bilirubin Total 0.4 0.2 - 1.3 mg/dL    Albumin 3.8 3.4 - 5.0 g/dL    Protein " Total 7.7 6.8 - 8.8 g/dL    Alkaline Phosphatase 57 40 - 150 U/L    ALT 66 (H) 0 - 50 U/L    AST 38 0 - 45 U/L   CBC with platelets differential   Result Value Ref Range    WBC 7.4 4.0 - 11.0 10e9/L    RBC Count 4.30 3.8 - 5.2 10e12/L    Hemoglobin 13.0 11.7 - 15.7 g/dL    Hematocrit 39.8 35.0 - 47.0 %    MCV 93 78 - 100 fl    MCH 30.2 26.5 - 33.0 pg    MCHC 32.7 31.5 - 36.5 g/dL    RDW 13.0 10.0 - 15.0 %    Platelet Count 268 150 - 450 10e9/L    Diff Method Automated Method     % Neutrophils 60.9 %    % Lymphocytes 28.2 %    % Monocytes 7.7 %    % Eosinophils 2.3 %    % Basophils 0.4 %    % Immature Granulocytes 0.5 %    Nucleated RBCs 0 0 /100    Absolute Neutrophil 4.5 1.6 - 8.3 10e9/L    Absolute Lymphocytes 2.1 0.8 - 5.3 10e9/L    Absolute Monocytes 0.6 0.0 - 1.3 10e9/L    Absolute Eosinophils 0.2 0.0 - 0.7 10e9/L    Absolute Basophils 0.0 0.0 - 0.2 10e9/L    Abs Immature Granulocytes 0.0 0 - 0.4 10e9/L    Absolute Nucleated RBC 0.0        Imaging Studies:  None completed for today's visit      ASSESSMENT/PLAN:    #1 DCIS: Ductal carcinoma in situ of the right breast and lobular carcinoma in situ of the right breast diagnosed January 2014. Patient underwent lumpectomy. She was then placed on tamoxifen 20mg daily and completed 5 years of therapy.  We will see her back in 1 year with a CBC, CMP. Patient will have her screening mammogram in January 2020.      #2 Use of Tamoxifen:   I continue to recommend a yearly GYN exam.     #3  Elevated ALT:  Will repeat her liver functions in 1 month.     #4  Leg pain: Unsure of etiology.  We did discuss possible MRI as she is had the discomfort for greater than 4 months that is continuous.  Patient states she would like to think about this and she will get back to me or she will follow-up with her PCP.    I encouraged patient to call with any questions or concerns.     Matilde Patrick APRN, FNP-BC, AOCNP

## 2019-08-14 ENCOUNTER — ONCOLOGY VISIT (OUTPATIENT)
Dept: ONCOLOGY | Facility: OTHER | Age: 59
End: 2019-08-14
Attending: NURSE PRACTITIONER
Payer: COMMERCIAL

## 2019-08-14 VITALS
WEIGHT: 269 LBS | TEMPERATURE: 98.6 F | HEIGHT: 66 IN | BODY MASS INDEX: 43.23 KG/M2 | DIASTOLIC BLOOD PRESSURE: 72 MMHG | HEART RATE: 106 BPM | OXYGEN SATURATION: 98 % | SYSTOLIC BLOOD PRESSURE: 164 MMHG

## 2019-08-14 DIAGNOSIS — Z79.810 ENCOUNTER FOR MONITORING TAMOXIFEN THERAPY: ICD-10-CM

## 2019-08-14 DIAGNOSIS — D05.01 LOBULAR CARCINOMA IN SITU OF RIGHT BREAST: ICD-10-CM

## 2019-08-14 DIAGNOSIS — D05.11 DUCTAL CARCINOMA IN SITU OF RIGHT BREAST: Primary | ICD-10-CM

## 2019-08-14 DIAGNOSIS — R74.01 ELEVATED ALT MEASUREMENT: ICD-10-CM

## 2019-08-14 DIAGNOSIS — Z12.31 VISIT FOR SCREENING MAMMOGRAM: ICD-10-CM

## 2019-08-14 DIAGNOSIS — Z51.81 ENCOUNTER FOR MONITORING TAMOXIFEN THERAPY: ICD-10-CM

## 2019-08-14 DIAGNOSIS — M79.604 PAIN OF RIGHT LOWER EXTREMITY: ICD-10-CM

## 2019-08-14 DIAGNOSIS — D05.11 DUCTAL CARCINOMA IN SITU OF RIGHT BREAST: ICD-10-CM

## 2019-08-14 LAB
ALBUMIN SERPL-MCNC: 3.8 G/DL (ref 3.4–5)
ALP SERPL-CCNC: 57 U/L (ref 40–150)
ALT SERPL W P-5'-P-CCNC: 66 U/L (ref 0–50)
ANION GAP SERPL CALCULATED.3IONS-SCNC: 6 MMOL/L (ref 3–14)
AST SERPL W P-5'-P-CCNC: 38 U/L (ref 0–45)
BASOPHILS # BLD AUTO: 0 10E9/L (ref 0–0.2)
BASOPHILS NFR BLD AUTO: 0.4 %
BILIRUB SERPL-MCNC: 0.4 MG/DL (ref 0.2–1.3)
BUN SERPL-MCNC: 18 MG/DL (ref 7–30)
CALCIUM SERPL-MCNC: 9.3 MG/DL (ref 8.5–10.1)
CHLORIDE SERPL-SCNC: 106 MMOL/L (ref 94–109)
CO2 SERPL-SCNC: 28 MMOL/L (ref 20–32)
CREAT SERPL-MCNC: 0.67 MG/DL (ref 0.52–1.04)
DIFFERENTIAL METHOD BLD: NORMAL
EOSINOPHIL # BLD AUTO: 0.2 10E9/L (ref 0–0.7)
EOSINOPHIL NFR BLD AUTO: 2.3 %
ERYTHROCYTE [DISTWIDTH] IN BLOOD BY AUTOMATED COUNT: 13 % (ref 10–15)
GFR SERPL CREATININE-BSD FRML MDRD: >90 ML/MIN/{1.73_M2}
GLUCOSE SERPL-MCNC: 147 MG/DL (ref 70–99)
HCT VFR BLD AUTO: 39.8 % (ref 35–47)
HGB BLD-MCNC: 13 G/DL (ref 11.7–15.7)
IMM GRANULOCYTES # BLD: 0 10E9/L (ref 0–0.4)
IMM GRANULOCYTES NFR BLD: 0.5 %
LYMPHOCYTES # BLD AUTO: 2.1 10E9/L (ref 0.8–5.3)
LYMPHOCYTES NFR BLD AUTO: 28.2 %
MCH RBC QN AUTO: 30.2 PG (ref 26.5–33)
MCHC RBC AUTO-ENTMCNC: 32.7 G/DL (ref 31.5–36.5)
MCV RBC AUTO: 93 FL (ref 78–100)
MONOCYTES # BLD AUTO: 0.6 10E9/L (ref 0–1.3)
MONOCYTES NFR BLD AUTO: 7.7 %
NEUTROPHILS # BLD AUTO: 4.5 10E9/L (ref 1.6–8.3)
NEUTROPHILS NFR BLD AUTO: 60.9 %
NRBC # BLD AUTO: 0 10*3/UL
NRBC BLD AUTO-RTO: 0 /100
PLATELET # BLD AUTO: 268 10E9/L (ref 150–450)
POTASSIUM SERPL-SCNC: 3.8 MMOL/L (ref 3.4–5.3)
PROT SERPL-MCNC: 7.7 G/DL (ref 6.8–8.8)
RBC # BLD AUTO: 4.3 10E12/L (ref 3.8–5.2)
SODIUM SERPL-SCNC: 140 MMOL/L (ref 133–144)
WBC # BLD AUTO: 7.4 10E9/L (ref 4–11)

## 2019-08-14 PROCEDURE — 99214 OFFICE O/P EST MOD 30 MIN: CPT | Performed by: NURSE PRACTITIONER

## 2019-08-14 PROCEDURE — 80053 COMPREHEN METABOLIC PANEL: CPT | Performed by: NURSE PRACTITIONER

## 2019-08-14 PROCEDURE — 85025 COMPLETE CBC W/AUTO DIFF WBC: CPT | Performed by: NURSE PRACTITIONER

## 2019-08-14 PROCEDURE — 36415 COLL VENOUS BLD VENIPUNCTURE: CPT | Performed by: NURSE PRACTITIONER

## 2019-08-14 ASSESSMENT — PAIN SCALES - GENERAL: PAINLEVEL: MODERATE PAIN (5)

## 2019-08-14 ASSESSMENT — MIFFLIN-ST. JEOR: SCORE: 1806.93

## 2019-08-14 ASSESSMENT — PATIENT HEALTH QUESTIONNAIRE - PHQ9: SUM OF ALL RESPONSES TO PHQ QUESTIONS 1-9: 3

## 2019-08-14 NOTE — PATIENT INSTRUCTIONS
I would like you to go to lab in [place] lab in 1 month for a recheck on your liver functions.    We will schedule you for a mammogram in January 2020.    We would like to see you back in 1 year.  Please come 30 minutes prior for lab work.     If you have any questions please call [phone]    Other instructions:  none

## 2019-08-14 NOTE — NURSING NOTE
"Chief Complaint   Patient presents with     RECHECK     lobular carcinoma of situ right breast       Initial BP (!) 164/72   Pulse 106   Temp 98.6  F (37  C) (Tympanic)   Ht 1.676 m (5' 6\")   Wt 122 kg (269 lb)   SpO2 98%   BMI 43.42 kg/m   Estimated body mass index is 43.42 kg/m  as calculated from the following:    Height as of this encounter: 1.676 m (5' 6\").    Weight as of this encounter: 122 kg (269 lb).  Medication Reconciliation: complete   Immunizations reviewed; phq=3; pain=5; ahd- has info.  Lidia La  "

## 2019-10-09 ENCOUNTER — TELEPHONE (OUTPATIENT)
Dept: FAMILY MEDICINE | Facility: OTHER | Age: 59
End: 2019-10-09

## 2019-10-09 RX ORDER — HYDROCODONE BITARTRATE AND ACETAMINOPHEN 5; 325 MG/1; MG/1
TABLET ORAL
COMMUNITY
Start: 2019-10-07 | End: 2019-10-11

## 2019-10-09 NOTE — TELEPHONE ENCOUNTER
8:27 AM    Reason for Call: Phone Call    Description: Patient called and is requesting an overbook for extreme right knee pain. Patient was seen in the Urgent care on 10-7-19. patient states she is in a lot of pain and needs to be seen.     Was an appointment offered for this call? No  If yes : Appointment type              Date    Preferred method for responding to this message: Telephone Call  What is your phone number ?[phone]    If we cannot reach you directly, may we leave a detailed response at the number you provided? Yes    Can this message wait until your PCP/provider returns, if available today? Not applicable    Blanca Cisneros MA

## 2019-10-09 NOTE — TELEPHONE ENCOUNTER
Pt states she will need a letter for work when she comes in on Friday.  Pt states she was given 10 Norco to take 1 every six hours and she states that this is not helping with the pain.  Pt states she has also been icing it.  Any recommendations?

## 2019-10-09 NOTE — PROGRESS NOTES
Vandana Jaeger is a 59 year old female who presents to clinic today for the following health issues:      Knee pain    Onset: 1 week    Description:   Location: right knee  Character: Sharp, Dull ache and Stabbing    Intensity: moderate, severe, 7/10    Progression of Symptoms: worse    Accompanying Signs & Symptoms:  Other symptoms: swelling    History:   Previous similar pain: YES- has had meniscus tear in the past      Precipitating factors:   Trauma or overuse: no     Alleviating factors:  Improved by: nothing  Therapies Tried and outcome: Pain medication given in ED/UC          Exam Accession# [id number]    XR KNEE RIGHT 3 VIEWS    CLINICAL INDICATION: Right knee pain/swelling;     COMPARISON: Right knee radiograph 01/12/2018, 08/02/2011, 07/23/2010.    TECHNIQUE: Three views of the right knee.    FINDINGS:  No acute fractures. Severe medial, moderate lateral, and mild patellofemoral compartment narrowing/osteophytosis. Osteopenic appearance. Moderate suprapatellar joint effusion.    IMPRESSION:  Severe medial compartment arthropathy. Kellgren-Christian Grade 4. If symptoms persist, consider MR knee for further evaluation.    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     Ductal carcinoma in situ (DCIS) of right breast     Morbid obesity (H)     Reactive airway disease that is not asthma     Contact dermatitis and other eczema, due to unspecified cause     Essential hypertension     Vitamin D deficiency     ACP (advance care planning)     Use of tamoxifen (Nolvadex)     Morbid obesity with BMI of 50.0-59.9, adult (H)     Recurrent major depressive disorder (H)     Past Surgical History:   Procedure Laterality Date     BIOPSY      right breast--neg     BIOPSY BREAST NEEDLE LOCALIZATION  2/13/2014    Procedure: BIOPSY  BREAST NEEDLE LOCALIZATION;  WIRE LOCALIZED SEGMENTAL RESECTION, RIGHT BREAST;  Surgeon: Shanika Beaver MD;  Location: [facility]     CHOLECYSTECTOMY       COLONOSCOPY       COLONOSCOPY  2013     DILATION AND CURETTAGE, OPERATIVE HYSTEROSCOPY, COMBINED  2017    [facility]     ORTHOPEDIC SURGERY      heel surgery     ORTHOPEDIC SURGERY      rt knee meniiscus tear       Social History     Tobacco Use     Smoking status: Former Smoker     Packs/day: 0.10     Years: 30.00     Pack years: 3.00     Types: Cigarettes     Start date: 8/15/1974     Last attempt to quit: 3/3/2013     Years since quittin.6     Smokeless tobacco: Never Used   Substance Use Topics     Alcohol use: Yes     Family History   Problem Relation Age of Onset     C.A.D. Father      Coronary Artery Disease Father      Cerebrovascular Disease Father      Prostate Cancer Father      Hypertension Mother      Cerebrovascular Disease Mother      Breast Cancer Mother 55         age 71 left mastectomy     Thyroid Disease Other      Thyroid Disease Sister      Diabetes No family hx of      Asthma No family hx of      Hyperlipidemia No family hx of      Colon Cancer No family hx of      Other Cancer No family hx of      Anesthesia Reaction No family hx of      Genetic Disorder No family hx of                Current Outpatient Medications   Medication Sig Dispense Refill     acetaminophen-codeine (TYLENOL #3) 300-30 MG tablet Take 1 tablet by mouth every 6 hours as needed for severe pain (1-2 po q 6 hours PRN) 30 tablet 0     acetaminophen (TYLENOL 8 HOUR ARTHRITIS PAIN) 650 MG CR tablet Take 650 mg by mouth every 8 hours as needed for mild pain or fever       albuterol (2.5 MG/3ML) 0.083% neb solution Take 1 vial (2.5 mg) by nebulization every 4 hours as needed for shortness of breath / dyspnea or wheezing 1 Box 3     albuterol (VENTOLIN HFA) 108 (90 Base) MCG/ACT inhaler Inhale 2 Puffs into the lungs every four hours as needed for shortness  of breath / dyspnea or wheezing 18 g 1     ASPIRIN PO Take 81 mg by mouth daily       calcium-vitamin D (CALTRATE) 600-400 MG-UNIT per tablet Take 1 tablet by mouth 2 times daily       cyanocobalamin ([employer]S VITAMIN  B12) 1000 MCG TABS Take 1,000 mcg by mouth daily 30 tablet      doxylamine (UNISOM) 25 MG TABS tablet Take 25 mg by mouth nightly as needed 1-2 at HS as needed       escitalopram (LEXAPRO) 20 MG tablet Take 1 tablet (20 mg) by mouth daily 90 tablet 3     lisinopril (PRINIVIL/ZESTRIL) 20 MG tablet Take 1.5 tablets (30 mg) by mouth daily 135 tablet 1     Multiple Vitamin (DAILY MULTIVITAMIN PO) Take one tablet by oral route every day with food       nystatin (MYCOSTATIN) 074501 UNIT/GM POWD Apply 100,000 g topically 2 times daily as needed 60 g 3     Omega-3 Fatty Acids (OMEGA 3 PO) Take 1,000 mg by mouth daily        UNABLE TO FIND MEDICATION NAME: phedermine 15mg take 1/2 tablet once daily       VITAMIN D, CHOLECALCIFEROL, PO Take 5,000 Units by mouth daily            Allergies   Allergen Reactions     Adhesive Tape      Steri strips left scar.     Latex      Possible allergy.     Recent Labs   Lab Test 08/14/19  1420 02/06/19  1541 08/08/18  1540  02/20/18  1107  02/05/16  0802  03/02/15  1548   LDL  --   --   --   --  99  --   --   --  80   HDL  --   --   --   --  50  --   --   --  45*   TRIG  --   --   --   --  113  --   --   --  175*   ALT 66* 30 31  --   --    < >  --    < > 30   CR 0.67 0.56 0.72   < >  --    < > 0.55   < > 0.61   GFRESTIMATED >90 >90 83  --   --    < > >90  Non  GFR Calc     < > >90  Non  GFR Calc     GFRESTBLACK >90 >90 >90  --   --    < > >90   GFR Calc     < > >90   GFR Calc     POTASSIUM 3.8 3.6 4.0   < >  --    < > 3.8   < > 3.9   TSH  --   --   --   --  0.95  --  1.32  --   --     < > = values in this interval not displayed.          BP Readings from Last 3 Encounters:   10/11/19 (!) 150/90   08/14/19 (!) 164/72    05/21/19 142/76    Wt Readings from Last 3 Encounters:   10/11/19 122.2 kg (269 lb 8 oz)   08/14/19 122 kg (269 lb)   05/21/19 122.6 kg (270 lb 3.2 oz)            PHQ-9 SCORE 8/14/2019 10/11/2019 10/11/2019   PHQ-9 Total Score - - -   PHQ-9 Total Score 3 3 3     REUBEN-7 SCORE 10/3/2018 10/11/2019 10/11/2019   Total Score 0 1 1            Reviewed and updated as needed this visit by Provider         Review of Systems   ROS COMP: Constitutional, HEENT, cardiovascular, pulmonary, gi and gu systems are negative, except as otherwise noted.        Objective    BP (!) 150/90 (BP Location: Left arm, Patient Position: Sitting, Cuff Size: Adult Large)   Pulse 97   Temp 98.5  F (36.9  C) (Tympanic)   Wt 122.2 kg (269 lb 8 oz)   SpO2 98%   BMI 43.50 kg/m    Body mass index is 43.5 kg/m .         Physical Exam   GENERAL: healthy, alert and no distress  NECK: no adenopathy, no asymmetry, masses, or scars and thyroid normal to palpation  RESP: lungs clear to auscultation - no rales, rhonchi or wheezes  CV: regular rate and rhythm, normal S1 S2, no S3 or S4, no murmur, click or rub, no peripheral edema and peripheral pulses strong  ABDOMEN: soft, nontender, no hepatosplenomegaly, no masses and bowel sounds normal  MS: Severe pain - right knee- medial ,and sub patellar - medial swelling, radiation of pain to outer calf.  Stiffness, unable to bear weight            Assessment & Plan     1. Primary osteoarthritis of right knee  - acetaminophen-codeine (TYLENOL #3) 300-30 MG tablet; Take 1 tablet by mouth every 6 hours as needed for severe pain (1-2 po q 6 hours PRN)  Dispense: 30 tablet; Refill: 0  - ORTHOPEDICS ADULT REFERRAL  - Ibuprofen PRN  - Ice  - Knee sleeve  - Crutches  - Work note given  - To UC or ER as [id number]      Jackie Hitchcock, MAXX  [facility]

## 2019-10-09 NOTE — TELEPHONE ENCOUNTER
Can do her work note on Friday  If Norco is not helping with her pain, add Ibuprofen, Ice, rest  Alternatively if her pain is that severe, she can go to     Jackie ESPITIA  [phone]

## 2019-10-11 ENCOUNTER — TELEPHONE (OUTPATIENT)
Dept: FAMILY MEDICINE | Facility: OTHER | Age: 59
End: 2019-10-11

## 2019-10-11 ENCOUNTER — OFFICE VISIT (OUTPATIENT)
Dept: FAMILY MEDICINE | Facility: OTHER | Age: 59
End: 2019-10-11
Attending: NURSE PRACTITIONER
Payer: COMMERCIAL

## 2019-10-11 VITALS
OXYGEN SATURATION: 98 % | SYSTOLIC BLOOD PRESSURE: 150 MMHG | DIASTOLIC BLOOD PRESSURE: 90 MMHG | BODY MASS INDEX: 43.5 KG/M2 | HEART RATE: 97 BPM | TEMPERATURE: 98.5 F | WEIGHT: 269.5 LBS

## 2019-10-11 DIAGNOSIS — M17.11 PRIMARY OSTEOARTHRITIS OF RIGHT KNEE: Primary | ICD-10-CM

## 2019-10-11 PROCEDURE — 99214 OFFICE O/P EST MOD 30 MIN: CPT | Performed by: NURSE PRACTITIONER

## 2019-10-11 ASSESSMENT — ANXIETY QUESTIONNAIRES
IF YOU CHECKED OFF ANY PROBLEMS ON THIS QUESTIONNAIRE, HOW DIFFICULT HAVE THESE PROBLEMS MADE IT FOR YOU TO DO YOUR WORK, TAKE CARE OF THINGS AT HOME, OR GET ALONG WITH OTHER PEOPLE: NOT DIFFICULT AT ALL
GAD7 TOTAL SCORE: 1
5. BEING SO RESTLESS THAT IT IS HARD TO SIT STILL: NOT AT ALL
6. BECOMING EASILY ANNOYED OR IRRITABLE: NOT AT ALL
2. NOT BEING ABLE TO STOP OR CONTROL WORRYING: NOT AT ALL
6. BECOMING EASILY ANNOYED OR IRRITABLE: NOT AT ALL
4. TROUBLE RELAXING: NOT AT ALL
3. WORRYING TOO MUCH ABOUT DIFFERENT THINGS: SEVERAL DAYS
1. FEELING NERVOUS, ANXIOUS, OR ON EDGE: NOT AT ALL
2. NOT BEING ABLE TO STOP OR CONTROL WORRYING: NOT AT ALL
5. BEING SO RESTLESS THAT IT IS HARD TO SIT STILL: NOT AT ALL
7. FEELING AFRAID AS IF SOMETHING AWFUL MIGHT HAPPEN: NOT AT ALL
3. WORRYING TOO MUCH ABOUT DIFFERENT THINGS: SEVERAL DAYS
7. FEELING AFRAID AS IF SOMETHING AWFUL MIGHT HAPPEN: NOT AT ALL
IF YOU CHECKED OFF ANY PROBLEMS ON THIS QUESTIONNAIRE, HOW DIFFICULT HAVE THESE PROBLEMS MADE IT FOR YOU TO DO YOUR WORK, TAKE CARE OF THINGS AT HOME, OR GET ALONG WITH OTHER PEOPLE: NOT DIFFICULT AT ALL
GAD7 TOTAL SCORE: 1
1. FEELING NERVOUS, ANXIOUS, OR ON EDGE: NOT AT ALL

## 2019-10-11 ASSESSMENT — PAIN SCALES - GENERAL: PAINLEVEL: SEVERE PAIN (7)

## 2019-10-11 ASSESSMENT — PATIENT HEALTH QUESTIONNAIRE - PHQ9
5. POOR APPETITE OR OVEREATING: NOT AT ALL
SUM OF ALL RESPONSES TO PHQ QUESTIONS 1-9: 3

## 2019-10-11 NOTE — LETTER
[facility]  [address]  [place] [address]  Phone: [phone]    October 11, 2019        Vandana Jaeger  [address] [place] [address]          To whom it may concern:    RE: Vandana Jaeger    Please excuse from work 10/10/19, 10/11/19.  Patient will be out of work further notice, due to inability to bear weight.     Please contact me for questions or concerns.      Sincerely,        Jackie Hitchcock, CNP

## 2019-10-11 NOTE — PATIENT INSTRUCTIONS
Assessment & Plan     1. Primary osteoarthritis of right knee  - acetaminophen-codeine (TYLENOL #3) 300-30 MG tablet; Take 1 tablet by mouth every 6 hours as needed for severe pain (1-2 po q 6 hours PRN)  Dispense: 30 tablet; Refill: 0  - ORTHOPEDICS ADULT REFERRAL  - Ibuprofen PRN  - Ice  - Knee sleeve  - Crutches  - Work note given  - To UC or ER as [id number]      Jackie Hitchcock, MAXX  [facility] [place] [facility]

## 2019-10-11 NOTE — TELEPHONE ENCOUNTER
Pharmacy Called stating script for Tylenol #3 has two sigs, please clarify and call pharmacy.  SHEREE CARVAJAL LPN

## 2019-10-11 NOTE — NURSING NOTE
"Chief Complaint   Patient presents with     Musculoskeletal Problem       Initial BP (!) 150/90 (BP Location: Left arm, Patient Position: Sitting, Cuff Size: Adult Large)   Pulse 97   Temp 98.5  F (36.9  C) (Tympanic)   Wt 122.2 kg (269 lb 8 oz)   SpO2 98%   BMI 43.50 kg/m   Estimated body mass index is 43.5 kg/m  as calculated from the following:    Height as of 8/14/19: 1.676 m (5' 6\").    Weight as of this encounter: 122.2 kg (269 lb 8 oz).  Medication Reconciliation: complete  Ashley A. Lechevalier, LPN  "

## 2019-10-12 ASSESSMENT — ANXIETY QUESTIONNAIRES: GAD7 TOTAL SCORE: 1

## 2019-11-13 ENCOUNTER — TELEPHONE (OUTPATIENT)
Dept: FAMILY MEDICINE | Facility: OTHER | Age: 59
End: 2019-11-13

## 2019-11-13 NOTE — TELEPHONE ENCOUNTER
Left message that Yaritza would be willing to see her and she should come in if she gets the message.

## 2019-11-13 NOTE — TELEPHONE ENCOUNTER
"Patient calls today quite irritable, just not feeling well.  Believes she has shingles to her left back.  Patient states she \"is just miserable\"  Has to work and cant miss work.  Quite tearful when only appointment in clinic available tomorrow.  Patient states she is embarrassed and doesn't want to go to other clinic (I believe referring to ).  Scheduled tomorrow at 330, possible to start her on something for pain/discomfort until then or antiviral?  Please advise   "

## 2019-11-14 ENCOUNTER — OFFICE VISIT (OUTPATIENT)
Dept: INTERNAL MEDICINE | Facility: OTHER | Age: 59
End: 2019-11-14
Attending: INTERNAL MEDICINE
Payer: COMMERCIAL

## 2019-11-14 VITALS
HEART RATE: 92 BPM | DIASTOLIC BLOOD PRESSURE: 85 MMHG | BODY MASS INDEX: 42.77 KG/M2 | OXYGEN SATURATION: 95 % | SYSTOLIC BLOOD PRESSURE: 138 MMHG | WEIGHT: 265 LBS | TEMPERATURE: 98.3 F

## 2019-11-14 DIAGNOSIS — B02.9 HERPES ZOSTER WITHOUT COMPLICATION: Primary | ICD-10-CM

## 2019-11-14 PROCEDURE — 99213 OFFICE O/P EST LOW 20 MIN: CPT | Performed by: INTERNAL MEDICINE

## 2019-11-14 RX ORDER — VALACYCLOVIR HYDROCHLORIDE 1 G/1
1000 TABLET, FILM COATED ORAL 3 TIMES DAILY
Qty: 21 TABLET | Refills: 0 | Status: SHIPPED | OUTPATIENT
Start: 2019-11-14 | End: 2021-01-15

## 2019-11-14 RX ORDER — TRAMADOL HYDROCHLORIDE 50 MG/1
50 TABLET ORAL EVERY 6 HOURS PRN
Qty: 20 TABLET | Refills: 0 | Status: SHIPPED | OUTPATIENT
Start: 2019-11-14 | End: 2021-03-31

## 2019-11-14 ASSESSMENT — PAIN SCALES - GENERAL: PAINLEVEL: MODERATE PAIN (5)

## 2019-11-14 NOTE — PROGRESS NOTES
Subjective     Vandana Jaeger is a 59 year old female who presents to clinic today for the following health issues:    HPI   Rash      Duration: Since Saturday    Description  Location: left side under breast, back, possible left eye   Itching: no    Intensity:  moderate    Accompanying signs and symptoms: pain, blisters    History (similar episodes/previous evaluation): history of shingles 5 1/2 years ago    Precipitating or alleviating factors:  New exposures:  None  Recent travel: no      Therapies tried and outcome: tylenol, ibuprofen     Vandana presents today due to shingles which started about 5 days ago.  It appears this is involving the left T6 dermatome.  She still has vesicles.  She has tried tylenol and Ibuprofen with little benefit.        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     Ductal carcinoma in situ (DCIS) of right breast     Morbid obesity (H)     Reactive airway disease that is not asthma     Contact dermatitis and other eczema, due to unspecified cause     Essential hypertension     Vitamin D deficiency     ACP (advance care planning)     Use of tamoxifen (Nolvadex)     Morbid obesity with BMI of 50.0-59.9, adult (H)     Recurrent major depressive disorder (H)     Past Surgical History:   Procedure Laterality Date     BIOPSY      right breast--neg     BIOPSY BREAST NEEDLE LOCALIZATION  2/13/2014    Procedure: BIOPSY BREAST NEEDLE LOCALIZATION;  WIRE LOCALIZED SEGMENTAL RESECTION, RIGHT BREAST;  Surgeon: Shanika Beaver MD;  Location: [facility]     CHOLECYSTECTOMY  2010     COLONOSCOPY  2009     COLONOSCOPY  11/12/2013     DILATION AND CURETTAGE, OPERATIVE HYSTEROSCOPY, COMBINED  03/2017    [facility]     ORTHOPEDIC SURGERY      heel surgery     ORTHOPEDIC SURGERY      rt knee meniiscus tear       Social History     Tobacco Use     Smoking status: Former Smoker     Packs/day: 0.10     Years: 30.00     Pack years: 3.00     Types: Cigarettes      Start date: 8/15/1974     Last attempt to quit: 3/3/2013     Years since quittin.7     Smokeless tobacco: Never Used   Substance Use Topics     Alcohol use: Yes     Family History   Problem Relation Age of Onset     C.A.D. Father      Coronary Artery Disease Father      Cerebrovascular Disease Father      Prostate Cancer Father      Hypertension Mother      Cerebrovascular Disease Mother      Breast Cancer Mother 55         age 71 left mastectomy     Thyroid Disease Other      Thyroid Disease Sister      Diabetes No family hx of      Asthma No family hx of      Hyperlipidemia No family hx of      Colon Cancer No family hx of      Other Cancer No family hx of      Anesthesia Reaction No family hx of      Genetic Disorder No family hx of          Current Outpatient Medications   Medication Sig Dispense Refill     acetaminophen (TYLENOL 8 HOUR ARTHRITIS PAIN) 650 MG CR tablet Take 650 mg by mouth every 8 hours as needed for mild pain or fever       albuterol (2.5 MG/3ML) 0.083% neb solution Take 1 vial (2.5 mg) by nebulization every 4 hours as needed for shortness of breath / dyspnea or wheezing 1 Box 3     albuterol (VENTOLIN HFA) 108 (90 Base) MCG/ACT inhaler Inhale 2 Puffs into the lungs every four hours as needed for shortness of breath / dyspnea or wheezing 18 g 1     ASPIRIN PO Take 81 mg by mouth daily       calcium-vitamin D (CALTRATE) 600-400 MG-UNIT per tablet Take 1 tablet by mouth 2 times daily       cyanocobalamin (CVS VITAMIN  B12) 1000 MCG TABS Take 1,000 mcg by mouth daily 30 tablet      doxylamine (UNISOM) 25 MG TABS tablet Take 25 mg by mouth nightly as needed 1-2 at HS as needed       escitalopram (LEXAPRO) 20 MG tablet Take 1 tablet (20 mg) by mouth daily 90 tablet 3     lisinopril (PRINIVIL/ZESTRIL) 20 MG tablet Take 1.5 tablets (30 mg) by mouth daily 135 tablet 1     Multiple Vitamin (DAILY MULTIVITAMIN PO) Take one tablet by oral route every day with food       nystatin (MYCOSTATIN)  761891 UNIT/GM POWD Apply 100,000 g topically 2 times daily as needed 60 g 3     Omega-3 Fatty Acids (OMEGA 3 PO) Take 1,000 mg by mouth daily        traMADol (ULTRAM) 50 MG tablet Take 1 tablet (50 mg) by mouth every 6 hours as needed for severe pain 20 tablet 0     UNABLE TO FIND MEDICATION NAME: phedermine 15mg take 1/2 tablet once daily       valACYclovir (VALTREX) 1000 mg tablet Take 1 tablet (1,000 mg) by mouth 3 times daily for 7 days 21 tablet 0     VITAMIN D, CHOLECALCIFEROL, PO Take 5,000 Units by mouth daily        Allergies   Allergen Reactions     Adhesive Tape      Steri strips left scar.     Latex      Possible allergy.     BP Readings from Last 3 Encounters:   11/14/19 138/85   10/11/19 (!) 150/90   08/14/19 (!) 164/72    Wt Readings from Last 3 Encounters:   11/14/19 120.2 kg (265 lb)   10/11/19 122.2 kg (269 lb 8 oz)   08/14/19 122 kg (269 lb)                      Reviewed and updated as needed this visit by Provider         Review of Systems   ROS COMP: Constitutional, HEENT, cardiovascular, pulmonary, gi and gu systems are negative, except as otherwise noted.      Objective    /85 (BP Location: Left arm, Patient Position: Chair, Cuff Size: Adult Large)   Pulse 92   Temp 98.3  F (36.8  C) (Tympanic)   Wt 120.2 kg (265 lb)   SpO2 95%   BMI 42.77 kg/m    Body mass index is 42.77 kg/m .  Physical Exam   GENERAL: Alert and no distress  MS: no gross musculoskeletal defects noted, no edema  SKIN: Vesicular rash in left T6 dermatome    PSYCH: mentation appears normal, affect normal/bright    Diagnostic Test Results:    No results found for any visits on 11/14/19.  Assessment & Plan   Problem List Items Addressed This Visit     None      Visit Diagnoses     Herpes zoster without complication    -  Primary    Relevant Medications    valACYclovir (VALTREX) 1000 mg tablet TID x 7 days    traMADol (ULTRAM) 50 MG tablet             Ricardo Scruggs,   [facility]  [facility]

## 2019-11-14 NOTE — NURSING NOTE
"Chief Complaint   Patient presents with     Rash       Initial /85 (BP Location: Left arm, Patient Position: Chair, Cuff Size: Adult Large)   Pulse 92   Temp 98.3  F (36.8  C) (Tympanic)   Wt 120.2 kg (265 lb)   SpO2 95%   BMI 42.77 kg/m   Estimated body mass index is 42.77 kg/m  as calculated from the following:    Height as of 8/14/19: 1.676 m (5' 6\").    Weight as of this encounter: 120.2 kg (265 lb).  Medication Reconciliation: complete  JADE SO LPN  "

## 2019-11-14 NOTE — LETTER
November 14, 2019      Vandana Jaeger  [address] [place] [address]        To Whom It May Concern:    Vandana Jaeger  was seen on 11/1419.  Please excuse her until 11/18/19 for medical reasons.        Sincerely,        Ricardo Scruggs, DO

## 2019-11-22 DIAGNOSIS — I10 BENIGN ESSENTIAL HYPERTENSION: ICD-10-CM

## 2019-11-22 RX ORDER — LISINOPRIL 20 MG/1
30 TABLET ORAL DAILY
Qty: 135 TABLET | Refills: 0 | Status: SHIPPED | OUTPATIENT
Start: 2019-11-22 | End: 2020-02-21

## 2019-12-31 ENCOUNTER — TELEPHONE (OUTPATIENT)
Dept: FAMILY MEDICINE | Facility: OTHER | Age: 59
End: 2019-12-31

## 2020-01-20 ENCOUNTER — ANCILLARY PROCEDURE (OUTPATIENT)
Dept: MAMMOGRAPHY | Facility: OTHER | Age: 60
End: 2020-01-20
Attending: NURSE PRACTITIONER
Payer: COMMERCIAL

## 2020-01-20 DIAGNOSIS — Z12.31 VISIT FOR SCREENING MAMMOGRAM: ICD-10-CM

## 2020-01-20 PROCEDURE — 77067 SCR MAMMO BI INCL CAD: CPT | Mod: TC

## 2020-01-20 PROCEDURE — 77063 BREAST TOMOSYNTHESIS BI: CPT | Mod: TC

## 2020-01-22 ENCOUNTER — TELEPHONE (OUTPATIENT)
Dept: FAMILY MEDICINE | Facility: OTHER | Age: 60
End: 2020-01-22

## 2020-01-22 NOTE — TELEPHONE ENCOUNTER
Patient calling today upset that she has not received results from her mammogram.  Informed that they do want her to have a follow up mammogram.  Patient advised that referral is pending and she should receive a phone call with in next 48 hours.

## 2020-01-23 NOTE — TELEPHONE ENCOUNTER
Spoke with Uma this morning and advised her that [facility] has attempted [facility] times trying to get hold of Vandana. Asked if there is another phone number she could be reached at.I had tried calling the [facility] to get a hold of her as well with failed attempt to get through to her department. I will keep trying.

## 2020-01-23 NOTE — TELEPHONE ENCOUNTER
Vandana called approximately 1400 today. I apologized for the anxiety of the trying to get a hold of each other. I explained what and why the diagnostic is needed. I got her on the schedule for tomorrow @ 1130. I did offer her this afternoon but she was unable to. All questions were answered and she seemed more calm after the conversation.

## 2020-01-24 ENCOUNTER — ANCILLARY PROCEDURE (OUTPATIENT)
Dept: MAMMOGRAPHY | Facility: OTHER | Age: 60
End: 2020-01-24
Attending: NURSE PRACTITIONER
Payer: COMMERCIAL

## 2020-01-24 DIAGNOSIS — R92.8 ABNORMAL MAMMOGRAM OF RIGHT BREAST: ICD-10-CM

## 2020-01-24 PROCEDURE — G0279 TOMOSYNTHESIS, MAMMO: HCPCS | Mod: TC

## 2020-01-24 PROCEDURE — 77065 DX MAMMO INCL CAD UNI: CPT | Mod: TC

## 2020-02-19 DIAGNOSIS — F33.8 SEASONAL AFFECTIVE DISORDER (H): ICD-10-CM

## 2020-02-19 DIAGNOSIS — I10 BENIGN ESSENTIAL HYPERTENSION: ICD-10-CM

## 2020-02-19 NOTE — TELEPHONE ENCOUNTER
Lexapro      Last Written Prescription Date:  02/14/2019  Last Fill Quantity: 90,   # refills: 3  Last Office Visit: 11/14/2019  Future Office visit:       Routing refill request to provider for review/approval because:  Drug not on the [facility], [facility] or [facility] refill protocol or controlled substance

## 2020-02-21 RX ORDER — ESCITALOPRAM OXALATE 20 MG/1
20 TABLET ORAL DAILY
Qty: 90 TABLET | Refills: 0 | Status: SHIPPED | OUTPATIENT
Start: 2020-02-21 | End: 2020-06-10

## 2020-02-21 RX ORDER — LISINOPRIL 20 MG/1
TABLET ORAL
Qty: 135 TABLET | Refills: 0 | Status: SHIPPED | OUTPATIENT
Start: 2020-02-21 | End: 2020-05-18

## 2020-06-10 DIAGNOSIS — F33.8 SEASONAL AFFECTIVE DISORDER (H): ICD-10-CM

## 2020-06-10 RX ORDER — ESCITALOPRAM OXALATE 20 MG/1
TABLET ORAL
Qty: 90 TABLET | Refills: 0 | Status: SHIPPED | OUTPATIENT
Start: 2020-06-10 | End: 2020-08-13

## 2020-06-10 NOTE — TELEPHONE ENCOUNTER
Failed protocol due to    PHQ-9 score less than 5 in past 6 months Protocol Details    Recent (6 mo) or future (30 days) visit within the authorizing provider's specialty      PHQ-9 score:    PHQ 10/11/2019   PHQ-9 Total Score 3   Q9: Thoughts of better off dead/self-harm past 2 weeks Not at all

## 2020-06-10 NOTE — TELEPHONE ENCOUNTER
lexapro      Last Written Prescription Date:  2/21/2020  Last Fill Quantity: 90,   # refills: 0  Last Office Visit: 11/14/2019

## 2020-08-12 DIAGNOSIS — I10 BENIGN ESSENTIAL HYPERTENSION: ICD-10-CM

## 2020-08-12 DIAGNOSIS — F33.8 SEASONAL AFFECTIVE DISORDER (H): ICD-10-CM

## 2020-08-13 RX ORDER — ESCITALOPRAM OXALATE 20 MG/1
TABLET ORAL
Qty: 90 TABLET | Refills: 0 | Status: SHIPPED | OUTPATIENT
Start: 2020-08-13 | End: 2020-12-14

## 2020-08-13 RX ORDER — LISINOPRIL 20 MG/1
TABLET ORAL
Qty: 135 TABLET | Refills: 0 | Status: SHIPPED | OUTPATIENT
Start: 2020-08-13 | End: 2020-11-17

## 2020-08-13 NOTE — TELEPHONE ENCOUNTER
lexapro      Last Written Prescription Date:  6/10/2020  Last Fill Quantity: 90 tab,   # refills: 0  Last Office Visit: 11/14/2019  Future Office visit:    Next 5 appointments (look out 90 days)    Aug 14, 2020  1:30 PM CDT  (Arrive by 1:15 PM)  Return Visit with Mtailde Patrick NP  [facility] ([facility] ) [address] [facility] [address]  [phone]           Routing refill request to provider for review/approval because:      Lisinopril      Last Written Prescription Date:  5/18/2020  Last Fill Quantity: 135 tab,   # refills: 0  Last Office Visit: 11/14/2019  Future Office visit:    Next 5 appointments (look out 90 days)    Aug 14, 2020  1:30 PM CDT  (Arrive by 1:15 PM)  Return Visit with Matilde Patrick NP  [facility] [address] [facility] [address]  [phone]           Routing refill request to provider for review/approval because:

## 2020-09-17 ENCOUNTER — TELEPHONE (OUTPATIENT)
Dept: ONCOLOGY | Facility: OTHER | Age: 60
End: 2020-09-17

## 2020-09-17 NOTE — TELEPHONE ENCOUNTER
Patient has been called 4 different  times to reschedule her follow up with Birdie in Oncology. Message left for patient to call us.  Darling Bain LPN

## 2020-09-29 NOTE — TELEPHONE ENCOUNTER
Several more attempts to call and reschedule patient, messages left.  Will reschedule cancelled appointment when return call received.

## 2020-11-17 DIAGNOSIS — I10 BENIGN ESSENTIAL HYPERTENSION: ICD-10-CM

## 2020-11-17 RX ORDER — LISINOPRIL 20 MG/1
TABLET ORAL
Qty: 135 TABLET | Refills: 0 | Status: SHIPPED | OUTPATIENT
Start: 2020-11-17 | End: 2021-01-11

## 2020-11-17 NOTE — TELEPHONE ENCOUNTER
ZESTRIL      Last Written Prescription Date:  8-  Last Fill Quantity: 135,   # refills: 0  Last Office Visit: 10-  Future Office visit:       Routing refill request to provider for review/approval because:  Medication is reported/historical

## 2020-12-07 DIAGNOSIS — J98.9 REACTIVE AIRWAY DISEASE THAT IS NOT ASTHMA: ICD-10-CM

## 2020-12-08 RX ORDER — ALBUTEROL SULFATE 90 UG/1
AEROSOL, METERED RESPIRATORY (INHALATION)
Qty: 8.5 G | Refills: 1 | Status: SHIPPED | OUTPATIENT
Start: 2020-12-08 | End: 2022-08-31

## 2020-12-08 NOTE — TELEPHONE ENCOUNTER
Albuterol inhaler     Pt reports having covid now and is really needing her inhaler. Pended.     Last Written Prescription Date:  7-9-19  Last Fill Quantity: 18g,   # refills: 1  Last Office Visit: 10-11-19  Future Office visit:    Next 5 appointments (look out 90 days)    Cornelio 15, 2021 12:45 PM  (Arrive by 12:30 PM)  Return Visit with Matilde Patrick NP  [facility] ([facility] ) [address] [place] [id number]  [phone]

## 2020-12-14 DIAGNOSIS — F33.8 SEASONAL AFFECTIVE DISORDER (H): ICD-10-CM

## 2020-12-14 RX ORDER — ESCITALOPRAM OXALATE 20 MG/1
TABLET ORAL
Qty: 90 TABLET | Refills: 0 | Status: SHIPPED | OUTPATIENT
Start: 2020-12-14 | End: 2021-03-16

## 2020-12-14 NOTE — TELEPHONE ENCOUNTER
escitalopram (LEXAPRO) 20 MG tablet      Last Written Prescription Date:  8/13/20  Last Fill Quantity: 90,   # refills: 0  Last Office Visit: 10/11/19  Future Office visit:    Next 5 appointments (look out 90 days)    Cornelio 15, 2021 12:45 PM  (Arrive by 12:30 PM)  Return Visit with Matilde Patrick NP  [facility] ([facility] ) [address] [place] [address] AVE  [facility] [address]  [phone]           Routing refill request to provider for review/approval

## 2021-01-06 ENCOUNTER — TELEPHONE (OUTPATIENT)
Dept: FAMILY MEDICINE | Facility: OTHER | Age: 61
End: 2021-01-06

## 2021-01-06 NOTE — TELEPHONE ENCOUNTER
Please check in with her after lunch and see if she went in - how she is doing    [facility]  [phone]

## 2021-01-06 NOTE — TELEPHONE ENCOUNTER
"Called patient back, advised patient to go to ER for evaluation.  Patient concerned about going to the ER, advised her that it is important to be seen in the ER for further evaluation due to the elevated BP reading. Patient responded with \"I'll think about it\" and hung up the phone.   "

## 2021-01-06 NOTE — TELEPHONE ENCOUNTER
Called patient and she did not go to the ER. BP did come down to 172/92 pt states. Visit scheduled with PCP for 1/11/2021. Advised to go to ER with worsening symptoms

## 2021-01-06 NOTE — TELEPHONE ENCOUNTER
10:59 AM    Reason for Call: Phone Call    Description: pt called and stated that her is  202/93 BP, and causing high anxiety, please call back pt. Pt hung up before could send her to triage.     Was an appointment offered for this call? No  If yes : Appointment type              Date    Preferred method for responding to this message: Telephone Call  What is your phone number ?    If we cannot reach you directly, may we leave a detailed response at the number you provided? Yes    Can this message wait until your PCP/provider returns, if available today? Jahaira Mcmillan

## 2021-01-11 ENCOUNTER — OFFICE VISIT (OUTPATIENT)
Dept: FAMILY MEDICINE | Facility: OTHER | Age: 61
End: 2021-01-11
Attending: NURSE PRACTITIONER
Payer: COMMERCIAL

## 2021-01-11 ENCOUNTER — ANCILLARY PROCEDURE (OUTPATIENT)
Dept: GENERAL RADIOLOGY | Facility: OTHER | Age: 61
End: 2021-01-11
Attending: NURSE PRACTITIONER
Payer: COMMERCIAL

## 2021-01-11 VITALS
DIASTOLIC BLOOD PRESSURE: 108 MMHG | SYSTOLIC BLOOD PRESSURE: 188 MMHG | HEART RATE: 103 BPM | TEMPERATURE: 98.1 F | HEIGHT: 66 IN | BODY MASS INDEX: 42.91 KG/M2 | OXYGEN SATURATION: 96 % | WEIGHT: 267 LBS

## 2021-01-11 DIAGNOSIS — D05.11 DUCTAL CARCINOMA IN SITU OF RIGHT BREAST: Primary | ICD-10-CM

## 2021-01-11 DIAGNOSIS — E55.9 VITAMIN D DEFICIENCY: ICD-10-CM

## 2021-01-11 DIAGNOSIS — D05.01 LOBULAR CARCINOMA IN SITU OF RIGHT BREAST: ICD-10-CM

## 2021-01-11 DIAGNOSIS — R06.02 SOB (SHORTNESS OF BREATH): ICD-10-CM

## 2021-01-11 DIAGNOSIS — R00.0 RAPID HEART RATE: ICD-10-CM

## 2021-01-11 DIAGNOSIS — J98.9 REACTIVE AIRWAY DISEASE THAT IS NOT ASTHMA: Primary | ICD-10-CM

## 2021-01-11 DIAGNOSIS — I10 BENIGN ESSENTIAL HYPERTENSION: ICD-10-CM

## 2021-01-11 DIAGNOSIS — Z12.31 ENCOUNTER FOR SCREENING MAMMOGRAM FOR MALIGNANT NEOPLASM OF BREAST: ICD-10-CM

## 2021-01-11 DIAGNOSIS — F33.0 MILD EPISODE OF RECURRENT MAJOR DEPRESSIVE DISORDER (H): ICD-10-CM

## 2021-01-11 DIAGNOSIS — N39.3 URINARY, INCONTINENCE, STRESS FEMALE: ICD-10-CM

## 2021-01-11 PROBLEM — Z86.16 HISTORY OF COVID-19: Status: ACTIVE | Noted: 2021-01-11

## 2021-01-11 LAB
ALBUMIN UR-MCNC: NEGATIVE MG/DL
APPEARANCE UR: CLEAR
BASOPHILS # BLD AUTO: 0 10E9/L (ref 0–0.2)
BASOPHILS NFR BLD AUTO: 0.3 %
BILIRUB UR QL STRIP: NEGATIVE
COLOR UR AUTO: YELLOW
DIFFERENTIAL METHOD BLD: NORMAL
EOSINOPHIL # BLD AUTO: 0.1 10E9/L (ref 0–0.7)
EOSINOPHIL NFR BLD AUTO: 1.6 %
ERYTHROCYTE [DISTWIDTH] IN BLOOD BY AUTOMATED COUNT: 13.4 % (ref 10–15)
GLUCOSE UR STRIP-MCNC: NEGATIVE MG/DL
HCT VFR BLD AUTO: 39.4 % (ref 35–47)
HGB BLD-MCNC: 12.9 G/DL (ref 11.7–15.7)
HGB UR QL STRIP: NEGATIVE
KETONES UR STRIP-MCNC: NEGATIVE MG/DL
LEUKOCYTE ESTERASE UR QL STRIP: NEGATIVE
LYMPHOCYTES # BLD AUTO: 2.5 10E9/L (ref 0.8–5.3)
LYMPHOCYTES NFR BLD AUTO: 33.2 %
MCH RBC QN AUTO: 30 PG (ref 26.5–33)
MCHC RBC AUTO-ENTMCNC: 32.7 G/DL (ref 31.5–36.5)
MCV RBC AUTO: 92 FL (ref 78–100)
MONOCYTES # BLD AUTO: 0.7 10E9/L (ref 0–1.3)
MONOCYTES NFR BLD AUTO: 8.8 %
NEUTROPHILS # BLD AUTO: 4.2 10E9/L (ref 1.6–8.3)
NEUTROPHILS NFR BLD AUTO: 56.1 %
NITRATE UR QL: NEGATIVE
PH UR STRIP: 6 PH (ref 5–7)
PLATELET # BLD AUTO: 357 10E9/L (ref 150–450)
RBC # BLD AUTO: 4.3 10E12/L (ref 3.8–5.2)
SOURCE: NORMAL
SP GR UR STRIP: 1.02 (ref 1–1.03)
UROBILINOGEN UR STRIP-ACNC: 0.2 EU/DL (ref 0.2–1)
WBC # BLD AUTO: 7.4 10E9/L (ref 4–11)

## 2021-01-11 PROCEDURE — 81003 URINALYSIS AUTO W/O SCOPE: CPT | Performed by: NURSE PRACTITIONER

## 2021-01-11 PROCEDURE — 36415 COLL VENOUS BLD VENIPUNCTURE: CPT | Performed by: NURSE PRACTITIONER

## 2021-01-11 PROCEDURE — 80050 GENERAL HEALTH PANEL: CPT | Performed by: NURSE PRACTITIONER

## 2021-01-11 PROCEDURE — 82306 VITAMIN D 25 HYDROXY: CPT | Performed by: NURSE PRACTITIONER

## 2021-01-11 PROCEDURE — 71046 X-RAY EXAM CHEST 2 VIEWS: CPT | Mod: TC | Performed by: RADIOLOGY

## 2021-01-11 PROCEDURE — 99215 OFFICE O/P EST HI 40 MIN: CPT | Mod: 25 | Performed by: NURSE PRACTITIONER

## 2021-01-11 PROCEDURE — 93000 ELECTROCARDIOGRAM COMPLETE: CPT | Performed by: INTERNAL MEDICINE

## 2021-01-11 RX ORDER — TOLTERODINE 4 MG/1
4 CAPSULE, EXTENDED RELEASE ORAL DAILY
Qty: 90 CAPSULE | Refills: 1 | Status: SHIPPED | OUTPATIENT
Start: 2021-01-11 | End: 2021-04-06

## 2021-01-11 RX ORDER — LISINOPRIL 40 MG/1
40 TABLET ORAL DAILY
Qty: 90 TABLET | Refills: 1 | Status: SHIPPED | OUTPATIENT
Start: 2021-01-11 | End: 2021-06-08

## 2021-01-11 RX ORDER — METOPROLOL SUCCINATE 25 MG/1
25 TABLET, EXTENDED RELEASE ORAL DAILY
Qty: 90 TABLET | Refills: 1 | Status: SHIPPED | OUTPATIENT
Start: 2021-01-11 | End: 2021-02-02

## 2021-01-11 RX ORDER — PHENTERMINE HYDROCHLORIDE 37.5 MG/1
18.75 TABLET ORAL
COMMUNITY
Start: 2020-12-29 | End: 2021-07-14

## 2021-01-11 ASSESSMENT — ASTHMA QUESTIONNAIRES
ACT_TOTALSCORE: 15
QUESTION_4 LAST FOUR WEEKS HOW OFTEN HAVE YOU USED YOUR RESCUE INHALER OR NEBULIZER MEDICATION (SUCH AS ALBUTEROL): ONE OR TWO TIMES PER DAY
QUESTION_3 LAST FOUR WEEKS HOW OFTEN DID YOUR ASTHMA SYMPTOMS (WHEEZING, COUGHING, SHORTNESS OF BREATH, CHEST TIGHTNESS OR PAIN) WAKE YOU UP AT NIGHT OR EARLIER THAN USUAL IN THE MORNING: ONCE OR TWICE
QUESTION_2 LAST FOUR WEEKS HOW OFTEN HAVE YOU HAD SHORTNESS OF BREATH: THREE TO SIX TIMES A WEEK
QUESTION_1 LAST FOUR WEEKS HOW MUCH OF THE TIME DID YOUR ASTHMA KEEP YOU FROM GETTING AS MUCH DONE AT WORK, SCHOOL OR AT HOME: SOME OF THE TIME
QUESTION_5 LAST FOUR WEEKS HOW WOULD YOU RATE YOUR ASTHMA CONTROL: SOMEWHAT CONTROLLED

## 2021-01-11 ASSESSMENT — ANXIETY QUESTIONNAIRES
1. FEELING NERVOUS, ANXIOUS, OR ON EDGE: NEARLY EVERY DAY
7. FEELING AFRAID AS IF SOMETHING AWFUL MIGHT HAPPEN: SEVERAL DAYS
4. TROUBLE RELAXING: SEVERAL DAYS
6. BECOMING EASILY ANNOYED OR IRRITABLE: MORE THAN HALF THE DAYS
5. BEING SO RESTLESS THAT IT IS HARD TO SIT STILL: NOT AT ALL
3. WORRYING TOO MUCH ABOUT DIFFERENT THINGS: NEARLY EVERY DAY
GAD7 TOTAL SCORE: 13
2. NOT BEING ABLE TO STOP OR CONTROL WORRYING: NEARLY EVERY DAY

## 2021-01-11 ASSESSMENT — MIFFLIN-ST. JEOR: SCORE: 1792.85

## 2021-01-11 ASSESSMENT — PAIN SCALES - GENERAL: PAINLEVEL: NO PAIN (0)

## 2021-01-11 ASSESSMENT — PATIENT HEALTH QUESTIONNAIRE - PHQ9: SUM OF ALL RESPONSES TO PHQ QUESTIONS 1-9: 9

## 2021-01-11 NOTE — LETTER
January 15, 2021      Vandana Jaeger  [address] [place] [address]        Dear MsCholo,    We are writing to inform you of your test results.    Your test results fall within the expected range(s) or remain unchanged from previous results.  Please continue with current treatment plan.    Resulted Orders   TSH with free T4 reflex   Result Value Ref Range    TSH 2.90 0.40 - 4.00 mU/L   Vitamin D Deficiency   Result Value Ref Range    Vitamin D Deficiency screening 58 20 - 75 ug/L      Comment:      Season, race, dietary intake, and treatment affect the concentration of   25-hydroxy-Vitamin D. Values may decrease during winter months and increase   during summer months. Values 20-29 ug/L may indicate Vitamin D insufficiency   and values <20 ug/L may indicate Vitamin D deficiency.  Vitamin D determination is routinely performed by an immunoassay specific for   25 hydroxyvitamin D3.  If an individual is on vitamin D2 (ergocalciferol)   supplementation, please specify 25 OH vitamin D2 and D3 level determination by   LCMSMS test [id number].     *UA reflex to Microscopic and Culture - [facility]   Result Value Ref Range    Color Urine Yellow     Appearance Urine Clear     Glucose Urine Negative NEG^Negative mg/dL    Bilirubin Urine Negative NEG^Negative    Ketones Urine Negative NEG^Negative mg/dL    Specific Gravity Urine 1.025 1.003 - 1.035    Blood Urine Negative NEG^Negative    pH Urine 6.0 5.0 - 7.0 pH    Protein Albumin Urine Negative NEG^Negative mg/dL    Urobilinogen Urine 0.2 0.2 - 1.0 EU/dL    Nitrite Urine Negative NEG^Negative    Leukocyte Esterase Urine Negative NEG^Negative    Source Midstream Urine    CBC with platelets differential   Result Value Ref Range    WBC 7.4 4.0 - 11.0 10e9/L    RBC Count 4.30 3.8 - 5.2 10e12/L    Hemoglobin 12.9 11.7 - 15.7 g/dL    Hematocrit 39.4 35.0 - 47.0 %    MCV 92 78 - 100 fl    MCH 30.0 26.5 - 33.0 pg    MCHC 32.7 31.5 - 36.5 g/dL    RDW 13.4 10.0 - 15.0 %     Platelet Count 357 150 - 450 10e9/L    % Neutrophils 56.1 %    % Lymphocytes 33.2 %    % Monocytes 8.8 %    % Eosinophils 1.6 %    % Basophils 0.3 %    Absolute Neutrophil 4.2 1.6 - 8.3 10e9/L    Absolute Lymphocytes 2.5 0.8 - 5.3 10e9/L    Absolute Monocytes 0.7 0.0 - 1.3 10e9/L    Absolute Eosinophils 0.1 0.0 - 0.7 10e9/L    Absolute Basophils 0.0 0.0 - 0.2 10e9/L    Diff Method Automated Method    Comprehensive metabolic panel   Result Value Ref Range    Sodium 139 133 - 144 mmol/L    Potassium 3.7 3.4 - 5.3 mmol/L    Chloride 105 94 - 109 mmol/L    Carbon Dioxide 26 20 - 32 mmol/L    Anion Gap 8 3 - 14 mmol/L    Glucose 98 70 - 99 mg/dL      Comment:      Non Fasting    Urea Nitrogen 24 7 - 30 mg/dL    Creatinine 0.61 0.52 - 1.04 mg/dL    GFR Estimate >90 >60 mL/min/[1.73_m2]      Comment:      Non  GFR Calc  Starting 12/18/2018, serum creatinine based estimated GFR (eGFR) will be   calculated using the Chronic Kidney Disease Epidemiology Collaboration   (CKD-EPI) equation.      GFR Estimate If Black >90 >60 mL/min/[1.73_m2]      Comment:       GFR Calc  Starting 12/18/2018, serum creatinine based estimated GFR (eGFR) will be   calculated using the Chronic Kidney Disease Epidemiology Collaboration   (CKD-EPI) equation.      Calcium 9.3 8.5 - 10.1 mg/dL    Bilirubin Total 0.3 0.2 - 1.3 mg/dL    Albumin 4.2 3.4 - 5.0 g/dL    Protein Total 8.3 6.8 - 8.8 g/dL    Alkaline Phosphatase 63 40 - 150 U/L    ALT 35 0 - 50 U/L    AST 17 0 - 45 U/L       If you have any questions or concerns, please call the clinic at the number listed above.       Sincerely,      Jackie Hitchcock, CNP

## 2021-01-11 NOTE — NURSING NOTE
"Chief Complaint   Patient presents with     Hypertension     Depression       Initial BP (!) 188/108 (BP Location: Left arm, Patient Position: Chair, Cuff Size: Adult Large)   Pulse 103   Temp 98.1  F (36.7  C) (Tympanic)   Ht 1.676 m (5' 6\")   Wt 121.1 kg (267 lb)   SpO2 96%   BMI 43.09 kg/m   Estimated body mass index is 43.09 kg/m  as calculated from the following:    Height as of this encounter: 1.676 m (5' 6\").    Weight as of this encounter: 121.1 kg (267 lb).  Medication Reconciliation: complete  Sosa Springer LPN    "

## 2021-01-11 NOTE — PROGRESS NOTES
Vandana is a 60 year old who presents to clinic today for the following health issues       Hypertension Follow-up    Do you check your blood pressure regularly outside of the clinic? Yes     Are you following a low salt diet? No    Are your blood pressures ever more than 140 on the top number (systolic) OR more   than 90 on the bottom number (diastolic), for example 140/90? Yes      Depression Followup    How are you doing with your depression since your last visit? No change    Are you having other symptoms that might be associated with depression? Yes:  anxiousness, palpitations     Have you had a significant life event?  No     Are you feeling anxious or having panic attacks?   No    Do you have any concerns with your use of alcohol or other drugs? No       COVID 19  Diagnosed in November  Continues with cough, sputum, occasional SOB      Racing Heart Rate - Feels her heart races, with activity - comes and goes.  No chest pain, does have SOB which is chronic      Urinary frequency - some incontinence daily, no dysuria      Social History     Tobacco Use     Smoking status: Former Smoker     Packs/day: 0.10     Years: 30.00     Pack years: 3.00     Types: Cigarettes     Start date: 8/15/1974     Quit date: 3/3/2013     Years since quittin.8     Smokeless tobacco: Never Used   Substance Use Topics     Alcohol use: Yes     Drug use: No       PHQ 10/11/2019 10/11/2019 2021   PHQ-9 Total Score 3 3 9   Q9: Thoughts of better off dead/self-harm past 2 weeks Not at all Not at all Not at all       REUBEN-7 SCORE 10/11/2019 10/11/2019 2021   Total Score 1 1 13       Last PHQ-9 2021   1.  Little interest or pleasure in doing things 1   2.  Feeling down, depressed, or hopeless 2   3.  Trouble falling or staying asleep, or sleeping too much 3   4.  Feeling tired or having little energy 2   5.  Poor appetite or overeating 0   6.  Feeling bad about yourself 1   7.  Trouble concentrating 0   8.  Moving slowly  or restless 0   Q9: Thoughts of better off dead/self-harm past 2 weeks 0   PHQ-9 Total Score 9   Difficulty at work, home, or with people -       REUBEN-7  1/11/2021   1. Feeling nervous, anxious, or on edge 3   2. Not being able to stop or control worrying 3   3. Worrying too much about different things 3   4. Trouble relaxing 1   5. Being so restless that it is hard to sit still 0   6. Becoming easily annoyed or irritable 2   7. Feeling afraid, as if something awful might happen 1   REUBEN-7 Total Score 13   If you checked any problems, how difficult have they made it for you to do your work, take care of things at home, or get along with other people? -       Suicide Assessment Five-step Evaluation and Treatment (SAFE-T)          How many servings of fruits and vegetables do you eat daily?  2-3    On average, how many sweetened beverages do you drink each day (Examples: soda, juice, sweet tea, etc.  Do NOT count diet or artificially sweetened beverages)?   0    How many days per week do you exercise enough to make your heart beat faster? 3 or less    How many minutes a day do you exercise enough to make your heart beat faster? 9 or less    How many days per week do you miss taking your medication? 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     Ductal carcinoma in situ (DCIS) of right breast     Reactive airway disease that is not asthma     Contact dermatitis and other eczema, due to unspecified cause     Essential hypertension     Vitamin D deficiency     ACP (advance care planning)     Use of tamoxifen (Nolvadex)     Morbid obesity with BMI of 50.0-59.9, adult (H)     Recurrent major depressive disorder (H)     History of COVID-19     Urinary, incontinence, stress female     Past Surgical History:   Procedure Laterality Date     BIOPSY      right breast--neg     BIOPSY BREAST NEEDLE LOCALIZATION  2/13/2014    Procedure: BIOPSY BREAST NEEDLE LOCALIZATION;   WIRE LOCALIZED SEGMENTAL RESECTION, RIGHT BREAST;  Surgeon: Shanika Beaver MD;  Location: [facility]     CHOLECYSTECTOMY       COLONOSCOPY       COLONOSCOPY  2013     DILATION AND CURETTAGE, OPERATIVE HYSTEROSCOPY, COMBINED  2017    [facility]     ORTHOPEDIC SURGERY      heel surgery     ORTHOPEDIC SURGERY      rt knee meniiscus tear       Social History     Tobacco Use     Smoking status: Former Smoker     Packs/day: 0.10     Years: 30.00     Pack years: 3.00     Types: Cigarettes     Start date: 8/15/1974     Quit date: 3/3/2013     Years since quittin.8     Smokeless tobacco: Never Used   Substance Use Topics     Alcohol use: Yes     Family History   Problem Relation Age of Onset     C.A.D. Father      Coronary Artery Disease Father      Cerebrovascular Disease Father      Prostate Cancer Father      Hypertension Mother      Cerebrovascular Disease Mother      Breast Cancer Mother 55         age 71 left mastectomy     Thyroid Disease Other      Thyroid Disease Sister      Diabetes No family hx of      Asthma No family hx of      Hyperlipidemia No family hx of      Colon Cancer No family hx of      Other Cancer No family hx of      Anesthesia Reaction No family hx of      Genetic Disorder No family hx of            Current Outpatient Medications   Medication Sig Dispense Refill     acetaminophen (TYLENOL 8 HOUR ARTHRITIS PAIN) 650 MG CR tablet Take 650 mg by mouth every 8 hours as needed for mild pain or fever       albuterol (2.5 MG/3ML) 0.083% neb solution Take 1 vial (2.5 mg) by nebulization every 4 hours as needed for shortness of breath / dyspnea or wheezing 1 Box 3     albuterol (PROAIR HFA) 108 (90 Base) MCG/ACT inhaler Inhale 2 Puffs into the lungs every four hours as needed for shortness of breath / dyspnea or wheezing 8.5 g 1     ASPIRIN PO Take 81 mg by mouth daily       calcium-vitamin D (CALTRATE) 600-400 MG-UNIT per tablet Take 1 tablet by mouth 2 times daily       cyanocobalamin  ([employer] VITAMIN  B12) 1000 MCG TABS Take 1,000 mcg by mouth daily 30 tablet      doxylamine (UNISOM) 25 MG TABS tablet Take 25 mg by mouth nightly as needed 1-2 at HS as needed       escitalopram (LEXAPRO) 20 MG tablet Take 1 Tab by mouth one time a day. 90 tablet 0     lisinopril (ZESTRIL) 40 MG tablet Take 1 tablet (40 mg) by mouth daily 90 tablet 1     metoprolol succinate ER (TOPROL-XL) 25 MG 24 hr tablet Take 1 tablet (25 mg) by mouth daily 90 tablet 1     Multiple Vitamin (DAILY MULTIVITAMIN PO) Take one tablet by oral route every day with food       nystatin (MYCOSTATIN) 593687 UNIT/GM POWD Apply 100,000 g topically 2 times daily as needed 60 g 3     Omega-3 Fatty Acids (OMEGA 3 PO) Take 1,000 mg by mouth daily        phentermine (ADIPEX-P) 37.5 MG tablet Take 18.75 mg by mouth       tolterodine ER (DETROL LA) 4 MG 24 hr capsule Take 1 capsule (4 mg) by mouth daily 90 capsule 1     UNABLE TO FIND MEDICATION NAME: phedermine 15mg take 1/2 tablet once daily       VITAMIN D, CHOLECALCIFEROL, PO Take 5,000 Units by mouth daily        valACYclovir (VALTREX) 1000 mg tablet Take 1 tablet (1,000 mg) by mouth 3 times daily for 7 days 21 tablet 0       Allergies   Allergen Reactions     Adhesive Tape      Steri strips left scar.     Latex      Possible allergy.         Recent Labs   Lab Test 08/14/19  1420 02/06/19  1541 08/08/18  1540 02/20/18  1107 02/20/18  1107 02/05/16  0802 02/05/16  0802 03/02/15  1548 03/02/15  1548   LDL  --   --   --   --  99  --   --   --  80   HDL  --   --   --   --  50  --   --   --  45*   TRIG  --   --   --   --  113  --   --   --  175*   ALT 66* 30 31  --   --    < >  --    < > 30   CR 0.67 0.56 0.72   < >  --    < > 0.55   < > 0.61   GFRESTIMATED >90 >90 83  --   --    < > >90  Non  GFR Calc     < > >90  Non  GFR Calc     GFRESTBLACK >90 >90 >90  --   --    < > >90   GFR Calc     < > >90   GFR Calc     POTASSIUM 3.8 3.6 4.0    "< >  --    < > 3.8   < > 3.9   TSH  --   --   --   --  0.95  --  1.32  --   --     < > = values in this interval not displayed.        BP Readings from Last 3 Encounters:   01/11/21 (!) 188/108   11/14/19 138/85   10/11/19 (!) 150/90    Wt Readings from Last 3 Encounters:   01/11/21 121.1 kg (267 lb)   11/14/19 120.2 kg (265 lb)   10/11/19 122.2 kg (269 lb 8 oz)                 Review of Systems   Constitutional, HEENT, cardiovascular, pulmonary, GI, , musculoskeletal, neuro, skin, endocrine and psych systems are negative, except as otherwise noted.        Objective    BP (!) 188/108 (BP Location: Left arm, Patient Position: Chair, Cuff Size: Adult Large)   Pulse 103   Temp 98.1  F (36.7  C) (Tympanic)   Ht 1.676 m (5' 6\")   Wt 121.1 kg (267 lb)   SpO2 96%   BMI 43.09 kg/m    Body mass index is 43.09 kg/m .       Physical Exam   GENERAL: healthy, alert and no distress  EYES: Eyes grossly normal to inspection, PERRL and conjunctivae and sclerae normal  HENT: ear canals and TM's normal, nose and mouth without ulcers or lesions  NECK: no adenopathy, no asymmetry, masses, or scars and thyroid normal to palpation  RESP: distant lung sounds, scattered wheezing  CV: tachycardia  MS: no gross musculoskeletal defects noted, no edema  SKIN: no suspicious lesions or rashes  PSYCH: anxious and fatigued      Visit time:     Over 40 minutes  spent with the patient.   Greater than 50% of our visit time was spent face to face and included patient education and counseling regarding current conditions, and disease management.   Medication management, and plan of care.    HTN - Discussed diet, exercise, compliance  Anxiety - see plan of care, discussed self care  Tachycardia - studies ordered, she states she understands the need to go tot ER with increased symptoms  Visit Content:   Any referrals made are noted in AVS, and referrals have been reviewed with the patient.  Relevant diagnostic testing discussed  Reviewed " appropriate outside records  Lab testing discussed  Any medication adjustment has been reviewed, and outlined in plan of care /  AVS  Health Maintenance   Updated as appropriate.  Record review completed   Detailed plan of care is provided, AVS printed        Assessment & Plan     Reactive airway disease that is not asthma  - Continue inhaler as directed    Mild episode of recurrent major depressive disorder (H)  - Continue plan of care    Benign essential hypertension  - lisinopril (ZESTRIL) 40 MG tablet; Take 1 tablet (40 mg) by mouth daily  - metoprolol succinate ER (TOPROL-XL) 25 MG 24 hr tablet; Take 1 tablet (25 mg) by mouth daily  - TSH with free T4 reflex  - Comprehensive metabolic panel  - BP check Friday    Encounter for screening mammogram for malignant neoplasm of breast  - MA Diagnostic Bilateral w/Sen; Future    Vitamin D deficiency  - Vitamin D level  - D3 5000 U daily    Rapid heart rate  - XR CHEST 2 VW (Clinic Performed); Future  - EKG 12-lead complete w/read - (Clinic Performed)  - NM Lexiscan stress test; Future  - INTERNAL MEDICINE REFERRAL  - Comprehensive metabolic panel    Urinary, incontinence, stress female  - tolterodine ER (DETROL LA) 4 MG 24 hr capsule; Take 1 capsule (4 mg) by mouth daily  - UA    SOB (shortness of breath)  - CBC with platelets differential      Oncology Visit Friday [id number]    Oncology Appt Friday as scheduled  BP recheck at that time      To ER with any concerns or further elevation of BP      Return in about 3 months (around 4/11/2021), or 3 months with me.  SHIRAZ - Kael first available - after his surgery.    Jackie Hitchcock, MAXX  [facility]      Jackie Hitchcock C-P  [phone]

## 2021-01-11 NOTE — PATIENT INSTRUCTIONS
Assessment & Plan     Reactive airway disease that is not asthma  - Continue inhaler as directed    Mild episode of recurrent major depressive disorder (H)  - Continue plan of care    Benign essential hypertension  - lisinopril (ZESTRIL) 40 MG tablet; Take 1 tablet (40 mg) by mouth daily  - metoprolol succinate ER (TOPROL-XL) 25 MG 24 hr tablet; Take 1 tablet (25 mg) by mouth daily  - TSH with free T4 reflex  - Comprehensive metabolic panel  - BP check Friday    Encounter for screening mammogram for malignant neoplasm of breast  - MA Diagnostic Bilateral w/Sen; Future    Vitamin D deficiency  - Vitamin D level  - D3 5000 U daily    Rapid heart rate  - XR CHEST 2 VW (Clinic Performed); Future  - EKG 12-lead complete w/read - (Clinic Performed)  - NM Lexiscan stress test; Future  - INTERNAL MEDICINE REFERRAL  - Comprehensive metabolic panel    Urinary, incontinence, stress female  - tolterodine ER (DETROL LA) 4 MG 24 hr capsule; Take 1 capsule (4 mg) by mouth daily  - UA    SOB (shortness of breath)  - CBC with platelets differential      Oncology Visit Friday [id number]    Oncology Appt Friday as scheduled  BP recheck at that time      To ER with any concerns or further elevation of BP      Return in about 3 months (around 4/11/2021), or 3 months with me.  SHIRAZ - Kael first available - after his surgery.    Jackie Hitchcock CNP  [facility]cock [facility]  [phone]

## 2021-01-12 LAB
ALBUMIN SERPL-MCNC: 4.2 G/DL (ref 3.4–5)
ALP SERPL-CCNC: 63 U/L (ref 40–150)
ALT SERPL W P-5'-P-CCNC: 35 U/L (ref 0–50)
ANION GAP SERPL CALCULATED.3IONS-SCNC: 8 MMOL/L (ref 3–14)
AST SERPL W P-5'-P-CCNC: 17 U/L (ref 0–45)
BILIRUB SERPL-MCNC: 0.3 MG/DL (ref 0.2–1.3)
BUN SERPL-MCNC: 24 MG/DL (ref 7–30)
CALCIUM SERPL-MCNC: 9.3 MG/DL (ref 8.5–10.1)
CHLORIDE SERPL-SCNC: 105 MMOL/L (ref 94–109)
CO2 SERPL-SCNC: 26 MMOL/L (ref 20–32)
CREAT SERPL-MCNC: 0.61 MG/DL (ref 0.52–1.04)
GFR SERPL CREATININE-BSD FRML MDRD: >90 ML/MIN/{1.73_M2}
GLUCOSE SERPL-MCNC: 98 MG/DL (ref 70–99)
POTASSIUM SERPL-SCNC: 3.7 MMOL/L (ref 3.4–5.3)
PROT SERPL-MCNC: 8.3 G/DL (ref 6.8–8.8)
SODIUM SERPL-SCNC: 139 MMOL/L (ref 133–144)
TSH SERPL DL<=0.005 MIU/L-ACNC: 2.9 MU/L (ref 0.4–4)

## 2021-01-12 ASSESSMENT — ASTHMA QUESTIONNAIRES: ACT_TOTALSCORE: 15

## 2021-01-12 ASSESSMENT — ANXIETY QUESTIONNAIRES: GAD7 TOTAL SCORE: 13

## 2021-01-13 LAB — DEPRECATED CALCIDIOL+CALCIFEROL SERPL-MC: 58 UG/L (ref 20–75)

## 2021-01-15 ENCOUNTER — ONCOLOGY VISIT (OUTPATIENT)
Dept: ONCOLOGY | Facility: OTHER | Age: 61
End: 2021-01-15
Attending: NURSE PRACTITIONER
Payer: COMMERCIAL

## 2021-01-15 VITALS
HEART RATE: 97 BPM | RESPIRATION RATE: 24 BRPM | DIASTOLIC BLOOD PRESSURE: 78 MMHG | SYSTOLIC BLOOD PRESSURE: 160 MMHG | HEIGHT: 66 IN | WEIGHT: 273.59 LBS | TEMPERATURE: 98.2 F | BODY MASS INDEX: 43.97 KG/M2 | OXYGEN SATURATION: 96 %

## 2021-01-15 DIAGNOSIS — F41.9 ANXIETY AND DEPRESSION: ICD-10-CM

## 2021-01-15 DIAGNOSIS — D05.01 LOBULAR CARCINOMA IN SITU OF RIGHT BREAST: ICD-10-CM

## 2021-01-15 DIAGNOSIS — D05.11 DUCTAL CARCINOMA IN SITU OF RIGHT BREAST: Primary | ICD-10-CM

## 2021-01-15 DIAGNOSIS — F32.A ANXIETY AND DEPRESSION: ICD-10-CM

## 2021-01-15 PROCEDURE — 99213 OFFICE O/P EST LOW 20 MIN: CPT | Performed by: NURSE PRACTITIONER

## 2021-01-15 ASSESSMENT — MIFFLIN-ST. JEOR: SCORE: 1822.75

## 2021-01-15 ASSESSMENT — PAIN SCALES - GENERAL: PAINLEVEL: SEVERE PAIN (6)

## 2021-01-15 NOTE — PROGRESS NOTES
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 She was placed on tamoxifen 20 mg daily in March of 2014 and discontinued this 4/1/2019.      She presents to the clinic today somewhat anxious. She states she discontinued her antidepressant on Monday as she was under the impression that one of her new medications given to her by her PCP was a new antidepressant. She doesn't feel the Lexapro is working well for her. She expresses anxiety and is stressed in regards to medical bills and her current health status stating she needs to get knee surgery but is unable to do this as she no longer has sick hours.     She will be undergoing a stress test at the end of this month per her PCPs recommendation. She has been dealing with rapid heart rate.    She denies any change in the breast tissue and states she will be having a mammogram at the end of this month.    Oncologic History:     Vandana had undergone an abnormal mammogram in November 2013 which showed microcalcifications in the right breast suspicious for malignancy. She subsequently underwent stereotactic biopsy on November 21, 2013 with original interpretation of histopathology being that of hyperplasia without atypia. This was then over read as part of routine protocol in January 2014. 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 She was evaluated by Dr. Shanika Beaver in Gen. Surgery on January 28, 2014 and bilateral MRI of the breast was obtained which revealed oval-shaped nodular densities in both breasts consistent with inframammary lymph nodes.       The patient was seen by  Dr. Chávez of radiation oncology on 1/31/2014 and his impression was that the patient had right DCIS and would require definitive surgery.      The patient underwent right excisional breast biopsy on 2/13/2014 and pathology showed LCIS, multifocal, completely excised and low-grade, grade 1, DCIS which was completely excised. Tumor size was at least 0.5 cm DCIS grade 1; margins were uninvolved by DCIS. Estrogen receptor was positive at 90% and progesterone receptor was positive at 50%. Patient was staged pathologic Tis associated with LCIS. She was started on tamoxifen.      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 Provider to review     Recurrent major depressive disorder (H) 5/21/2019     Unspecified immunity deficiency      Urinary, incontinence, stress female 1/11/2021     Vitamin D deficiency 1/26/2016       Past Surgical History:   Procedure Laterality Date     BIOPSY      right breast--neg     BIOPSY BREAST NEEDLE LOCALIZATION  2/13/2014    Procedure: BIOPSY BREAST NEEDLE LOCALIZATION;  WIRE LOCALIZED SEGMENTAL RESECTION, RIGHT BREAST;  Surgeon: Shanika Beaver MD;  Location: [facility]     CHOLECYSTECTOMY  2010      COLONOSCOPY       COLONOSCOPY  2013     DILATION AND CURETTAGE, OPERATIVE HYSTEROSCOPY, COMBINED  2017    [facility]     ORTHOPEDIC SURGERY      heel surgery     ORTHOPEDIC SURGERY      rt knee meniiscus tear       Family History   Problem Relation Age of Onset     C.A.D. Father      Coronary Artery Disease Father      Cerebrovascular Disease Father      Prostate Cancer Father      Hypertension Mother      Cerebrovascular Disease Mother      Breast Cancer Mother 55         age 71 left mastectomy     Thyroid Disease Other      Thyroid Disease Sister      Diabetes No family hx of      Asthma No family hx of      Hyperlipidemia No family hx of      Colon Cancer No family hx of      Other Cancer No family hx of      Anesthesia Reaction No family hx of      Genetic Disorder No family hx of        Social History     Socioeconomic History     Marital status: Single     Spouse name: Not on file     Number of children: Not on file     Years of education: Not on file     Highest education level: Not on file   Occupational History     Not on file   Social Needs     Financial resource strain: Not on file     Food insecurity     Worry: Not on file     Inability: Not on file     Transportation needs     Medical: Not on file     Non-medical: Not on file   Tobacco Use     Smoking status: Former Smoker     Packs/day: 0.10     Years: 30.00     Pack years: 3.00     Types: Cigarettes     Start date: 8/15/1974     Quit date: 3/3/2013     Years since quittin.8     Smokeless tobacco: Never Used   Substance and Sexual Activity     Alcohol use: Yes     Drug use: No     Sexual activity: Yes     Partners: Male   Lifestyle     Physical activity     Days per week: Not on file     Minutes per session: Not on file     Stress: Not on file   Relationships     Social connections     Talks on phone: Not on file     Gets together: Not on file     Attends [employer] service: Not on file     Active member of club or organization: Not  on file     Attends meetings of clubs or organizations: Not on file     Relationship status: Not on file     Intimate partner violence     Fear of current or ex partner: Not on file     Emotionally abused: Not on file     Physically abused: Not on file     Forced sexual activity: Not on file   Other Topics Concern     Parent/sibling w/ CABG, MI or angioplasty before 65F 55M? No   Social History Narrative     Not on file       Current Outpatient Medications   Medication     acetaminophen (TYLENOL 8 HOUR ARTHRITIS PAIN) 650 MG CR tablet     albuterol (PROAIR HFA) 108 (90 Base) MCG/ACT inhaler     ASPIRIN PO     calcium-vitamin D (CALTRATE) 600-400 MG-UNIT per tablet     cyanocobalamin ([employer] VITAMIN  B12) 1000 MCG TABS     doxylamine (UNISOM) 25 MG TABS tablet     lisinopril (ZESTRIL) 40 MG tablet     metoprolol succinate ER (TOPROL-XL) 25 MG 24 hr tablet     Multiple Vitamin (DAILY MULTIVITAMIN PO)     Omega-3 Fatty Acids (OMEGA 3 PO)     phentermine (ADIPEX-P) 37.5 MG tablet     tolterodine ER (DETROL LA) 4 MG 24 hr capsule     VITAMIN D, CHOLECALCIFEROL, PO     albuterol (2.5 MG/3ML) 0.083% neb solution     escitalopram (LEXAPRO) 20 MG tablet     nystatin (MYCOSTATIN) 058864 UNIT/GM POWD     UNABLE TO FIND     No current facility-administered medications for this visit.         Allergies   Allergen Reactions     Adhesive Tape      Steri strips left scar.     Latex      Possible allergy.       Review Of Systems:  Constitutional:    denies fever, weight changes, chills, and night sweats.  Eyes:    denies blurred or double vision  Ears/Nose/Throat:   denies ear pain, nose problems, difficulty swallowing  Respiratory:   denies shortness of breath, cough   Skin:   denies rash, lesions  Breast/Chest wall:   denies pain, lumps or discharge  Cardiovascular:  See hPI  Gastrointestinal:   denies abdominal pain, bloating, nausea, vomiting, early satiety; no change in bowel habits or blood in stool  Genitourinary:   denies  "difficulty with urination, blood in urine-up at night to urinate with urgency  Musculoskeletal:    denies new muscle pain, bone pain  Neurologic:   denies headaches, numbness or tingling  Psychiatric:  See HPI  Hematologic/Lymphatic/Immunologic:   denies easy bruising, easy bleeding, lumps or bumps noted  Endocrine:   Has some increased thirst      Physical Exam:  BP (!) 160/78   Pulse 97   Temp 98.2  F (36.8  C) (Tympanic)   Resp 24   Ht 1.676 m (5' 6\")   Wt 124.1 kg (273 lb 9.5 oz)   SpO2 96%   BMI 44.16 kg/m      GENERAL APPEARANCE: Healthy, alert and in no acute distress.  HEENT: Normocephalic, Sclerae anicteric.   NECK:   No asymmetry or masses, no thyromegaly.  LYMPHATICS: No palpable cervical, supraclavicular, axillary nodes   RESP: Lungs clear to auscultation bilaterally, respirations regular and easy  CARDIOVASCULAR: Regular rate and rhythm. Normal S1, S2  ABDOMEN: Soft, nontender. Bowel sounds auscultated all 4 quadrants.   BREAST/Chest wall: large pendulous breasts, no concerning lumps, masses or tenderness bilaterally  MUSCULOSKELETAL: Extremities without gross deformities noted.   NEURO: Alert and oriented x 3.  Gait steady.  PSYCHIATRIC: Mentation and affect appear normal.  Mood appropriate.    Laboratory:    Component      Latest Ref Rng & Units 1/11/2021   WBC      4.0 - 11.0 10e9/L 7.4   RBC Count      3.8 - 5.2 10e12/L 4.30   Hemoglobin      11.7 - 15.7 g/dL 12.9   Hematocrit      35.0 - 47.0 % 39.4   MCV      78 - 100 fl 92   MCH      26.5 - 33.0 pg 30.0   MCHC      31.5 - 36.5 g/dL 32.7   RDW      10.0 - 15.0 % 13.4   Platelet Count      150 - 450 10e9/L 357   % Neutrophils      % 56.1   % Lymphocytes      % 33.2   % Monocytes      % 8.8   % Eosinophils      % 1.6   % Basophils      % 0.3   Absolute Neutrophil      1.6 - 8.3 10e9/L 4.2   Absolute Lymphocytes      0.8 - 5.3 10e9/L 2.5   Absolute Monocytes      0.0 - 1.3 10e9/L 0.7   Absolute Eosinophils      0.0 - 0.7 10e9/L 0.1   Absolute " Basophils      0.0 - 0.2 10e9/L 0.0   Diff Method       Automated Method   Sodium      133 - 144 mmol/L 139   Potassium      3.4 - 5.3 mmol/L 3.7   Chloride      94 - 109 mmol/L 105   Carbon Dioxide      20 - 32 mmol/L 26   Anion Gap      3 - 14 mmol/L 8   Glucose      70 - 99 mg/dL 98   Urea Nitrogen      7 - 30 mg/dL 24   Creatinine      0.52 - 1.04 mg/dL 0.61   GFR Estimate      >60 mL/min/1.73:m2 >90   GFR Estimate If Black      >60 mL/min/1.73:m2 >90   Calcium      8.5 - 10.1 mg/dL 9.3   Bilirubin Total      0.2 - 1.3 mg/dL 0.3   Albumin      3.4 - 5.0 g/dL 4.2   Protein Total      6.8 - 8.8 g/dL 8.3   Alkaline Phosphatase      40 - 150 U/L 63   ALT      0 - 50 U/L 35   AST      0 - 45 U/L 17     Imaging Studies:   None completed for today's visit      ASSESSMENT/PLAN:    1. DCIS: Ductal carcinoma in situ of the right breast and lobular carcinoma in situ of the right breast diagnosed January 2014. Patient underwent lumpectomy. She was then placed on tamoxifen 20mg daily and completed 5 years of therapy.  Patient will have her screening mammogram 2/2/2021. We will call her with plan for follow-up.    2. LCIS: All discussed with the clinical nurse specialist at the [facility] with the cancer risk management program to see if high risk screening would be warranted. She is aware I'll call her with the plan.    3. Anxiety/depression: I recommended she follow-up with her PCP as soon as she is able. I recommended she resume her Lexapro until she discusses further with her PCP.    I encouraged patient to call with any questions or concerns.     Matilde Patrick, NP  APRN, FNP-BC, AOCNP

## 2021-01-15 NOTE — NURSING NOTE
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    Weight as of this encounter: 124.1 kg (273 lb 9.5 oz).  Medication Reconciliation: complete.  Immunizations and advance directives status reviewed. Pain scale =0 , PHQ-2=0.            Darling Bain LPN    "

## 2021-01-15 NOTE — PATIENT INSTRUCTIONS
We will call you with a plan for follow up.    If you have any questions please call [phone]    Other instructions:  none

## 2021-01-21 ENCOUNTER — TELEPHONE (OUTPATIENT)
Dept: ONCOLOGY | Facility: OTHER | Age: 61
End: 2021-01-21

## 2021-01-21 DIAGNOSIS — D05.11 DUCTAL CARCINOMA IN SITU OF RIGHT BREAST: ICD-10-CM

## 2021-01-21 DIAGNOSIS — D05.01 LOBULAR CARCINOMA IN SITU OF RIGHT BREAST: Primary | ICD-10-CM

## 2021-01-21 NOTE — TELEPHONE ENCOUNTER
Telephone call to patient to discuss high risk breast cancer follow-up.  I left a message for her to return my call.

## 2021-01-22 ENCOUNTER — HOSPITAL ENCOUNTER (OUTPATIENT)
Dept: CARDIOLOGY | Facility: HOSPITAL | Age: 61
Setting detail: NUCLEAR MEDICINE
End: 2021-01-22
Attending: NURSE PRACTITIONER
Payer: COMMERCIAL

## 2021-01-22 ENCOUNTER — HOSPITAL ENCOUNTER (OUTPATIENT)
Dept: NUCLEAR MEDICINE | Facility: HOSPITAL | Age: 61
Setting detail: NUCLEAR MEDICINE
End: 2021-01-22
Attending: NURSE PRACTITIONER
Payer: COMMERCIAL

## 2021-01-22 DIAGNOSIS — R00.0 RAPID HEART RATE: ICD-10-CM

## 2021-01-22 LAB
CV BLOOD PRESSURE: 75 %
CV STRESS MAX HR HE: 102
NUC STRESS EJECTION FRACTION: 73 %
RATE PRESSURE PRODUCT: NORMAL
STRESS ECHO BASELINE DIASTOLIC HE: 98
STRESS ECHO BASELINE HR: 84
STRESS ECHO BASELINE SYSTOLIC BP: 172
STRESS ECHO CALCULATED PERCENT HR: 64 %
STRESS ECHO LAST STRESS DIASTOLIC BP: 80
STRESS ECHO LAST STRESS SYSTOLIC BP: 170
STRESS ECHO TARGET HR: 159

## 2021-01-22 PROCEDURE — 93016 CV STRESS TEST SUPVJ ONLY: CPT | Performed by: INTERNAL MEDICINE

## 2021-01-22 PROCEDURE — A9500 TC99M SESTAMIBI: HCPCS | Performed by: RADIOLOGY

## 2021-01-22 PROCEDURE — 343N000001 HC RX 343: Performed by: RADIOLOGY

## 2021-01-22 PROCEDURE — 78452 HT MUSCLE IMAGE SPECT MULT: CPT

## 2021-01-22 PROCEDURE — 93018 CV STRESS TEST I&R ONLY: CPT | Performed by: INTERNAL MEDICINE

## 2021-01-22 PROCEDURE — 250N000011 HC RX IP 250 OP 636: Performed by: INTERNAL MEDICINE

## 2021-01-22 PROCEDURE — 93017 CV STRESS TEST TRACING ONLY: CPT | Performed by: INTERNAL MEDICINE

## 2021-01-22 RX ORDER — AMINOPHYLLINE 25 MG/ML
INJECTION, SOLUTION INTRAVENOUS
Status: DISCONTINUED
Start: 2021-01-22 | End: 2021-01-22 | Stop reason: WASHOUT

## 2021-01-22 RX ORDER — REGADENOSON 0.08 MG/ML
0.4 INJECTION, SOLUTION INTRAVENOUS ONCE
Status: COMPLETED | OUTPATIENT
Start: 2021-01-22 | End: 2021-01-22

## 2021-01-22 RX ADMIN — Medication 10.1 MILLICURIE: at 07:15

## 2021-01-22 RX ADMIN — REGADENOSON 0.4 MG: 0.08 INJECTION, SOLUTION INTRAVENOUS at 09:10

## 2021-01-22 RX ADMIN — Medication 30.5 MILLICURIE: at 09:10

## 2021-01-22 NOTE — RESULT ENCOUNTER NOTE
The nuclear stress test is abnormal.     There is a small area of ischemia in the inferior segment(s) of the left ventricle adjacent to the apex.     There is a small area of infarction in the apical segment(s) of the left ventricle.     The left ventricular ejection fraction at rest is 75%.  The left ventricular ejection fraction at stress is 73%.     There is no prior study for comparison.    Lets refer her to Cardiology, Shaun - Dx - abnormal stress test    [facility]  [phone]

## 2021-01-25 ENCOUNTER — NURSE TRIAGE (OUTPATIENT)
Dept: FAMILY MEDICINE | Facility: OTHER | Age: 61
End: 2021-01-25

## 2021-01-25 NOTE — TELEPHONE ENCOUNTER
Patient notified of note below, declines referral to ophthalmologists at this time. Is already set up with cardiology for consults.

## 2021-01-25 NOTE — TELEPHONE ENCOUNTER
"Pt calling and has bloody inner right eye.Not bleeding,but sclera is bloody she states.She noticed it this AM. 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 She thinks she is starting to gt a headache above that eye.Denies pain,blurred vision to the eye.She states her BP meds have been adjusted and she just had a stress test on Friday and thinks that this might have something to do with it.Denies dizziness, chest pain or SOB.Advised her to go to  and she states she cannot afford another bill and is going to work and see how it goes.Advised UC. She anxiously said please let PCP and her nurse know.    Please advise.      Milly Horne RN      Additional Information    Negative: Chemical got in the eye    Negative: Piece of something got in the eye    Negative: Eye redness followed an eye injury    Negative: Yellow or green pus in the eyes    Negative: [1] Eyelid is swollen AND [2] no redness of white of eye (sclera)    Negative: Caused by pollen or other allergy OR the main symptom is itchy eyes    Negative: Red, widespread rash also present    Negative: Vomiting    Negative: [1] Recent eye surgery AND [2] increasing eye pain    Negative: Patient sounds very sick or weak to the triager    Negative: Blurred vision    Negative: [1] Eye pain AND [2] more than mild    Negative: Cloudy spot or sore seen on the cornea (clear part of the eye)    Negative: Eyelid is very swollen (shut or almost)    Negative: Eyelid is red and painful (or tender to touch)    Negative: Eye pain present > 24 hours    Bleeding on white of the eye    Negative: [1] Bleeding on white of the eye AND [2] taking Coumadin (warfarin) or other strong blood thinner, or known bleeding disorder (e.g., thrombocytopenia)    Answer Assessment - Initial Assessment Questions  1. LOCATION: Location: \"What's red, the eyeball or the outer " "eyelids?\" (Note: when callers say the eye is red, they usually mean the sclera is red)        Right eye bloody inner eye half  2. REDNESS OF SCLERA: \"Is the redness in one or both eyes?\" \"When did the redness start?\"       This AM  3. ONSET: \"When did the eye become red?\" (e.g., hours, days)       4. EYELIDS: \"Are the eyelids red or swollen?\" If so, ask: \"How much?\"     no  5. VISION: \"Is there any difficulty seeing clearly?\"      no  6. ITCHING: \"Does it feel itchy?\" If so ask: \"How bad is it\" (e.g., Scale 1-10; or mild, moderate, severe)     no  7. PAIN: \"Is there any pain? If so, ask: \"How bad is it?\" (e.g., Scale 1-10; or mild, moderate, severe)     no  8. CONTACT LENS: \"Do you wear contacts?\"     no  9. CAUSE: \"What do you think is causing the redness?\"      BP or stress caused this  10. OTHER SYMPTOMS: \"Do you have any other symptoms?\" (e.g., fever, runny nose, cough, vomiting)       No-no dizziness, no trouble breathing or chest pain    Protocols used: EYE - RED WITHOUT PUS-A-AH      "

## 2021-01-25 NOTE — TELEPHONE ENCOUNTER
I wouldn't expect an ocular hemorrhage due to HTN.  Most likely due to sneezing, straining, or other.  Her stress test was abnormal, and I ordered a cardiology consult.  Perhaps, if she is concerned about the eye, we could get her in to Dr Greene or other ophthalmologist.    Jackie [facility]cock [facility]  [phone]

## 2021-01-26 NOTE — TELEPHONE ENCOUNTER
Telephone again to patient to call me back in regards to high risk screening for breast cancer.  I informed her that I would like to chat with her prior to her having her mammogram in 2/2/2021.

## 2021-01-28 ENCOUNTER — TELEPHONE (OUTPATIENT)
Dept: ONCOLOGY | Facility: OTHER | Age: 61
End: 2021-01-28

## 2021-01-28 NOTE — PROGRESS NOTES
"  Assessment & Plan   Problem List Items Addressed This Visit     None      Visit Diagnoses     Abnormal stress test    -  Primary    Relevant Orders    CARDIOLOGY EVAL ADULT REFERRAL    Benign essential hypertension        Relevant Medications    metoprolol succinate ER (TOPROL-XL) 50 MG 24 hr tablet        Conclusion          The nuclear stress test is abnormal.     There is a small area of ischemia in the inferior segment(s) of the left ventricle adjacent to the apex.     There is a small area of infarction in the apical segment(s) of the left ventricle.     The left ventricular ejection fraction at rest is 75%.  The left ventricular ejection fraction at stress is 73%.     There is no prior study for comparison.      ECG Summary    ECG Baseline electrocardiogram demonstrates sinus rhythm.   The patient did not develop any acute EKG changes or arrhythmias during the Lexiscan portion of the study.       20 minutes spent on the date of the encounter doing chart review, review of test results, interpretation of tests, patient visit and documentation          BMI:   Estimated body mass index is 44.39 kg/m  as calculated from the following:    Height as of 1/15/21: 1.676 m (5' 6\").    Weight as of this encounter: 124.7 kg (275 lb).       Ricardo Scruggs, [facility]     Vandana is a 60 year old who presents to clinic today for the following health issues     HPI       Concern - Stress Test  Onset: Follow up   Description: here to discuss recent stress test. Denies chest pain, heart palpitations or shortness of breath   Intensity: n/a  Progression of Symptoms:  same  Accompanying Signs & Symptoms: elevated BP  Previous history of similar problem: yes  Precipitating factors:        Worsened by: none  Alleviating factors:        Improved by: none   Therapies tried and outcome: lisinopril and metoprolol     Vandana presents today for follow up from recent stress test and for " evaluation of HTN.   She is currently on Lisinopril 40 mg and Toprol Xl 25 mg.   He stress test did reveal a small area of ischemia in the inferior/apical segments with an EF > 70%.  She denies any chest pain or SOB.          Review of Systems   Constitutional, HEENT, cardiovascular, pulmonary, gi and gu systems are negative, except as otherwise noted.      Objective    There were no vitals taken for this visit.  There is no height or weight on file to calculate BMI.  Physical Exam   GENERAL: Alert and no distress  RESP: lungs clear to auscultation - no rales, rhonchi or wheezes  CV: regular rate and rhythm, normal S1 S2, no S3 or S4, no murmur, click or rub, no peripheral edema and peripheral pulses strong  MS: no gross musculoskeletal defects noted, no edema  SKIN: no suspicious lesions or rashes  PSYCH: [facility]    Hospital Outpatient Visit on 01/22/2021   Component Date Value Ref Range Status     Target HR 01/22/2021 159   Final     Baseline Systolic BP 01/22/2021 172   Final     Baseline Diastolic BP 01/22/2021 98   Final     Last Stress Systolic BP 01/22/2021 170   Final     Last Stress Diastolic BP 01/22/2021 80   Final     Baseline HR 01/22/2021 84   Final     Max HR 01/22/2021 102   Final     Calculated Percent HR 01/22/2021 64  % Final     Rate Pressure Product 01/22/2021 17,340.0   Final     Nuc Rest EF 01/22/2021 75  % Final     Left Ventricular EF 01/22/2021 73  % Final     No results found for any visits on 02/02/21.  No results found for this or any previous visit (from the past 24 hour(s)).

## 2021-01-28 NOTE — LETTER
1/28/2021      Dear Vandana,    I'm writing in regards to follow up on our conversation when we last visited.  We had briefly discussed high risk breast cancer screening.  You would qualify for this screening which would entail a mammogram alternating with a breast MRI every 6 months.  I would see you every 6 months for a clinical breast exam and to order the testing.    Please call our office at [phone] if you are interested in having the high risk breast cancer follow ups.  If you have any questions or would like more information, I would gladly talk with you more about it.  Please let the Health Unit Coordinator know you would like to talk with me when you call.    Thanks Vandana.    Sincerely,         Birdie Patrick, LENNY, FNP-BC, AOCNP  Medical Oncology  [phone]

## 2021-01-29 ENCOUNTER — TELEPHONE (OUTPATIENT)
Dept: ONCOLOGY | Facility: OTHER | Age: 61
End: 2021-01-29

## 2021-01-29 DIAGNOSIS — D05.01 LOBULAR CARCINOMA IN SITU OF RIGHT BREAST: Primary | ICD-10-CM

## 2021-01-29 DIAGNOSIS — Z86.000 HISTORY OF DUCTAL CARCINOMA IN SITU (DCIS) OF BREAST: ICD-10-CM

## 2021-01-29 DIAGNOSIS — Z91.89 AT HIGH RISK FOR BREAST CANCER: ICD-10-CM

## 2021-01-29 NOTE — TELEPHONE ENCOUNTER
Telephone call returned from the patient and we discussed high risk breast screening and patient states she would like to follow through with this.  We will cancel her mammogram that is to be done on Tuesday, February 2, 2021 and she is aware of this.  We will plan to obtain a bilateral breast MRI at the end of February as she currently has other medical issues going on that she needs to address.  We also did discuss the gadolinium that is used as the MRI contrast and risks and an information sheet will be given when she presents for the MRI.  Patient had no further questions and she is aware that I will see her every 6 months for clinical breast exam and plan for imaging.  She is aware that someone will be calling to schedule her MRI for the end of February.

## 2021-02-02 ENCOUNTER — OFFICE VISIT (OUTPATIENT)
Dept: INTERNAL MEDICINE | Facility: OTHER | Age: 61
End: 2021-02-02
Attending: INTERNAL MEDICINE
Payer: COMMERCIAL

## 2021-02-02 ENCOUNTER — NURSE TRIAGE (OUTPATIENT)
Dept: NURSING | Facility: CLINIC | Age: 61
End: 2021-02-02

## 2021-02-02 VITALS
BODY MASS INDEX: 44.39 KG/M2 | DIASTOLIC BLOOD PRESSURE: 92 MMHG | TEMPERATURE: 97.2 F | HEART RATE: 90 BPM | OXYGEN SATURATION: 99 % | SYSTOLIC BLOOD PRESSURE: 162 MMHG | WEIGHT: 275 LBS

## 2021-02-02 DIAGNOSIS — R94.39 ABNORMAL STRESS TEST: Primary | ICD-10-CM

## 2021-02-02 DIAGNOSIS — I10 BENIGN ESSENTIAL HYPERTENSION: ICD-10-CM

## 2021-02-02 PROCEDURE — 99214 OFFICE O/P EST MOD 30 MIN: CPT | Performed by: INTERNAL MEDICINE

## 2021-02-02 RX ORDER — METOPROLOL SUCCINATE 50 MG/1
50 TABLET, EXTENDED RELEASE ORAL DAILY
Qty: 30 TABLET | Refills: 1 | Status: SHIPPED | OUTPATIENT
Start: 2021-02-02 | End: 2021-04-06

## 2021-02-02 RX ORDER — METOPROLOL SUCCINATE 25 MG/1
50 TABLET, EXTENDED RELEASE ORAL DAILY
Qty: 30 TABLET | Refills: 1 | Status: SHIPPED | OUTPATIENT
Start: 2021-02-02 | End: 2021-02-02

## 2021-02-02 ASSESSMENT — PAIN SCALES - GENERAL: PAINLEVEL: MILD PAIN (3)

## 2021-02-02 NOTE — TELEPHONE ENCOUNTER
Pt called in states she see the PCP today and the Dr told her to take metoprolol succinate ER (TOPROL-XL) 50 MG 24 hr tablet.  Pt states the provider told her to take  50 mg of the medication.  Pt took the medication  at 11: am today after she came back from the clinic.  The Pt has vomited 1 time just now.  The Pt also has dizziness.  Pt is dizzy and not able to stand and walk.  Pt was not willing to answer triage question.  Pt has to speak the nurse at the clinic.  Inform the Pt the clinic is closed at this time.  No headache, no chest pain, no blurred vision, no difficulty breathing.  Has  Weakness.  The Pt is not able to walk at this time.  The disposition is to be seen ED or UC now.  Care advice given per protocol.  Patient agrees with care advice given.   Agreed to call back if he has additional symptoms or questions.    Hong Barragan [place] Nurse Advisor 2/2/2021 5:43 PM      Additional Information    Negative: Difficult to awaken or acting confused (e.g., disoriented, slurred speech)    Negative: Severe difficulty breathing (e.g., struggling for each breath, speaks in single words)    Negative: [1] Weakness of the face, arm or leg on one side of the body AND [2] new onset    Negative: [1] Numbness (i.e., loss of sensation) of the face, arm or leg on one side of the body AND [2] new onset    Negative: [1] Chest pain lasts > 5 minutes AND [2] history of heart disease  (i.e., heart attack, bypass surgery, angina, angioplasty, CHF)    Negative: [1] Chest pain AND [2] took nitrogylcerin AND [3] pain was not relieved    Negative: Sounds like a life-threatening emergency to the triager    Negative: Symptom is main concern  (e.g., headache, chest pain)    Negative: Low blood pressure is main concern    Negative: [1] Systolic BP  >= 160 OR Diastolic >= 100 AND [2] cardiac or neurologic symptoms (e.g., chest pain, difficulty breathing, unsteady gait, blurred vision)    Negative: [1] Pregnant > 20 weeks (or postpartum  < 6 weeks) AND [2] new hand or face swelling    Negative: [1] Pregnant > 20 weeks AND [2] BP Systolic BP  >= 140 OR Diastolic >= 90    Negative: Shock suspected (e.g., cold/pale/clammy skin, too weak to stand, low BP, rapid pulse)    Negative: Difficult to awaken or acting confused (e.g., disoriented, slurred speech)    Negative: Fainted, and still feels dizzy afterwards    Negative: Severe difficulty breathing (e.g., struggling for each breath, speaks in single words)    Negative: Overdose (accidental or intentional) of medications    Negative: New neurologic deficit that is present now: * Weakness of the face, arm, or leg on one side of the body * Numbness of the face, arm, or leg on one side of the body * Loss of speech or garbled speech    Negative: Heart beating < 50 beats per minute OR > 140 beats per minute    Negative: Sounds like a life-threatening emergency to the triager    Negative: Chest pain    Negative: Rectal bleeding, bloody stool, or tarry-black stool    Negative: Vomiting is the main symptom    Negative: Diarrhea is the main symptom    Negative: Headache is the main symptom    Negative: Heat exhaustion suspected (i.e., dehydration from heat exposure)    Negative: Patient states that he/she is having an anxiety/panic attack    SEVERE dizziness (e.g., unable to stand, requires support to walk, feels like passing out now)    Protocols used: HIGH BLOOD PRESSURE-A-AH, DIZZINESS-A-OH

## 2021-02-02 NOTE — NURSING NOTE
"Chief Complaint   Patient presents with     Heart Problem       Initial BP (!) 162/92 (BP Location: Left arm, Patient Position: Chair, Cuff Size: Adult Large)   Pulse 90   Temp 97.2  F (36.2  C) (Tympanic)   Wt 124.7 kg (275 lb)   SpO2 99%   BMI 44.39 kg/m   Estimated body mass index is 44.39 kg/m  as calculated from the following:    Height as of 1/15/21: 1.676 m (5' 6\").    Weight as of this encounter: 124.7 kg (275 lb).  Medication Reconciliation: complete  JADE SO LPN  "

## 2021-02-03 ENCOUNTER — TRANSFERRED RECORDS (OUTPATIENT)
Dept: HEALTH INFORMATION MANAGEMENT | Facility: CLINIC | Age: 61
End: 2021-02-03

## 2021-02-03 ENCOUNTER — TELEPHONE (OUTPATIENT)
Dept: FAMILY MEDICINE | Facility: OTHER | Age: 61
End: 2021-02-03

## 2021-02-03 LAB
CREAT SERPL-MCNC: 0.67 MG/DL (ref 0.4–1)
GFR SERPL CREATININE-BSD FRML MDRD: >60 ML/MIN/1.73M2
GLUCOSE SERPL-MCNC: 112 MG/DL (ref 70–99)
POTASSIUM SERPL-SCNC: 4 MEQ/L (ref 3.4–5.1)

## 2021-02-03 NOTE — TELEPHONE ENCOUNTER
Pt called, reports dizziness since starting increased metoprolol. Was triaged by [facility] nurse advisor last night (see separate encounter) and was advised to be seen in UC/ER. Pt states that she does not need to be seen again. Reports that dizziness severe enough that her boyfriend helps her to the bathroom. Pt would like message sent to Dr Scruggs. Please advise. Thank you!

## 2021-02-03 NOTE — TELEPHONE ENCOUNTER
Patient scheduled for follow-up per below, Diagnostic Imaging will call patient to set up breast MRI.

## 2021-03-01 ENCOUNTER — DOCUMENTATION ONLY (OUTPATIENT)
Dept: INTERVENTIONAL RADIOLOGY/VASCULAR | Facility: HOSPITAL | Age: 61
End: 2021-03-01

## 2021-03-01 NOTE — PROGRESS NOTES
Patient will need CRNA/ Anesthesia to start IV . We will call R[facility] frausto of and someone will come down.

## 2021-03-09 ENCOUNTER — TRANSFERRED RECORDS (OUTPATIENT)
Dept: HEALTH INFORMATION MANAGEMENT | Facility: CLINIC | Age: 61
End: 2021-03-09

## 2021-03-16 DIAGNOSIS — F33.8 SEASONAL AFFECTIVE DISORDER (H): ICD-10-CM

## 2021-03-16 RX ORDER — ESCITALOPRAM OXALATE 20 MG/1
TABLET ORAL
Qty: 90 TABLET | Refills: 0 | Status: SHIPPED | OUTPATIENT
Start: 2021-03-16 | End: 2021-04-06

## 2021-03-25 PROBLEM — R07.2 PRECORDIAL PAIN: Status: ACTIVE | Noted: 2021-02-26

## 2021-03-25 NOTE — PROGRESS NOTES
Assessment & Plan       Essential hypertension  - Lipid Profile (Chol, Trig, HDL, LDL calc)  - Comprehensive metabolic panel (BMP + Alb, Alk Phos, ALT, AST, Total. Bili, TP)  - TSH with free T4 reflex  - CBC with platelets and differential  - UA reflex to Microscopic and Culture - [facility]    Mild episode of recurrent major depressive disorder (H)  - Continue plan of care    Vitamin D deficiency  - Vitamin D level    ASCVD (arteriosclerotic cardiovascular disease)  - Continue plan of care    Cervicalgia  - Ice  - Topical preparation  - Massage  - Consider PT  - Follow-up if unimproved    Incontinence of urine  - discontinue Detrol - not effective  - Try Vesicare  - Follow-up as needed    Depression   - Increase Lexapro to 30 mg daily (1.5 of the 20 mg tab)      Review of external notes as documented elsewhere in note  40 minutes spent on the date of the encounter doing chart review, review of outside records, review of test results, interpretation of tests, patient visit and documentation       Return in about 3 months (around 7/6/2021) for Chronic disease management, am fasting.    Jackie Hitchcock, MAXX  [facility] - ANDRE Ross is a 60 year old who presents for the following health issues       Hyperlipidemia Follow-Up    Are you regularly taking any medication or supplement to lower your cholesterol?   Yes- atorvastatin    Are you having muscle aches or other side effects that you think could be caused by your cholesterol lowering medication?  No      Hypertension Follow-up    Do you check your blood pressure regularly outside of the clinic? Yes    Are you following a low salt diet? No    Are your blood pressures ever more than 140 on the top number (systolic) OR more   than 90 on the bottom number (diastolic), for example 140/90? Yes       History of Abnormal Stress Test - ASCVD  Saw Dr. Scruggs in in February, and was referred to [facility]      Cardiac  catheterization s/p DESx2 to the proximal to mid LAD      Cardiology Summary is attached below:    Eris Roger DO - 2021 11:30 AM CST  Formatting of this note might be different from the original.  Cardiology office consultation note ([place]):    Referring: Dr. Ricardo Scruggs (Internal Medicine)  Jackie Hitchcock CNP (PCP)    History: Patient is a 60-year-old female seen in cardiac consultation at this time because of recent abnormal nuclear stress test. Patient has been having episodes of occasional chest discomforts which have been a bit atypical in nature. Normal few episodes that occurred with exertion some of also occurred at rest for her, and they have not predictably occur necessarily with exertion. She has had quite elevated blood pressures as of recently with associated shortness of breath, and as result of this wound up having a nuclear stress test which was abnormal as noted below. It basically showed a small area of ischemia reported in the inferior apical segments of the LV, as well as some fixed abnormality in this area as well and normal LVEF. She has become quite distraught with the results of this and is very nervous over the fact that she may have some heart disease present. She is therefore here for further evaluation at this time. Her recent twelve-lead ECG from 2/3/2021 shows sinus rhythm and is within normal limits. Her recent nuclear stress test results are as noted below.    Results of cardiac testin.) Nuclear medicine Lexiscan stress test on 2021:     The nuclear stress test is abnormal.      There is a small area of ischemia in the inferior segment(s) of the   left ventricle adjacent to the apex.      There is a small area of infarction in the apical segment(s) of the   left ventricle.      The left ventricular ejection fraction at rest is 75%.  The left   ventricular ejection fraction at stress is 73%.      There is no prior study for comparison.    Exam:  General:  Awake and alert. No distress. Skin warm dry without diaphoresis.  Neck: No abnormal JVD. No carotid bruits. Normal carotid upstrokes.  Lungs: Clear bilaterally throughout both lung fields.  Heart: Regular rate and rhythm with no ectopy or dysrhythmia. Normal S1, S2. No distinct murmur, rub, click or gallop.  Extremities: No pitting edema or swelling. Warm to touch. No peripheral cyanosis. Pulses intact.    Impressions:  1.) Ongoing and recurrent, albeit somewhat atypical, chest pains as noted above.  2.) Abnormal nuclear stress test suggesting possible ongoing myocardial ischemia.  3.) Other medical condition status as noted above per PMH.    Plans and recommendations:  1.) Continue current therapies for now as presently doing for now.  2.) I have discussed further evaluation options with the patient in view of her ongoing symptoms. At this point she wants to definitely proceed with current angiography for definitive coronary evaluation and is quite nervous over the possibility of having underlying ischemic disease. We therefore arrange for cardiac catheterization with further recommendations to follow pending results.  3.) Continue current primary care follow-ups with primary care provider for continued lipid and other risk factor management.  4.) Cardiology office follow-up visit pending results of cardiac catheterization.      It appears she has refused cardiac rehab phase II        Depression and Anxiety Follow-Up    How are you doing with your depression since your last visit? Worsened and improved depending on different things     How are you doing with your anxiety since your last visit?  As above     Are you having other symptoms that might be associated with depression or anxiety? No    Have you had a significant life event? No     Do you have any concerns with your use of alcohol or other drugs? No         Recent breast MRI normal  UTD with Oncology visit      Social History     Tobacco Use     Smoking  status: Former Smoker     Packs/day: 0.10     Years: 30.00     Pack years: 3.00     Types: Cigarettes     Start date: 8/15/1974     Quit date: 3/3/2013     Years since quittin.0     Smokeless tobacco: Never Used   Substance Use Topics     Alcohol use: Yes     Drug use: No       PHQ 10/11/2019 2021 2021   PHQ-9 Total Score 3 9 3   Q9: Thoughts of better off dead/self-harm past 2 weeks Not at all Not at all Not at all       REUBEN-7 SCORE 10/11/2019 2021 2021   Total Score 1 13 4         Asthma Follow-Up  Was ACT completed today?    Yes    ACT Total Scores 2021   ACT TOTAL SCORE (Goal Greater than or Equal to 20) 21   In the past 12 months, how many times did you visit the emergency room for your asthma without being admitted to the hospital? 0   In the past 12 months, how many times were you hospitalized overnight because of your asthma? 0          How many days per week do you miss taking your asthma controller medication?  0    Please describe any recent triggers for your asthma: None    Have you had any Emergency Room Visits, Urgent Care Visits, or Hospital Admissions since your last office visit?  No        How many servings of fruits and vegetables do you eat daily?  0-1    On average, how many sweetened beverages do you drink each day (Examples: soda, juice, sweet tea, etc.  Do NOT count diet or artificially sweetened beverages)?   0    How many days per week do you exercise enough to make your heart beat faster? 3 or less    How many minutes a day do you exercise enough to make your heart beat faster? 9 or less    How many days per week do you miss taking your medication? 0      Pain behind left ear  Onset/Duration: Had for 3 weeks  Description:   Location: Left side  Radiation:   base of head on left side  Intensity: 8-9/10  Progression of Symptoms:  constant  Accompanying Signs & Symptoms:  Burning, tingling, prickly: Tingling, pain feels rolling  Numbness in arm(s)/Weakness in arm(s):  No    Fever: no  Headache: no  Nausea and/or vomiting: no  History:   Trauma: No  Previous neck pain: No  Previous surgery or injections: No  Previous Imaging (MRI,X ray): No  Precipitating or alleviating factors: Laying on left side takes the pressure off  Does movement impact the pain:  Pulling pain when turn the head to the right  Therapies tried and outcome: Tylenol PM helped to sleep, but pain is back; topical pain med for knee      Tender to touch      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     Ductal carcinoma in situ (DCIS) of right breast     Reactive airway disease that is not asthma     Contact dermatitis and other eczema, due to unspecified cause     Essential hypertension     Vitamin D deficiency     ACP (advance care planning)     Use of tamoxifen (Nolvadex)     Morbid obesity with body mass index (BMI) of 40.0 to 44.9 in adult (H)     Recurrent major depressive disorder (H)     History of COVID-19     Urinary, incontinence, stress female     ASCVD (arteriosclerotic cardiovascular disease)     Hx of heart artery stent     Past Surgical History:   Procedure Laterality Date     BIOPSY      right breast--neg     BIOPSY BREAST NEEDLE LOCALIZATION  2014    Procedure: BIOPSY BREAST NEEDLE LOCALIZATION;  WIRE LOCALIZED SEGMENTAL RESECTION, RIGHT BREAST;  Surgeon: Shanika Beaver MD;  Location: [facility]     CHOLECYSTECTOMY       COLONOSCOPY       COLONOSCOPY  2013     DILATION AND CURETTAGE, OPERATIVE HYSTEROSCOPY, COMBINED  2017    [facility]     ORTHOPEDIC SURGERY      heel surgery     ORTHOPEDIC SURGERY      rt knee meniiscus tear       Social History     Tobacco Use     Smoking status: Former Smoker     Packs/day: 0.10     Years: 30.00     Pack years: 3.00     Types: Cigarettes     Start date: 8/15/1974     Quit date: 3/3/2013     Years since quittin.0     Smokeless tobacco: Never Used   Substance Use Topics     Alcohol use: Yes      Family History   Problem Relation Age of Onset     C.A.D. Father      Coronary Artery Disease Father      Cerebrovascular Disease Father      Prostate Cancer Father      Hypertension Mother      Cerebrovascular Disease Mother      Breast Cancer Mother 55         age 71 left mastectomy     Thyroid Disease Other      Thyroid Disease Sister      Diabetes No family hx of      Asthma No family hx of      Hyperlipidemia No family hx of      Colon Cancer No family hx of      Other Cancer No family hx of      Anesthesia Reaction No family hx of      Genetic Disorder No family hx of            Current Outpatient Medications   Medication Sig Dispense Refill     acetaminophen (TYLENOL 8 HOUR ARTHRITIS PAIN) 650 MG CR tablet Take 650 mg by mouth every 8 hours as needed for mild pain or fever       albuterol (2.5 MG/3ML) 0.083% neb solution Take 1 vial (2.5 mg) by nebulization every 4 hours as needed for shortness of breath / dyspnea or wheezing 1 Box 3     albuterol (PROAIR HFA) 108 (90 Base) MCG/ACT inhaler Inhale 2 Puffs into the lungs every four hours as needed for shortness of breath / dyspnea or wheezing 8.5 g 1     amLODIPine (NORVASC) 5 MG tablet Take 5 mg by mouth       ASPIRIN PO Take 81 mg by mouth daily       atorvastatin (LIPITOR) 40 MG tablet Take 40 mg by mouth       calcium-vitamin D (CALTRATE) 600-400 MG-UNIT per tablet Take 1 tablet by mouth 2 times daily       clopidogrel (PLAVIX) 75 MG tablet Take 75 mg by mouth       cyanocobalamin ([employer] VITAMIN  B12) 1000 MCG TABS Take 1,000 mcg by mouth daily 30 tablet      doxylamine (UNISOM) 25 MG TABS tablet Take 25 mg by mouth nightly as needed 1-2 at HS as needed       escitalopram (LEXAPRO) 20 MG tablet 1.5 tabs daily for 30 mg 90 tablet 0     lisinopril (ZESTRIL) 40 MG tablet Take 1 tablet (40 mg) by mouth daily 90 tablet 1     Multiple Vitamin (DAILY MULTIVITAMIN PO) Take one tablet by oral route every day with food       nitroGLYcerin (NITROSTAT) 0.4 MG  sublingual tablet Place 0.4 mg under the tongue       nystatin (MYCOSTATIN) 579778 UNIT/GM POWD Apply 100,000 g topically 2 times daily as needed 60 g 3     Omega-3 Fatty Acids (OMEGA 3 PO) Take 1,000 mg by mouth daily        solifenacin (VESICARE) 5 MG tablet Take 1 tablet (5 mg) by mouth daily 90 tablet 1     UNABLE TO FIND MEDICATION NAME: phedermine 15mg take 1 tablet daily       VITAMIN D, CHOLECALCIFEROL, PO Take 5,000 Units by mouth daily        phentermine (ADIPEX-P) 37.5 MG tablet Take 18.75 mg by mouth         Allergies   Allergen Reactions     Adhesive Tape      Steri strips left scar.     Latex      Possible allergy.       Recent Labs   Lab Test 01/11/21  1617 08/14/19  1420 02/06/19  1541 02/20/18  1107 02/20/18  1107 03/02/15  1548 03/02/15  1548   LDL  --   --   --   --  99  --  80   HDL  --   --   --   --  50  --  45*   TRIG  --   --   --   --  113  --  175*   ALT 35 66* 30   < >  --    < > 30   CR 0.61 0.67 0.56   < >  --    < > 0.61   GFRESTIMATED >90 >90 >90   < >  --    < > >90  Non  GFR Calc     GFRESTBLACK >90 >90 >90   < >  --    < > >90   GFR Calc     POTASSIUM 3.7 3.8 3.6   < >  --    < > 3.9   TSH 2.90  --   --   --  0.95   < >  --     < > = values in this interval not displayed.        BP Readings from Last 3 Encounters:   04/06/21 128/78   02/02/21 (!) 162/92   01/15/21 (!) 160/78    Wt Readings from Last 3 Encounters:   04/06/21 124.7 kg (275 lb)   02/02/21 124.7 kg (275 lb)   01/15/21 124.1 kg (273 lb 9.5 oz)            Review of Systems   CONSTITUTIONAL: NEGATIVE for fever, chills, change in weight  INTEGUMENTARY/SKIN: NEGATIVE for worrisome rashes, moles or lesions  EYES: NEGATIVE for vision changes or irritation  ENT/MOUTH: NEGATIVE for ear, mouth and throat problems  RESP: NEGATIVE for significant cough or SOB  CV: NEGATIVE for chest pain, palpitations or peripheral edema  GI: NEGATIVE for nausea, abdominal pain, heartburn, or change in bowel  "habits  : NEGATIVE for frequency, dysuria, or hematuria  MUSCULOSKELETAL: NEGATIVE for significant arthralgias or myalgia; POSITIVE for R knee pain  NEURO: NEGATIVE for weakness, dizziness or paresthesias  PSYCHIATRIC: NEGATIVE for changes in mood or affect        Objective    /78 (BP Location: Right arm, Patient Position: Chair, Cuff Size: Adult Large)   Pulse 88   Temp 97.9  F (36.6  C) (Tympanic)   Ht 1.676 m (5' 6\")   Wt 124.7 kg (275 lb)   SpO2 98%   BMI 44.39 kg/m    Body mass index is 44.39 kg/m .         Physical Exam   GENERAL: healthy, alert and no distress  HENT: ear canals and TM's normal, nose and mouth without ulcers or lesions; Left side below ear/neck/base of head has no erythema, slightly swollen compared to right side, tender to palpation  NECK: no adenopathy, no asymmetry, masses, or scars and thyroid normal to palpation  RESP: lungs clear to auscultation - no rales, rhonchi or wheezes  CV: regular rate and rhythm, normal S1 S2, no S3 or S4, no murmur, click or rub, no peripheral edema and peripheral pulses strong  MS: Left lateral cervical tenderness - which is behind the ear- over the occiput - I can feel a tight band of muscle in this area.  Carotid arteries are without bruit, no lymphadenopathy noted  PSYCH: mentation appears normal, affect normal/bright            "

## 2021-03-30 ENCOUNTER — HOSPITAL ENCOUNTER (OUTPATIENT)
Dept: MRI IMAGING | Facility: HOSPITAL | Age: 61
Discharge: HOME | End: 2021-03-30
Attending: NURSE PRACTITIONER | Admitting: NURSE PRACTITIONER
Payer: COMMERCIAL

## 2021-03-30 PROCEDURE — 77049 MRI BREAST C-+ W/CAD BI: CPT

## 2021-03-30 PROCEDURE — A9585 GADOBUTROL INJECTION: HCPCS | Performed by: RADIOLOGY

## 2021-03-30 PROCEDURE — 255N000002 HC RX 255 OP 636: Performed by: RADIOLOGY

## 2021-03-30 RX ORDER — GADOBUTROL 604.72 MG/ML
10 INJECTION INTRAVENOUS ONCE
Status: COMPLETED | OUTPATIENT
Start: 2021-03-30 | End: 2021-03-30

## 2021-03-30 RX ADMIN — GADOBUTROL 10 ML: 604.72 INJECTION INTRAVENOUS at 13:03

## 2021-04-05 PROBLEM — I25.10 ASCVD (ARTERIOSCLEROTIC CARDIOVASCULAR DISEASE): Status: ACTIVE | Noted: 2021-04-05

## 2021-04-06 ENCOUNTER — OFFICE VISIT (OUTPATIENT)
Dept: FAMILY MEDICINE | Facility: OTHER | Age: 61
End: 2021-04-06
Attending: NURSE PRACTITIONER
Payer: COMMERCIAL

## 2021-04-06 VITALS
WEIGHT: 275 LBS | HEART RATE: 88 BPM | SYSTOLIC BLOOD PRESSURE: 128 MMHG | OXYGEN SATURATION: 98 % | TEMPERATURE: 97.9 F | BODY MASS INDEX: 44.2 KG/M2 | HEIGHT: 66 IN | DIASTOLIC BLOOD PRESSURE: 78 MMHG

## 2021-04-06 DIAGNOSIS — E55.9 VITAMIN D DEFICIENCY: ICD-10-CM

## 2021-04-06 DIAGNOSIS — M54.2 CERVICALGIA: ICD-10-CM

## 2021-04-06 DIAGNOSIS — I25.10 ASCVD (ARTERIOSCLEROTIC CARDIOVASCULAR DISEASE): ICD-10-CM

## 2021-04-06 DIAGNOSIS — F33.0 MILD EPISODE OF RECURRENT MAJOR DEPRESSIVE DISORDER (H): ICD-10-CM

## 2021-04-06 DIAGNOSIS — I10 ESSENTIAL HYPERTENSION: Primary | ICD-10-CM

## 2021-04-06 DIAGNOSIS — N39.3 URINARY, INCONTINENCE, STRESS FEMALE: ICD-10-CM

## 2021-04-06 DIAGNOSIS — F33.8 SEASONAL AFFECTIVE DISORDER (H): ICD-10-CM

## 2021-04-06 PROBLEM — R07.2 PRECORDIAL PAIN: Status: RESOLVED | Noted: 2021-02-26 | Resolved: 2021-04-06

## 2021-04-06 PROBLEM — Z95.5 HX OF HEART ARTERY STENT: Status: ACTIVE | Noted: 2021-04-06

## 2021-04-06 LAB
ALBUMIN SERPL-MCNC: 4.3 G/DL (ref 3.4–5)
ALBUMIN UR-MCNC: NEGATIVE MG/DL
ALP SERPL-CCNC: 72 U/L (ref 40–150)
ALT SERPL W P-5'-P-CCNC: 47 U/L (ref 0–50)
ANION GAP SERPL CALCULATED.3IONS-SCNC: 6 MMOL/L (ref 3–14)
APPEARANCE UR: CLEAR
AST SERPL W P-5'-P-CCNC: 24 U/L (ref 0–45)
BASOPHILS # BLD AUTO: 0 10E9/L (ref 0–0.2)
BASOPHILS NFR BLD AUTO: 0.2 %
BILIRUB SERPL-MCNC: 0.6 MG/DL (ref 0.2–1.3)
BILIRUB UR QL STRIP: NEGATIVE
BUN SERPL-MCNC: 17 MG/DL (ref 7–30)
CALCIUM SERPL-MCNC: 9.3 MG/DL (ref 8.5–10.1)
CHLORIDE SERPL-SCNC: 101 MMOL/L (ref 94–109)
CHOLEST SERPL-MCNC: 174 MG/DL
CO2 SERPL-SCNC: 28 MMOL/L (ref 20–32)
COLOR UR AUTO: YELLOW
CREAT SERPL-MCNC: 0.58 MG/DL (ref 0.52–1.04)
DIFFERENTIAL METHOD BLD: NORMAL
EOSINOPHIL # BLD AUTO: 0.2 10E9/L (ref 0–0.7)
EOSINOPHIL NFR BLD AUTO: 3 %
ERYTHROCYTE [DISTWIDTH] IN BLOOD BY AUTOMATED COUNT: 13.4 % (ref 10–15)
GFR SERPL CREATININE-BSD FRML MDRD: >90 ML/MIN/{1.73_M2}
GLUCOSE SERPL-MCNC: 97 MG/DL (ref 70–99)
GLUCOSE UR STRIP-MCNC: NEGATIVE MG/DL
HCT VFR BLD AUTO: 43.5 % (ref 35–47)
HDLC SERPL-MCNC: 59 MG/DL
HGB BLD-MCNC: 14.3 G/DL (ref 11.7–15.7)
HGB UR QL STRIP: NEGATIVE
KETONES UR STRIP-MCNC: NEGATIVE MG/DL
LDLC SERPL CALC-MCNC: 90 MG/DL
LEUKOCYTE ESTERASE UR QL STRIP: NEGATIVE
LYMPHOCYTES # BLD AUTO: 1.9 10E9/L (ref 0.8–5.3)
LYMPHOCYTES NFR BLD AUTO: 31.6 %
MCH RBC QN AUTO: 29.4 PG (ref 26.5–33)
MCHC RBC AUTO-ENTMCNC: 32.9 G/DL (ref 31.5–36.5)
MCV RBC AUTO: 89 FL (ref 78–100)
MONOCYTES # BLD AUTO: 0.5 10E9/L (ref 0–1.3)
MONOCYTES NFR BLD AUTO: 8.1 %
NEUTROPHILS # BLD AUTO: 3.5 10E9/L (ref 1.6–8.3)
NEUTROPHILS NFR BLD AUTO: 57.1 %
NITRATE UR QL: NEGATIVE
NONHDLC SERPL-MCNC: 115 MG/DL
PH UR STRIP: 6 PH (ref 5–7)
PLATELET # BLD AUTO: 296 10E9/L (ref 150–450)
POTASSIUM SERPL-SCNC: 3.7 MMOL/L (ref 3.4–5.3)
PROT SERPL-MCNC: 8.6 G/DL (ref 6.8–8.8)
RBC # BLD AUTO: 4.87 10E12/L (ref 3.8–5.2)
SODIUM SERPL-SCNC: 135 MMOL/L (ref 133–144)
SOURCE: NORMAL
SP GR UR STRIP: 1.02 (ref 1–1.03)
TRIGL SERPL-MCNC: 123 MG/DL
TSH SERPL DL<=0.005 MIU/L-ACNC: 0.87 MU/L (ref 0.4–4)
UROBILINOGEN UR STRIP-ACNC: 0.2 EU/DL (ref 0.2–1)
WBC # BLD AUTO: 6.1 10E9/L (ref 4–11)

## 2021-04-06 PROCEDURE — 99215 OFFICE O/P EST HI 40 MIN: CPT | Performed by: NURSE PRACTITIONER

## 2021-04-06 PROCEDURE — 81003 URINALYSIS AUTO W/O SCOPE: CPT | Performed by: NURSE PRACTITIONER

## 2021-04-06 PROCEDURE — 82306 VITAMIN D 25 HYDROXY: CPT | Performed by: NURSE PRACTITIONER

## 2021-04-06 PROCEDURE — 36415 COLL VENOUS BLD VENIPUNCTURE: CPT | Performed by: NURSE PRACTITIONER

## 2021-04-06 PROCEDURE — 80050 GENERAL HEALTH PANEL: CPT | Performed by: NURSE PRACTITIONER

## 2021-04-06 PROCEDURE — 80061 LIPID PANEL: CPT | Performed by: NURSE PRACTITIONER

## 2021-04-06 RX ORDER — NITROGLYCERIN 0.4 MG/1
0.4 TABLET SUBLINGUAL
COMMUNITY
Start: 2021-03-09 | End: 2022-12-16

## 2021-04-06 RX ORDER — AMLODIPINE BESYLATE 5 MG/1
5 TABLET ORAL
COMMUNITY
Start: 2021-03-09 | End: 2021-10-13

## 2021-04-06 RX ORDER — ESCITALOPRAM OXALATE 20 MG/1
TABLET ORAL
Qty: 90 TABLET | Refills: 0 | Status: SHIPPED | OUTPATIENT
Start: 2021-04-06 | End: 2021-06-10

## 2021-04-06 RX ORDER — SOLIFENACIN SUCCINATE 5 MG/1
5 TABLET, FILM COATED ORAL DAILY
Qty: 90 TABLET | Refills: 1 | Status: SHIPPED | OUTPATIENT
Start: 2021-04-06 | End: 2021-08-25

## 2021-04-06 RX ORDER — CLOPIDOGREL BISULFATE 75 MG/1
75 TABLET ORAL
COMMUNITY
Start: 2021-03-09 | End: 2021-10-13

## 2021-04-06 RX ORDER — ATORVASTATIN CALCIUM 40 MG/1
40 TABLET, FILM COATED ORAL
COMMUNITY
Start: 2021-03-09 | End: 2021-10-13

## 2021-04-06 ASSESSMENT — MIFFLIN-ST. JEOR: SCORE: 1829.14

## 2021-04-06 ASSESSMENT — ASTHMA QUESTIONNAIRES
QUESTION_1 LAST FOUR WEEKS HOW MUCH OF THE TIME DID YOUR ASTHMA KEEP YOU FROM GETTING AS MUCH DONE AT WORK, SCHOOL OR AT HOME: NONE OF THE TIME
QUESTION_5 LAST FOUR WEEKS HOW WOULD YOU RATE YOUR ASTHMA CONTROL: SOMEWHAT CONTROLLED
ACT_TOTALSCORE: 21
QUESTION_3 LAST FOUR WEEKS HOW OFTEN DID YOUR ASTHMA SYMPTOMS (WHEEZING, COUGHING, SHORTNESS OF BREATH, CHEST TIGHTNESS OR PAIN) WAKE YOU UP AT NIGHT OR EARLIER THAN USUAL IN THE MORNING: NOT AT ALL
QUESTION_4 LAST FOUR WEEKS HOW OFTEN HAVE YOU USED YOUR RESCUE INHALER OR NEBULIZER MEDICATION (SUCH AS ALBUTEROL): NOT AT ALL
QUESTION_2 LAST FOUR WEEKS HOW OFTEN HAVE YOU HAD SHORTNESS OF BREATH: THREE TO SIX TIMES A WEEK

## 2021-04-06 ASSESSMENT — PATIENT HEALTH QUESTIONNAIRE - PHQ9: SUM OF ALL RESPONSES TO PHQ QUESTIONS 1-9: 3

## 2021-04-06 ASSESSMENT — ANXIETY QUESTIONNAIRES
3. WORRYING TOO MUCH ABOUT DIFFERENT THINGS: MORE THAN HALF THE DAYS
GAD7 TOTAL SCORE: 4
2. NOT BEING ABLE TO STOP OR CONTROL WORRYING: SEVERAL DAYS
IF YOU CHECKED OFF ANY PROBLEMS ON THIS QUESTIONNAIRE, HOW DIFFICULT HAVE THESE PROBLEMS MADE IT FOR YOU TO DO YOUR WORK, TAKE CARE OF THINGS AT HOME, OR GET ALONG WITH OTHER PEOPLE: SOMEWHAT DIFFICULT
7. FEELING AFRAID AS IF SOMETHING AWFUL MIGHT HAPPEN: NOT AT ALL
6. BECOMING EASILY ANNOYED OR IRRITABLE: NOT AT ALL
5. BEING SO RESTLESS THAT IT IS HARD TO SIT STILL: NOT AT ALL
4. TROUBLE RELAXING: NOT AT ALL
1. FEELING NERVOUS, ANXIOUS, OR ON EDGE: SEVERAL DAYS

## 2021-04-06 ASSESSMENT — PAIN SCALES - GENERAL: PAINLEVEL: MILD PAIN (2)

## 2021-04-06 NOTE — PATIENT INSTRUCTIONS
Assessment & Plan       Essential hypertension  - Lipid Profile (Chol, Trig, HDL, LDL calc)  - Comprehensive metabolic panel (BMP + Alb, Alk Phos, ALT, AST, Total. Bili, TP)  - TSH with free T4 reflex  - CBC with platelets and differential  - UA reflex to Microscopic and Culture - [facility]    Mild episode of recurrent major depressive disorder (H)  - Increase Lexapro to 30 mg daily (1.5 of the 20 mg tab)    Vitamin D deficiency  - Vitamin D level    ASCVD (arteriosclerotic cardiovascular disease)  - Continue plan of care    Cervicalgia  - Ice  - Topical preparation  - Massage  - Consider PT  - Follow-up if unimproved    Incontinence of urine  - discontinue Detrol - not effective  - Try Vesicare  - Follow-up as needed      Return in about 3 months (around 7/6/2021) for Chronic disease management, am fasting.    Jackie Hitchcock, MAXX  [facility]

## 2021-04-06 NOTE — NURSING NOTE
"Chief Complaint   Patient presents with     Lipids     Hypertension     Depression     Anxiety       Initial /78 (BP Location: Right arm, Patient Position: Chair, Cuff Size: Adult Large)   Pulse 88   Temp 97.9  F (36.6  C) (Tympanic)   Ht 1.676 m (5' 6\")   Wt 124.7 kg (275 lb)   SpO2 98%   BMI 44.39 kg/m   Estimated body mass index is 44.39 kg/m  as calculated from the following:    Height as of this encounter: 1.676 m (5' 6\").    Weight as of this encounter: 124.7 kg (275 lb).  Medication Reconciliation: complete  Sosa Springer LPN    "

## 2021-04-06 NOTE — LETTER
My Depression Action Plan  Name: Vandana Jaeger   Date of Birth 1960  Date: 4/6/2021    My doctor: Jackie Hitchcock   My clinic: [facility]  [address]  [place] [address]  [phone]          GREEN    ZONE   Good Control    What it looks like:     Things are going generally well. You have normal ups and downs. You may even feel depressed from time to time, but bad moods usually last less than a day.   What you need to do:  1. Continue to care for yourself (see self care plan)  2. Check your depression survival kit and update it as needed  3. Follow your physician s recommendations including any medication.  4. Do not stop taking medication unless you consult with your physician first.           YELLOW         ZONE Getting Worse    What it looks like:     Depression is starting to interfere with your life.     It may be hard to get out of bed; you may be starting to isolate yourself from others.    Symptoms of depression are starting to last most all day and this has happened for several days.     You may have suicidal thoughts but they are not constant.   What you need to do:     1. Call your care team. Your response to treatment will improve if you keep your care team informed of your progress. Yellow periods are signs an adjustment may need to be made.     2. Continue your self-care.  Just get dressed and ready for the day.  Don't give yourself time to talk yourself out of it.    3. Talk to someone in your support network.    4. Open up your Depression Self-Care Plan/Wellness Kit.           RED    ZONE Medical Alert - Get Help    What it looks like:     Depression is seriously interfering with your life.     You may experience these or other symptoms: You can t get out of bed most days, can t work or engage in other necessary activities, you have trouble taking care of basic hygiene, or basic responsibilities, thoughts of suicide or death that will not go  away, self-injurious behavior.     What you need to do:  1. Call your care team and request a same-day appointment. If they are not available (weekends or after hours) call your local crisis line, emergency room or 911.          Depression Self-Care Plan / Wellness Kit    Many people find that medication and therapy are helpful treatments for managing depression. In addition, making small changes to your everyday life can help to boost your mood and improve your wellbeing. Below are some tips for you to consider. Be sure to talk with your medical provider and/or behavioral health consultant if your symptoms are worsening or not improving.     Sleep   Sleep hygiene  means all of the habits that support good, restful sleep. It includes maintaining a consistent bedtime and wake time, using your bedroom only for sleeping or sex, and keeping the bedroom dark and free of distractions like a computer, smartphone, or television.     Develop a Healthy Routine  Maintain good hygiene. Get out of bed in the morning, make your bed, brush your teeth, take a shower, and get dressed. Don t spend too much time viewing media that makes you feel stressed. Find time to relax each day.    Exercise  Get some form of exercise every day. This will help reduce pain and release endorphins, the  feel good  chemicals in your brain. It can be as simple as just going for a walk or doing some gardening, anything that will get you moving.      Diet  Strive to eat healthy foods, including fruits and vegetables. Drink plenty of water. Avoid excessive sugar, caffeine, alcohol, and other mood-altering substances.     Stay Connected with Others  Stay in touch with friends and family members.    Manage Your Mood  Try deep breathing, massage therapy, biofeedback, or meditation. Take part in fun activities when you can. Try to find something to smile about each day.     Psychotherapy  Be open to working with a therapist if your provider recommends it.      Medication  Be sure to take your medication as prescribed. Most anti-depressants need to be taken every day. It usually takes several weeks for medications to work. Not all medicines work for all people. It is important to follow-up with your provider to make sure you have a treatment plan that is working for you. Do not stop your medication abruptly without first discussing it with your provider.    Crisis Resources   These hotlines are for both adults and children. They and are open 24 hours a day, 7 days a week unless noted otherwise.      National Suicide Prevention Lifeline   6-264-438-TALK (0884)      Crisis Text Line    www.crisistextline.org  Text HOME to 357754 from anywhere in the [place], anytime, about any type of crisis. A live, trained crisis counselor will receive the text and respond quickly.      [employer] Lifeline for LGBTQ Youth  A national crisis intervention and suicide lifeline for LGBTQ youth under 25. Provides a safe place to talk without judgement. Call 1-641.561.5176; text START to 862371 or visit www.thetrevorproject.org to talk to a trained counselor.      For [facility] crisis numbers, visit the [facility] website at:  https://[place].gov/dhs/people-we-serve/adults/health-care/mental-health/resources/crisis-contacts.jsp

## 2021-04-06 NOTE — LETTER
My Asthma Action Plan    Name: Vandana Jaeger   YOB: 1960  Date: 3/25/2021   My doctor: Jacike Hitchcock CNP   My clinic: [facility]        My Rescue Medicine:   Albuterol inhaler (Proair/Ventolin/Proventil HFA)  2-4 puffs EVERY 4 HOURS as needed. Use a spacer if recommended by your provider.   My Asthma Severity:   Intermittent / Exercise Induced  Know your asthma triggers:              GREEN ZONE   Good Control    I feel good    No cough or wheeze    Can work, sleep and play without asthma symptoms       Take your asthma control medicine every day.     1. If exercise triggers your asthma, take your rescue medication    15 minutes before exercise or sports, and    During exercise if you have asthma symptoms  2. Spacer to use with inhaler: If you have a spacer, make sure to use it with your inhaler             YELLOW ZONE Getting Worse  I have ANY of these:    I do not feel good    Cough or wheeze    Chest feels tight    Wake up at night   1. Keep taking your Green Zone medications  2. Start taking your rescue medicine:    every 20 minutes for up to 1 hour. Then every 4 hours for 24-48 hours.  3. If you stay in the Yellow Zone for more than 12-24 hours, contact your doctor.  4. If you do not return to the Green Zone in 12-24 hours or you get worse, start taking your oral steroid medicine if prescribed by your provider.           RED ZONE Medical Alert - Get Help  I have ANY of these:    I feel awful    Medicine is not helping    Breathing getting harder    Trouble walking or talking    Nose opens wide to breathe       1. Take your rescue medicine NOW  2. If your provider has prescribed an oral steroid medicine, start taking it NOW  3. Call your doctor NOW  4. If you are still in the Red Zone after 20 minutes and you have not reached your doctor:    Take your rescue medicine again and    Call 911 or go to the emergency room right away    See your regular doctor within 2 weeks of an  Emergency Room or Urgent Care visit for follow-up treatment.          Annual Reminders:  Meet with Asthma Educator,  Flu Shot in the Fall, consider Pneumonia Vaccination for patients with asthma (aged 19 and older).    Pharmacy: [facility] - [place], [place] - [address] [place] [address] AT [facility]    Electronically signed by Jackie Hitchcock CNP   Date: 03/25/21                    Asthma Triggers  How To Control Things That Make Your Asthma Worse    Triggers are things that make your asthma worse.  Look at the list below to help you find your triggers and   what you can do about them. You can help prevent asthma flare-ups by staying away from your triggers.      Trigger                                                          What you can do   Cigarette Smoke  Tobacco smoke can make asthma worse. Do not allow smoking in your home, car or around you.  Be sure no one smokes at a child s day care or school.  If you smoke, ask your health care provider for ways to help you quit.  Ask family members to quit too.  Ask your health care provider for a referral to Quit Plan to help you quit smoking, or call 9-900-327-PLAN.     Colds, Flu, Bronchitis  These are common triggers of asthma. Wash your hands often.  Don t touch your eyes, nose or mouth.  Get a flu shot every year.     Dust Mites  These are tiny bugs that live in cloth or carpet. They are too small to see. Wash sheets and blankets in hot water every week.   Encase pillows and mattress in dust mite proof covers.  Avoid having carpet if you can. If you have carpet, vacuum weekly.   Use a dust mask and HEPA vacuum.   Pollen and Outdoor Mold  Some people are allergic to trees, grass, or weed pollen, or molds. Try to keep your windows closed.  Limit time out doors when pollen count is high.   Ask you health care provider about taking medicine during allergy season.     Animal Dander  Some people are allergic to skin flakes, urine or saliva from pets  with fur or feathers. Keep pets with fur or feathers out of your home.    If you can t keep the pet outdoors, then keep the pet out of your bedroom.  Keep the bedroom door closed.  Keep pets off cloth furniture and away from stuffed toys.     Mice, Rats, and Cockroaches  Some people are allergic to the waste from these pests.   Cover food and garbage.  Clean up spills and food crumbs.  Store grease in the refrigerator.   Keep food out of the bedroom.   Indoor Mold  This can be a trigger if your home has high moisture. Fix leaking faucets, pipes, or other sources of water.   Clean moldy surfaces.  Dehumidify basement if it is damp and smelly.   Smoke, Strong Odors, and Sprays  These can reduce air quality. Stay away from strong odors and sprays, such as perfume, powder, hair spray, paints, smoke incense, paint, cleaning products, candles and new carpet.   Exercise or Sports  Some people with asthma have this trigger. Be active!  Ask your doctor about taking medicine before sports or exercise to prevent symptoms.    Warm up for 5-10 minutes before and after sports or exercise.     Other Triggers of Asthma  Cold air:  Cover your nose and mouth with a scarf.  Sometimes laughing or crying can be a trigger.  Some medicines and food can trigger asthma.

## 2021-04-07 LAB — DEPRECATED CALCIDIOL+CALCIFEROL SERPL-MC: 62 UG/L (ref 20–75)

## 2021-04-07 ASSESSMENT — ANXIETY QUESTIONNAIRES: GAD7 TOTAL SCORE: 4

## 2021-04-07 ASSESSMENT — ASTHMA QUESTIONNAIRES: ACT_TOTALSCORE: 21

## 2021-05-09 ENCOUNTER — TRANSFERRED RECORDS (OUTPATIENT)
Dept: HEALTH INFORMATION MANAGEMENT | Facility: CLINIC | Age: 61
End: 2021-05-09

## 2021-05-11 ENCOUNTER — TRANSFERRED RECORDS (OUTPATIENT)
Dept: HEALTH INFORMATION MANAGEMENT | Facility: CLINIC | Age: 61
End: 2021-05-11

## 2021-06-08 DIAGNOSIS — I10 BENIGN ESSENTIAL HYPERTENSION: ICD-10-CM

## 2021-06-08 DIAGNOSIS — F33.8 SEASONAL AFFECTIVE DISORDER (H): ICD-10-CM

## 2021-06-08 RX ORDER — LISINOPRIL 40 MG/1
40 TABLET ORAL DAILY
Qty: 90 TABLET | Refills: 1 | Status: SHIPPED | OUTPATIENT
Start: 2021-06-08 | End: 2021-06-10

## 2021-06-08 NOTE — TELEPHONE ENCOUNTER
Lisinopril 40 mg      Last Written Prescription Date:  1/11/21  Last Fill Quantity: 90,   # refills: 1  Last Office Visit: 4/06/21  Future Office visit:    Next 5 appointments (look out 90 days)    Jul 07, 2021  9:45 AM  (Arrive by 9:30 AM)  SHORT with Jackie Hitchcock CNP  [facility] ([facility] ) [address]  [facility] [address]  [phone]           Routing refill request to provider for review/approval because:  Drug not on the [facility], [facility] or [facility] refill protocol or controlled substance

## 2021-06-09 DIAGNOSIS — F33.8 SEASONAL AFFECTIVE DISORDER (H): ICD-10-CM

## 2021-06-09 DIAGNOSIS — I10 BENIGN ESSENTIAL HYPERTENSION: ICD-10-CM

## 2021-06-10 RX ORDER — ESCITALOPRAM OXALATE 20 MG/1
30 TABLET ORAL DAILY
Qty: 135 TABLET | Refills: 0 | Status: SHIPPED | OUTPATIENT
Start: 2021-06-10 | End: 2021-07-22

## 2021-06-10 RX ORDER — LISINOPRIL 40 MG/1
TABLET ORAL
Qty: 90 TABLET | Refills: 0 | Status: SHIPPED | OUTPATIENT
Start: 2021-06-10 | End: 2021-09-02

## 2021-06-10 NOTE — TELEPHONE ENCOUNTER
Escitalopram  Last Written Prescription Date:  04/06/2021  Last Fill Quantity: 90,   # refills: 0  Last Office Visit: 04/06/2021  Future Office visit:    Next 5 appointments (look out 90 days)    Jul 07, 2021  9:45 AM  (Arrive by 9:30 AM)  SHORT with Jackie Hitchcock CNP  [facility] ([facility] ) [address]  [place] [address]  [phone]           Lisinopril  Filled on 06/08/2021

## 2021-06-17 RX ORDER — ESCITALOPRAM OXALATE 20 MG/1
TABLET ORAL
Qty: 90 TABLET | Refills: 0 | OUTPATIENT
Start: 2021-06-17

## 2021-07-12 ENCOUNTER — TELEPHONE (OUTPATIENT)
Dept: FAMILY MEDICINE | Facility: OTHER | Age: 61
End: 2021-07-12
Payer: COMMERCIAL

## 2021-07-12 ENCOUNTER — TELEPHONE (OUTPATIENT)
Dept: FAMILY MEDICINE | Facility: OTHER | Age: 61
End: 2021-07-12

## 2021-07-12 NOTE — TELEPHONE ENCOUNTER
"Pt was transferred to this writer, states that she needs a pre-op appt. Explained to pt that she was not transferred to the correct place, attempted to transfer pt to scheduling however pt is immediately irritable with this writer. States that she has used almost all the minutes on her phone. States that she is having surgery next week and \"I just need an appointment with Jackie Hitchcock.\" Again, advised pt that unfortunately this writer could not make these appts but would gladly transfer her to scheduling. Pt states \"Oh shut up!\" and then hangs up on this writer.   "

## 2021-07-12 NOTE — TELEPHONE ENCOUNTER
Please call the patient back.  She needs to know when she should go off her plavix before her surgery in [facility].

## 2021-07-12 NOTE — TELEPHONE ENCOUNTER
Left message for pt to return call to clinic, if she needs an appt for a pre-op, OK to overbook any day this week.

## 2021-07-12 NOTE — TELEPHONE ENCOUNTER
Left message for pt, she should reach out to her surgeon's office or surgery department at the facility in regards to holding the medication.

## 2021-07-12 NOTE — TELEPHONE ENCOUNTER
To: nurse to Jackie Hitchcock  Patient has an upcoming surgery and would like to discuss her medications.  Please call her back @ [phone].  Thank you

## 2021-07-13 NOTE — PROGRESS NOTES
[facility]  [address]  [place] [address]  Phone: [phone]  Primary Provider: Jackie Markham  Pre-op Performing Provider: JACKIE MARKHAM      PREOPERATIVE EVALUATION:  Today's date: 7/14/2021      Vandana Jaeger is a 61 year old female who presents for a preoperative evaluation.  Surgical Information:  Surgery/Procedure: Right TKA  Surgery Location: [facility]  Surgeon: Dr Escamilla  Surgery Date: 7/22/21  Time of Surgery: tbd  Where patient plans to recover: At home with family  Fax number for surgical facility: On File  Type of Anesthesia Anticipated: to be determined        Subjective     HPI related to upcoming procedure:     Osteoarthritis of the Right knee      Preop Questions 7/14/2021   1. Have you ever had a heart attack or stroke? No   2. Have you ever had surgery on your heart or blood vessels, such as a stent placement, a coronary artery bypass, or surgery on an artery in your head, neck, heart, or legs? YES    3. Do you have chest pain with activity? No   4. Do you have a history of  heart failure? No   5. Do you currently have a cold, bronchitis or symptoms of other infection? No   6. Do you have a cough, shortness of breath, or wheezing? No   7. Do you or anyone in your family have previous history of blood clots? No   8. Do you or does anyone in your family have a serious bleeding problem such as prolonged bleeding following surgeries or cuts? No   9. Have you ever had problems with anemia or been told to take iron pills? No   10. Have you had any abnormal blood loss such as black, tarry or bloody stools, or abnormal vaginal bleeding? No   11. Have you ever had a blood transfusion? No   12. Are you willing to have a blood transfusion if it is medically needed before, during, or after your surgery? Yes   13. Have you or any of your relatives ever had problems with anesthesia? No   14. Do you have sleep apnea, excessive snoring or daytime drowsiness? No   15. Do you  have any artifical heart valves or other implanted medical devices like a pacemaker, defibrillator, or continuous glucose monitor? No   16. Do you have artificial joints? No   17. Are you allergic to latex? No       Health Care Directive:  Patient does not have a Health Care Directive or Living Will: Discussed advance care planning with patient; however, patient declined at this time.        Status of Chronic Conditions:  See problem list for active medical problems.  Problems all longstanding and stable, except as noted/documented.  See ROS for pertinent symptoms related to these conditions.        Review of Systems  CONSTITUTIONAL: NEGATIVE for fever, chills, change in weight  INTEGUMENTARY/SKIN: NEGATIVE for worrisome rashes, moles or lesions  EYES: NEGATIVE for vision changes or irritation  ENT/MOUTH: NEGATIVE for ear, mouth and throat problems  RESP: NEGATIVE for significant cough or SOB  CV: NEGATIVE for chest pain, palpitations or peripheral edema  GI: NEGATIVE for nausea, abdominal pain, heartburn, or change in bowel habits  : NEGATIVE for frequency, dysuria, or hematuria  MUSCULOSKELETAL:Right knee pain, stiffness.  Altered gait  NEURO: NEGATIVE for weakness, dizziness or paresthesias  ENDOCRINE: NEGATIVE for temperature intolerance, skin/hair changes  HEME: NEGATIVE for bleeding problems  PSYCHIATRIC: NEGATIVE for changes in mood or affect        Patient Active Problem List    Diagnosis Date Noted     Hx of heart artery stent 04/06/2021     Priority: Medium     ASCVD (arteriosclerotic cardiovascular disease) 04/05/2021     Priority: Medium     History of COVID-19 01/11/2021     Priority: Medium     Urinary, incontinence, stress female 01/11/2021     Priority: Medium     Recurrent major depressive disorder (H) 05/21/2019     Priority: Medium     Use of tamoxifen (Nolvadex) 02/20/2018     Priority: Medium     ACP (advance care planning) 08/09/2016     Priority: Medium     Advance Care Planning 8/9/2016:  ACP Review of Chart / Resources Provided:  Reviewed chart for advance care plan.  Vandana Jaeger has been provided information and resources to begin or update their advance care plan.  Added by Alisson Whiting             Vitamin D deficiency 01/26/2016     Priority: Medium     Essential hypertension 11/24/2015     Priority: Medium     Disorder of refraction and accommodation 04/02/2015     Priority: Medium     Formatting of this note might be different from the original.  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  Goal weight xxx     BMR 1983    Calorie goal: 1700,  Carb goal: 50 net, Protein goal:     Initial visit 06/05/18  Initial Weight: (!) 138.6 kg (305 lb 8.9 oz)    Initial Body mass index is 49.32 kg/m .  Goal weight 150#    BMR 1983    Calorie goal: 1700,  Carb goal: 50 net, Protein goal:        Chondromalacia of patella 08/02/2010     Priority: Medium     Formatting of this note might be different from the original.  IMO Update 10/11       Medial meniscus tear 08/02/2010     Priority: Medium     Formatting of this note might be different from the original.  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 Provider to review     Recurrent major depressive disorder (H) 5/21/2019     Unspecified immunity deficiency      Urinary, incontinence, stress female 1/11/2021     Vitamin D deficiency 1/26/2016       Past Surgical History:   Procedure Laterality Date     BIOPSY      right breast--neg     BIOPSY BREAST NEEDLE LOCALIZATION  2/13/2014    Procedure: BIOPSY BREAST NEEDLE LOCALIZATION;  WIRE LOCALIZED SEGMENTAL RESECTION, RIGHT BREAST;  Surgeon: Shanika Beaver MD;  Location: [facility]     CHOLECYSTECTOMY  2010     COLONOSCOPY  2009     COLONOSCOPY  11/12/2013     DILATION AND CURETTAGE, OPERATIVE HYSTEROSCOPY, COMBINED  03/2017    [facility]     ORTHOPEDIC SURGERY      heel surgery     ORTHOPEDIC SURGERY      rt knee meniiscus tear       Current Outpatient Medications   Medication Sig Dispense Refill     acetaminophen (TYLENOL 8 HOUR ARTHRITIS PAIN) 650 MG CR tablet Take 650 mg by mouth every 8 hours as needed for mild pain or fever       albuterol (2.5 MG/3ML) 0.083% neb solution Take 1 vial (2.5 mg) by nebulization every 4 hours as needed for shortness of breath / dyspnea or wheezing 1 Box 3     albuterol (PROAIR HFA) 108 (90 Base) MCG/ACT inhaler Inhale 2 Puffs into the lungs every four hours as needed for shortness of breath / dyspnea or wheezing 8.5 g 1     amLODIPine (NORVASC) 5 MG tablet Take 5 mg by mouth       ASPIRIN PO Take 81 mg by mouth daily       atorvastatin (LIPITOR) 40 MG tablet Take 40 mg by mouth        calcium-vitamin D (CALTRATE) 600-400 MG-UNIT per tablet Take 1 tablet by mouth 2 times daily       clopidogrel (PLAVIX) 75 MG tablet Take 75 mg by mouth       cyanocobalamin ([employer] VITAMIN  B12) 1000 MCG TABS Take 1,000 mcg by mouth daily 30 tablet      doxylamine (UNISOM) 25 MG TABS tablet Take 25 mg by mouth nightly as needed 1-2 at HS as needed       escitalopram (LEXAPRO) 20 MG tablet Take 1.5 tablets (30 mg) by mouth daily 135 tablet 0     lisinopril (ZESTRIL) 40 MG tablet Take 1 Tablet by mouth one time a day. 90 tablet 0     Multiple Vitamin (DAILY MULTIVITAMIN PO) Take one tablet by oral route every day with food       nitroGLYcerin (NITROSTAT) 0.4 MG sublingual tablet Place 0.4 mg under the tongue       nystatin (MYCOSTATIN) 022735 UNIT/GM POWD Apply 100,000 g topically 2 times daily as needed 60 g 3     Omega-3 Fatty Acids (OMEGA 3 PO) Take 1,000 mg by mouth daily        solifenacin (VESICARE) 5 MG tablet Take 1 tablet (5 mg) by mouth daily 90 tablet 1     UNABLE TO FIND MEDICATION NAME: phedermine 15mg take 1 tablet daily       VITAMIN D, CHOLECALCIFEROL, PO Take 5,000 Units by mouth daily            Allergies   Allergen Reactions     Adhesive Tape      Steri strips left scar.     Latex      Possible allergy.        Social History     Tobacco Use     Smoking status: Former Smoker     Packs/day: 0.10     Years: 30.00     Pack years: 3.00     Types: Cigarettes     Start date: 8/15/1974     Quit date: 3/3/2013     Years since quittin.3     Smokeless tobacco: Never Used   Substance Use Topics     Alcohol use: Yes       Family History   Problem Relation Age of Onset     C.A.D. Father      Coronary Artery Disease Father      Cerebrovascular Disease Father      Prostate Cancer Father      Hypertension Mother      Cerebrovascular Disease Mother      Breast Cancer Mother 55         age 71 left mastectomy     Thyroid Disease Other      Thyroid Disease Sister      Diabetes No family hx of      Asthma No family  "hx of      Hyperlipidemia No family hx of      Colon Cancer No family hx of      Other Cancer No family hx of      Anesthesia Reaction No family hx of      Genetic Disorder No family hx of      History   Drug Use No           Objective     /64 (BP Location: Right arm, Patient Position: Sitting, Cuff Size: Adult Large)   Pulse 102   Temp 98.1  F (36.7  C) (Tympanic)   Resp 22   Ht 1.676 m (5' 6\")   Wt 129.7 kg (286 lb)   SpO2 96%   BMI 46.16 kg/m          Physical Exam    GENERAL APPEARANCE: healthy, alert and no distress     EYES: EOMI, PERRL     NECK: no adenopathy, no asymmetry, masses, or scars and thyroid normal to palpation     RESP: lungs clear to auscultation - no rales, rhonchi or wheezes     CV: regular rates and rhythm, normal S1 S2, no S3 or S4 and no murmur, click or rub     ABDOMEN:  soft, nontender, no HSM or masses and bowel sounds normal     MS: Right knee pain, medial and lateral - stiffness, altered gait     NEURO: Normal strength and tone, sensory exam grossly normal, mentation intact and speech normal     PSYCH: mentation appears normal. and affect normal/bright      Recent Labs   Lab Test 04/06/21  1013 02/03/21  0000 01/11/21  1617   HGB 14.3  --  12.9     --  357     --  139   POTASSIUM 3.7 4.0 3.7   CR 0.58 0.67 0.61        Diagnostics:  Recent Results (from the past 24 hour(s))   Comprehensive metabolic panel (BMP + Alb, Alk Phos, ALT, AST, Total. Bili, TP)    Collection Time: 07/14/21  3:11 PM   Result Value Ref Range    Sodium 136 133 - 144 mmol/L    Potassium 3.9 3.4 - 5.3 mmol/L    Chloride 105 94 - 109 mmol/L    Carbon Dioxide (CO2) 23 20 - 32 mmol/L    Anion Gap 8 3 - 14 mmol/L    Urea Nitrogen 28 7 - 30 mg/dL    Creatinine 0.81 0.52 - 1.04 mg/dL    Calcium 9.2 8.5 - 10.1 mg/dL    Glucose 103 (H) 70 - 99 mg/dL    Alkaline Phosphatase 65 40 - 150 U/L    AST 13 0 - 45 U/L    ALT 35 0 - 50 U/L    Protein Total 8.2 6.8 - 8.8 g/dL    Albumin 4.2 3.4 - 5.0 g/dL    " Bilirubin Total 0.6 0.2 - 1.3 mg/dL    GFR Estimate 78 >60 mL/min/1.73m2   CBC with platelets and differential    Collection Time: 07/14/21  3:11 PM   Result Value Ref Range    WBC Count 8.7 4.0 - 11.0 10e3/uL    RBC Count 4.10 3.80 - 5.20 10e6/uL    Hemoglobin 12.8 11.7 - 15.7 g/dL    Hematocrit 38.5 35.0 - 47.0 %    MCV 94 78 - 100 fL    MCH 31.2 26.5 - 33.0 pg    MCHC 33.2 31.5 - 36.5 g/dL    RDW 13.5 10.0 - 15.0 %    Platelet Count 327 150 - 450 10e3/uL    % Neutrophils 64 %    % Lymphocytes 25 %    % Monocytes 11 %    % Eosinophils 1 %    % Basophils 0 %    Absolute Neutrophils 5.5 1.6 - 8.3 10e3/uL    Absolute Lymphocytes 2.1 0.8 - 5.3 10e3/uL    Absolute Monocytes 0.9 0.0 - 1.3 10e3/uL    Absolute Eosinophils 0.1 0.0 - 0.7 10e3/uL    Absolute Basophils 0.0 0.0 - 0.2 10e3/uL   Extra Serum Separator Tube (SST)    Collection Time: 07/14/21  3:15 PM   Result Value Ref Range    Hold Specimen JIC    *UA reflex to Microscopic and Culture - [facility]    Collection Time: 07/14/21  3:37 PM    Specimen: Urine, Midstream   Result Value Ref Range    Color Urine Yellow Colorless, Straw, Light Yellow, Yellow    Appearance Urine Clear Clear    Glucose Urine Negative Negative mg/dL    Bilirubin Urine Small (A) Negative    Ketones Urine Negative Negative mg/dL    Specific Gravity Urine >=1.030 1.003 - 1.035    Blood Urine Negative Negative    pH Urine 5.0 5.0 - 7.0    Protein Albumin Urine Negative Negative mg/dL    Urobilinogen Urine 0.2 0.2, 1.0 E.U./dL    Nitrite Urine Negative Negative    Leukocyte Esterase Urine Negative Negative          EKG: appears normal, NSR        Revised Cardiac Risk Index (RCRI):  The patient has the following serious cardiovascular risks for perioperative complications:   - No serious cardiac risks = 0 points     RCRI Interpretation: 0 points: Class I (very low risk - 0.4% complication rate)         Assessment & Plan     The proposed surgical procedure is considered LOW risk.    Preop  general physical exam  - EKG 12-lead complete w/read - (Clinic Performed)  - CBC with platelets and differential  - UA reflex to Microscopic and Culture - [place]  - Comprehensive metabolic panel (BMP + Alb, Alk Phos, ALT, AST, Total. Bili, TP)    Primary osteoarthritis of right knee  - See above        RECOMMENDATION:  APPROVAL GIVEN to proceed with proposed procedure, without further diagnostic evaluation.    [id number]}    Signed Electronically by: Jackie Hitchcock CNP  Copy of this evaluation report is provided to requesting physician.

## 2021-07-14 ENCOUNTER — OFFICE VISIT (OUTPATIENT)
Dept: FAMILY MEDICINE | Facility: OTHER | Age: 61
End: 2021-07-14
Attending: NURSE PRACTITIONER
Payer: COMMERCIAL

## 2021-07-14 VITALS
HEART RATE: 102 BPM | OXYGEN SATURATION: 96 % | BODY MASS INDEX: 45.96 KG/M2 | TEMPERATURE: 98.1 F | RESPIRATION RATE: 22 BRPM | DIASTOLIC BLOOD PRESSURE: 64 MMHG | WEIGHT: 286 LBS | HEIGHT: 66 IN | SYSTOLIC BLOOD PRESSURE: 126 MMHG

## 2021-07-14 DIAGNOSIS — Z01.818 PREOP GENERAL PHYSICAL EXAM: Primary | ICD-10-CM

## 2021-07-14 DIAGNOSIS — M17.11 PRIMARY OSTEOARTHRITIS OF RIGHT KNEE: ICD-10-CM

## 2021-07-14 LAB
ALBUMIN SERPL-MCNC: 4.2 G/DL (ref 3.4–5)
ALBUMIN UR-MCNC: NEGATIVE MG/DL
ALP SERPL-CCNC: 65 U/L (ref 40–150)
ALT SERPL W P-5'-P-CCNC: 35 U/L (ref 0–50)
ANION GAP SERPL CALCULATED.3IONS-SCNC: 8 MMOL/L (ref 3–14)
APPEARANCE UR: CLEAR
AST SERPL W P-5'-P-CCNC: 13 U/L (ref 0–45)
BASOPHILS # BLD AUTO: 0 10E3/UL (ref 0–0.2)
BASOPHILS NFR BLD AUTO: 0 %
BILIRUB SERPL-MCNC: 0.6 MG/DL (ref 0.2–1.3)
BILIRUB UR QL STRIP: ABNORMAL
BUN SERPL-MCNC: 28 MG/DL (ref 7–30)
CALCIUM SERPL-MCNC: 9.2 MG/DL (ref 8.5–10.1)
CHLORIDE BLD-SCNC: 105 MMOL/L (ref 94–109)
CO2 SERPL-SCNC: 23 MMOL/L (ref 20–32)
COLOR UR AUTO: YELLOW
CREAT SERPL-MCNC: 0.81 MG/DL (ref 0.52–1.04)
EOSINOPHIL # BLD AUTO: 0.1 10E3/UL (ref 0–0.7)
EOSINOPHIL NFR BLD AUTO: 1 %
ERYTHROCYTE [DISTWIDTH] IN BLOOD BY AUTOMATED COUNT: 13.5 % (ref 10–15)
GFR SERPL CREATININE-BSD FRML MDRD: 78 ML/MIN/1.73M2
GLUCOSE BLD-MCNC: 103 MG/DL (ref 70–99)
GLUCOSE UR STRIP-MCNC: NEGATIVE MG/DL
HCT VFR BLD AUTO: 38.5 % (ref 35–47)
HGB BLD-MCNC: 12.8 G/DL (ref 11.7–15.7)
HGB UR QL STRIP: NEGATIVE
HOLD SPECIMEN: NORMAL
KETONES UR STRIP-MCNC: NEGATIVE MG/DL
LEUKOCYTE ESTERASE UR QL STRIP: NEGATIVE
LYMPHOCYTES # BLD AUTO: 2.1 10E3/UL (ref 0.8–5.3)
LYMPHOCYTES NFR BLD AUTO: 25 %
MCH RBC QN AUTO: 31.2 PG (ref 26.5–33)
MCHC RBC AUTO-ENTMCNC: 33.2 G/DL (ref 31.5–36.5)
MCV RBC AUTO: 94 FL (ref 78–100)
MONOCYTES # BLD AUTO: 0.9 10E3/UL (ref 0–1.3)
MONOCYTES NFR BLD AUTO: 11 %
NEUTROPHILS # BLD AUTO: 5.5 10E3/UL (ref 1.6–8.3)
NEUTROPHILS NFR BLD AUTO: 64 %
NITRATE UR QL: NEGATIVE
PH UR STRIP: 5 [PH] (ref 5–7)
PLATELET # BLD AUTO: 327 10E3/UL (ref 150–450)
POTASSIUM BLD-SCNC: 3.9 MMOL/L (ref 3.4–5.3)
PROT SERPL-MCNC: 8.2 G/DL (ref 6.8–8.8)
RBC # BLD AUTO: 4.1 10E6/UL (ref 3.8–5.2)
SODIUM SERPL-SCNC: 136 MMOL/L (ref 133–144)
SP GR UR STRIP: >=1.03 (ref 1–1.03)
UROBILINOGEN UR STRIP-ACNC: 0.2 E.U./DL
WBC # BLD AUTO: 8.7 10E3/UL (ref 4–11)

## 2021-07-14 PROCEDURE — 99214 OFFICE O/P EST MOD 30 MIN: CPT | Performed by: NURSE PRACTITIONER

## 2021-07-14 PROCEDURE — 81003 URINALYSIS AUTO W/O SCOPE: CPT | Performed by: NURSE PRACTITIONER

## 2021-07-14 PROCEDURE — 93000 ELECTROCARDIOGRAM COMPLETE: CPT | Mod: 77 | Performed by: INTERNAL MEDICINE

## 2021-07-14 PROCEDURE — 80053 COMPREHEN METABOLIC PANEL: CPT | Performed by: NURSE PRACTITIONER

## 2021-07-14 PROCEDURE — 36415 COLL VENOUS BLD VENIPUNCTURE: CPT | Performed by: NURSE PRACTITIONER

## 2021-07-14 PROCEDURE — 85025 COMPLETE CBC W/AUTO DIFF WBC: CPT | Performed by: NURSE PRACTITIONER

## 2021-07-14 ASSESSMENT — MIFFLIN-ST. JEOR: SCORE: 1874.04

## 2021-07-14 ASSESSMENT — PAIN SCALES - GENERAL: PAINLEVEL: SEVERE PAIN (6)

## 2021-07-14 NOTE — LETTER
[place]  [facility]  [address]  [facility] [address]  [phone]  [phone]      7/14/2021    Re: Vandana FERREIRA Mil      TO WHOM IT MAY CONCERN:    Vandana Jaeger  was seen on 7/14/21.  Please excuse her  until 7/13/21 due to injury.    Cordially        Jackie Hitchcock, CNP

## 2021-07-14 NOTE — PATIENT INSTRUCTIONS
Assessment & Plan     The proposed surgical procedure is considered LOW risk.    Preop general physical exam  - EKG 12-lead complete w/read - (Clinic Performed)  - CBC with platelets and differential  - UA reflex to Microscopic and Culture - [place] [facility]  - Comprehensive metabolic panel (BMP + Alb, Alk Phos, ALT, AST, Total. Bili, TP)    Primary osteoarthritis of right knee  - See above        RECOMMENDATION:  APPROVAL GIVEN to proceed with proposed procedure, without further diagnostic evaluation.        Signed Electronically by: Jackie Hitchcock CNP  Copy of this evaluation report is provided to requesting physician.

## 2021-07-15 ENCOUNTER — TELEPHONE (OUTPATIENT)
Dept: FAMILY MEDICINE | Facility: OTHER | Age: 61
End: 2021-07-15

## 2021-07-15 NOTE — TELEPHONE ENCOUNTER
Pt letter updated is going to call with fax number for employer for letter to be sent.      Sosa Springer LPN

## 2021-07-15 NOTE — LETTER
[place]  [facility]  [address]  [facility] [address]  [phone]  [phone]      7/14/2021    Re: Vandana JHON Jaeger      TO WHOM IT MAY CONCERN:    Vandana Jaeger  was seen on 7/14/21.  Please excuse her  until 7/22/21 due to injury.    Cordially        Jackie Hitchcock, CNP

## 2021-07-16 NOTE — TELEPHONE ENCOUNTER
Call back from patient stating she thinks she gave the wrong fax # on 7/15/21.    Please fax to: [phone]    Patient notified the form will be faxed again to the new number.

## 2021-07-22 ENCOUNTER — TELEPHONE (OUTPATIENT)
Dept: FAMILY MEDICINE | Facility: OTHER | Age: 61
End: 2021-07-22

## 2021-07-22 DIAGNOSIS — F33.0 MILD EPISODE OF RECURRENT MAJOR DEPRESSIVE DISORDER (H): Primary | ICD-10-CM

## 2021-07-22 RX ORDER — ESCITALOPRAM OXALATE 10 MG/1
10 TABLET ORAL DAILY
Qty: 90 TABLET | Refills: 1 | Status: SHIPPED | OUTPATIENT
Start: 2021-07-22 | End: 2021-11-23

## 2021-07-22 RX ORDER — ESCITALOPRAM OXALATE 20 MG/1
20 TABLET ORAL DAILY
Qty: 90 TABLET | Refills: 1 | Status: SHIPPED | OUTPATIENT
Start: 2021-07-22 | End: 2021-11-23

## 2021-07-22 NOTE — TELEPHONE ENCOUNTER
Pharmacy reports patient's insurance won't cover 1.5 of the lexapro. It needs to be written as 2 scripts. One for 10 mg and one for 20 mg.

## 2021-08-25 DIAGNOSIS — N39.3 URINARY, INCONTINENCE, STRESS FEMALE: ICD-10-CM

## 2021-08-25 RX ORDER — SOLIFENACIN SUCCINATE 5 MG/1
TABLET, FILM COATED ORAL
Qty: 90 TABLET | Refills: 1 | Status: SHIPPED | OUTPATIENT
Start: 2021-08-25 | End: 2022-02-18

## 2021-09-02 DIAGNOSIS — I10 BENIGN ESSENTIAL HYPERTENSION: ICD-10-CM

## 2021-09-02 RX ORDER — LISINOPRIL 40 MG/1
TABLET ORAL
Qty: 90 TABLET | Refills: 2 | Status: SHIPPED | OUTPATIENT
Start: 2021-09-02 | End: 2022-05-13

## 2021-09-28 ENCOUNTER — TRANSFERRED RECORDS (OUTPATIENT)
Dept: HEALTH INFORMATION MANAGEMENT | Facility: CLINIC | Age: 61
End: 2021-09-28

## 2021-10-05 ENCOUNTER — LAB (OUTPATIENT)
Dept: LAB | Facility: OTHER | Age: 61
End: 2021-10-05
Attending: NURSE PRACTITIONER
Payer: COMMERCIAL

## 2021-10-05 DIAGNOSIS — D05.01 LOBULAR CARCINOMA IN SITU OF RIGHT BREAST: ICD-10-CM

## 2021-10-05 DIAGNOSIS — D05.11 DUCTAL CARCINOMA IN SITU OF RIGHT BREAST: ICD-10-CM

## 2021-10-05 LAB
ALBUMIN SERPL-MCNC: 4.1 G/DL (ref 3.4–5)
ALP SERPL-CCNC: 67 U/L (ref 40–150)
ALT SERPL W P-5'-P-CCNC: 32 U/L (ref 0–50)
ANION GAP SERPL CALCULATED.3IONS-SCNC: 6 MMOL/L (ref 3–14)
AST SERPL W P-5'-P-CCNC: 18 U/L (ref 0–45)
BASOPHILS # BLD AUTO: 0 10E3/UL (ref 0–0.2)
BASOPHILS NFR BLD AUTO: 0 %
BILIRUB SERPL-MCNC: 0.4 MG/DL (ref 0.2–1.3)
BUN SERPL-MCNC: 20 MG/DL (ref 7–30)
CALCIUM SERPL-MCNC: 9.4 MG/DL (ref 8.5–10.1)
CHLORIDE BLD-SCNC: 102 MMOL/L (ref 94–109)
CO2 SERPL-SCNC: 27 MMOL/L (ref 20–32)
CREAT SERPL-MCNC: 0.7 MG/DL (ref 0.52–1.04)
EOSINOPHIL # BLD AUTO: 0.1 10E3/UL (ref 0–0.7)
EOSINOPHIL NFR BLD AUTO: 2 %
ERYTHROCYTE [DISTWIDTH] IN BLOOD BY AUTOMATED COUNT: 12.4 % (ref 10–15)
GFR SERPL CREATININE-BSD FRML MDRD: >90 ML/MIN/1.73M2
GLUCOSE BLD-MCNC: 100 MG/DL (ref 70–99)
HCT VFR BLD AUTO: 39.3 % (ref 35–47)
HGB BLD-MCNC: 12.9 G/DL (ref 11.7–15.7)
LYMPHOCYTES # BLD AUTO: 2.1 10E3/UL (ref 0.8–5.3)
LYMPHOCYTES NFR BLD AUTO: 33 %
MCH RBC QN AUTO: 30.5 PG (ref 26.5–33)
MCHC RBC AUTO-ENTMCNC: 32.8 G/DL (ref 31.5–36.5)
MCV RBC AUTO: 93 FL (ref 78–100)
MONOCYTES # BLD AUTO: 0.5 10E3/UL (ref 0–1.3)
MONOCYTES NFR BLD AUTO: 8 %
NEUTROPHILS # BLD AUTO: 3.7 10E3/UL (ref 1.6–8.3)
NEUTROPHILS NFR BLD AUTO: 58 %
PLATELET # BLD AUTO: 308 10E3/UL (ref 150–450)
POTASSIUM BLD-SCNC: 4.4 MMOL/L (ref 3.4–5.3)
PROT SERPL-MCNC: 7.7 G/DL (ref 6.8–8.8)
RBC # BLD AUTO: 4.23 10E6/UL (ref 3.8–5.2)
SODIUM SERPL-SCNC: 135 MMOL/L (ref 133–144)
WBC # BLD AUTO: 6.5 10E3/UL (ref 4–11)

## 2021-10-05 PROCEDURE — 36415 COLL VENOUS BLD VENIPUNCTURE: CPT

## 2021-10-05 PROCEDURE — 80053 COMPREHEN METABOLIC PANEL: CPT

## 2021-10-05 PROCEDURE — 85025 COMPLETE CBC W/AUTO DIFF WBC: CPT

## 2021-10-06 ENCOUNTER — ONCOLOGY VISIT (OUTPATIENT)
Dept: ONCOLOGY | Facility: OTHER | Age: 61
End: 2021-10-06
Attending: NURSE PRACTITIONER
Payer: COMMERCIAL

## 2021-10-06 ENCOUNTER — DOCUMENTATION ONLY (OUTPATIENT)
Dept: ONCOLOGY | Facility: CLINIC | Age: 61
End: 2021-10-06

## 2021-10-06 VITALS
TEMPERATURE: 97.9 F | HEART RATE: 95 BPM | BODY MASS INDEX: 47.09 KG/M2 | SYSTOLIC BLOOD PRESSURE: 148 MMHG | OXYGEN SATURATION: 99 % | RESPIRATION RATE: 20 BRPM | WEIGHT: 293 LBS | DIASTOLIC BLOOD PRESSURE: 80 MMHG | HEIGHT: 66 IN

## 2021-10-06 DIAGNOSIS — Z86.000 HISTORY OF DUCTAL CARCINOMA IN SITU (DCIS) OF BREAST: ICD-10-CM

## 2021-10-06 DIAGNOSIS — D05.01 LOBULAR CARCINOMA IN SITU OF RIGHT BREAST: ICD-10-CM

## 2021-10-06 DIAGNOSIS — Z91.89 AT HIGH RISK FOR BREAST CANCER: ICD-10-CM

## 2021-10-06 DIAGNOSIS — Z12.31 VISIT FOR SCREENING MAMMOGRAM: Primary | ICD-10-CM

## 2021-10-06 PROCEDURE — 99215 OFFICE O/P EST HI 40 MIN: CPT | Performed by: NURSE PRACTITIONER

## 2021-10-06 ASSESSMENT — PAIN SCALES - GENERAL: PAINLEVEL: MODERATE PAIN (5)

## 2021-10-06 ASSESSMENT — MIFFLIN-ST. JEOR: SCORE: 1910.75

## 2021-10-06 NOTE — PROGRESS NOTES
High Risk Breast Cancer Visit:  2021    Reason for Visit:  Patient presents with:  Oncology Clinic Visit: Follow up Ductal carcinoma in situ (DCIS) of right breast     Nursing Note and documentation reviewed: yes    HPI:  This is a 61-year-old female patient who presents to the Oncology clinic today in follow-up of high risk breast cancer.    Patient was diagnosed with DCIS and LCIS of the right breast in 2014 and underwent lumpectomy with both completely excised.  She was placed on tamoxifen 20 mg daily in 2014 and completed 5 years of therapy discontinuing this in 2019.  She is undergoing high risk rescreening.    Interim HX: She states she is doing well.  She has no complaints with the breast tissue including no new lumps, skin changes or dimpling.  She states she does perform self breast exams.  MRI of the breast was completed in February and she was due for a mammogram in August.  She underwent knee surgery about 7 weeks ago.  She also underwent recent stent placement.    Family History: Father prostate cancer-unsure of age (thinks 50's) at diagnoses and  at 81 of GB; mom breast cancer in 50's and  at 71 of lung issue    Risk Assessment:      HRT:  none  Previous Breast Biopsies: 2013 pathology showed hyperplasia without atypia and reread in 2014 as DCIS with LCIS  Exercise:  None-had TKA 7 weeks ago  Diet:  fair  Alcohol:  Yes-not daily-may have 2 in evening 4-5 days a week  Smoking: none  Age of 1st menses:  13  Age of first pregnancy:  16 miscarraged and 27 for second pregnancy  H/o Breastfeeding:  3 months  LMP:  In 's    Genetic Counseling:    Patient declined    Genetic Testing:  None    Past Medical History:   Diagnosis Date     Anxiety state, unspecified      ASCVD (arteriosclerotic cardiovascular disease) 2021     Benign essential hypertension 2015     Contact dermatitis and other eczema, due to unspecified cause      Ductal carcinoma in  situ of right breast 2014     Ductal carcinoma in situ of right breast 2014     H/O herpes zoster      History of COVID-19 2021     Hx of heart artery stent 2021     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 Provider to review     Recurrent major depressive disorder (H) 2019     Unspecified immunity deficiency      Urinary, incontinence, stress female 2021     Vitamin D deficiency 2016       Past Surgical History:   Procedure Laterality Date     BIOPSY      right breast--neg     BIOPSY BREAST NEEDLE LOCALIZATION  2014    Procedure: BIOPSY BREAST NEEDLE LOCALIZATION;  WIRE LOCALIZED SEGMENTAL RESECTION, RIGHT BREAST;  Surgeon: Shanika Beaver MD;  Location: [facility]     CHOLECYSTECTOMY       COLONOSCOPY       COLONOSCOPY  2013     DILATION AND CURETTAGE, OPERATIVE HYSTEROSCOPY, COMBINED  2017    [facility]     ORTHOPEDIC SURGERY      heel surgery     ORTHOPEDIC SURGERY      rt knee meniiscus tear       Family History   Problem Relation Age of Onset     C.A.D. Father      Coronary Artery Disease Father      Cerebrovascular Disease Father      Prostate Cancer Father      Hypertension Mother      Cerebrovascular Disease Mother      Breast Cancer Mother 55         age 71 left mastectomy     Thyroid Disease Other      Thyroid Disease Sister      Diabetes No family hx of      Asthma No family hx of      Hyperlipidemia No family hx of      Colon Cancer No family hx of      Other Cancer No family hx of      Anesthesia Reaction No family hx of      Genetic Disorder No family hx of        Social History     Socioeconomic History     Marital status: Single     Spouse name: Not on file      Number of children: Not on file     Years of education: Not on file     Highest education level: Not on file   Occupational History     Not on file   Tobacco Use     Smoking status: Former Smoker     Packs/day: 0.10     Years: 30.00     Pack years: 3.00     Types: Cigarettes     Start date: 8/15/1974     Quit date: 3/3/2013     Years since quittin.6     Smokeless tobacco: Never Used   Substance and Sexual Activity     Alcohol use: Yes     Drug use: No     Sexual activity: Yes     Partners: Male   Other Topics Concern     Parent/sibling w/ CABG, MI or angioplasty before 65F 55M? No   Social History Narrative     Not on file     Social Determinants of Health     Financial Resource Strain:      Difficulty of Paying Living Expenses:    Food Insecurity:      Worried About Running Out of Food in the Last Year:      Ran Out of Food in the Last Year:    Transportation Needs:      Lack of Transportation (Medical):      Lack of Transportation (Non-Medical):    Physical Activity:      Days of Exercise per Week:      Minutes of Exercise per Session:    Stress:      Feeling of Stress :    Social Connections:      Frequency of Communication with Friends and Family:      Frequency of Social Gatherings with Friends and Family:      Attends Yazidi Services:      Active Member of Clubs or Organizations:      Attends Club or Organization Meetings:      Marital Status:    Intimate Partner Violence:      Fear of Current or Ex-Partner:      Emotionally Abused:      Physically Abused:      Sexually Abused:        Current Outpatient Medications   Medication     acetaminophen (TYLENOL 8 HOUR ARTHRITIS PAIN) 650 MG CR tablet     albuterol (PROAIR HFA) 108 (90 Base) MCG/ACT inhaler     amLODIPine (NORVASC) 5 MG tablet     ASPIRIN PO     atorvastatin (LIPITOR) 40 MG tablet     calcium-vitamin D (CALTRATE) 600-400 MG-UNIT per tablet     clopidogrel (PLAVIX) 75 MG tablet     cyanocobalamin ([employer] VITAMIN  B12) 1000 MCG TABS     doxylamine  "(UNISOM) 25 MG TABS tablet     escitalopram (LEXAPRO) 10 MG tablet     escitalopram (LEXAPRO) 20 MG tablet     lisinopril (ZESTRIL) 40 MG tablet     Multiple Vitamin (DAILY MULTIVITAMIN PO)     nystatin (MYCOSTATIN) 321585 UNIT/GM POWD     Omega-3 Fatty Acids (OMEGA 3 PO)     solifenacin (VESICARE) 5 MG tablet     UNABLE TO FIND     VITAMIN D, CHOLECALCIFEROL, PO     albuterol (2.5 MG/3ML) 0.083% neb solution     nitroGLYcerin (NITROSTAT) 0.4 MG sublingual tablet     No current facility-administered medications for this visit.        Allergies   Allergen Reactions     Adhesive Tape      Steri strips left scar.     Latex      Possible allergy.       Review Of Systems:  Constitutional:    denies fever, chills, and night sweats.  Has gained some weight.  Eyes:    denies blurred or double vision  Ears/Nose/Throat:   denies ear pain, difficulty swallowing; nasal drainage  Respiratory:   denies shortness of breath, cough   Skin:   denies rash, lesions  Breast/Chest wall:   denies pain, lumps or discharge  Cardiovascular:   denies chest pain, palpitations, edema  Gastrointestinal:   denies abdominal pain, bloating, nausea, early satiety; no change in bowel habits or blood in stool  Genitourinary:   denies difficulty with urination, blood in urine  Musculoskeletal:    denies new muscle pain, bone pain  Neurologic:   denies lightheadedness, headaches, numbness or tingling  Hematologic/Lymphatic/Immunologic:   denies easy bruising, easy bleeding, lumps or bumps noted  Endocrine:   Some increased thirst at times      Physical Exam:  BP (!) 148/80   Pulse 95   Temp 97.9  F (36.6  C) (Tympanic)   Resp 20   Ht 1.676 m (5' 6\")   Wt 133.4 kg (294 lb 1.5 oz)   SpO2 99%   BMI 47.47 kg/m      GENERAL APPEARANCE: Healthy, alert and in no acute distress.  HEENT: Normocephalic, Sclerae anicteric. Oropharynx without ulcers, lesions, or thrush.  NECK:   No asymmetry or masses, no thyromegaly.  LYMPHATICS: No palpable cervical, " supraclavicular, axillary nodes   RESP: Lungs clear to auscultation bilaterally, respirations regular and easy  CARDIOVASCULAR: Regular rate and rhythm. Normal S1, S2; no murmur, gallop, or rub.  ABDOMEN: Soft, nontender. Bowel sounds auscultated all 4 quadrants. No palpable organomegaly or masses.  BREAST/Chest wall: no concerning lumps, masses or tenderness bilaterally; no skin changes or dimpling; examined in the supine and sitting position  MUSCULOSKELETAL: Extremities without gross deformities noted. No edema of bilateral lower extremities.  NEURO: Alert and oriented x 3.  Gait steady.  PSYCHIATRIC: Mentation and affect appear normal.  Mood appropriate.    Laboratory:  Component      Latest Ref Rng & Units 10/5/2021   WBC      4.0 - 11.0 10e3/uL 6.5   RBC Count      3.80 - 5.20 10e6/uL 4.23   Hemoglobin      11.7 - 15.7 g/dL 12.9   Hematocrit      35.0 - 47.0 % 39.3   MCV      78 - 100 fL 93   MCH      26.5 - 33.0 pg 30.5   MCHC      31.5 - 36.5 g/dL 32.8   RDW      10.0 - 15.0 % 12.4   Platelet Count      150 - 450 10e3/uL 308   % Neutrophils      % 58   % Lymphocytes      % 33   % Monocytes      % 8   % Eosinophils      % 2   % Basophils      % 0   Absolute Neutrophils      1.6 - 8.3 10e3/uL 3.7   Absolute Lymphocytes      0.8 - 5.3 10e3/uL 2.1   Absolute Monocytes      0.0 - 1.3 10e3/uL 0.5   Absolute Eosinophils      0.0 - 0.7 10e3/uL 0.1   Absolute Basophils      0.0 - 0.2 10e3/uL 0.0   Sodium      133 - 144 mmol/L 135   Potassium      3.4 - 5.3 mmol/L 4.4   Chloride      94 - 109 mmol/L 102   Carbon Dioxide      20 - 32 mmol/L 27   Anion Gap      3 - 14 mmol/L 6   Urea Nitrogen      7 - 30 mg/dL 20   Creatinine      0.52 - 1.04 mg/dL 0.70   Calcium      8.5 - 10.1 mg/dL 9.4   Glucose      70 - 99 mg/dL 100 (H)   Alkaline Phosphatase      40 - 150 U/L 67   AST      0 - 45 U/L 18   ALT      0 - 50 U/L 32   Protein Total      6.8 - 8.8 g/dL 7.7   Albumin      3.4 - 5.0 g/dL 4.1   Bilirubin Total      0.2 -  1.3 mg/dL 0.4   GFR Estimate      >60 mL/min/1.73m2 >90     Breast Imaging Studies:      Last completed February 2021:   PROCEDURE: MR BREAST BILATERAL W/O & W CONTRAST     CLINICAL INDICATION:  LCIS/high risk breast screening/history of DCIS;  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; History of ductal carcinoma  in situ (DCIS) of breast; At high risk for breast cancer     MENSTRUAL STATUS:  The patient is post-menopausal.      COMPARISON: Mammography 1/20/2020 through 11/21/2013. MR bilateral  breasts 1/31/2014.     CONTRAST DOSE:  Gadavist   10 mL     TECHNIQUE:  Bilateral breast MRI was performed using a dedicated  breast coil.  The images were obtained prior to and following the  administration of intravenous gadolinium.  Precontrast imaging  includes T1 axial and STIR images. Serial dynamic pre- and  postcontrast enhanced 3D VIEWS sequences with fat suppression were  obtained in the axial projection. Post-processing includes axial  subtraction, sagittal MPR and 3-D MIP images.  Contrast enhancement  kinetics were evaluated using Lexy breast MRI CAD software.      FINDINGS:  There is scattered fibroglandular tissue. There is mild  background parenchymal enhancement within both breasts.      RIGHT BREAST:  Postoperative architectural distortion is present in  the outer right breast. Coarse calcifications were associated with the  central portion of the scar on the prior mammogram. No dominant mass  is seen. There is no suspicious non-mass enhancement. There is no  chest wall abnormality.     LEFT BREAST: No dominant mass is seen. There is no architectural  distortion or suspicious non-mass enhancement. There is no chest wall  abnormality.     EXTRAMAMMARY FINDINGS: No suspicious adenopathy or other extramammary  lesion is identified.                                                                         IMPRESSION:     NO MR EVIDENCE OF MALIGNANCY WITHIN EITHER BREAST.     RECOMMENDATION: Continued breast  screening examinations.     BIRADS CODE: 2 - BENIGN           RICARDO UGADALUPE MD       ASSESSMENT/PLAN:    #1 History of DCIS: History of ductal carcinoma in situ of the right breast and lobular carcinoma in situ of the right breast diagnosed January 2014.  She underwent lumpectomy and was placed on tamoxifen 20 mg daily and completed 5 years of therapy.      #2 history of LCIS: Lobular carcinoma in situ completely excised January 2014.  She completed 5 years of tamoxifen therapy as above.  Plan was to go forth with high risk screening.  MRI completed in February was negative.  She is overdue for her mammogram so we will get that scheduled.  I will see her back in 6 months for clinical breast exam.  We will also send a referral to the Cancer Risk Management group for genetic counseling and I strongly encouraged her to follow through with this related to her personal and family history.    We discussed the following healthy lifestyle plan recommended by both NCCN and the American Cancer Society that can reduce the risk of breast cancer.      1.  Limit alcohol consumption to less than 1 drink per day (1 drink = 5 oz of wine, 12 oz of beer or 1.5 oz of 80 proof liquor).  2.  Exercise per American Cancer Society guidelines of at least 150 minutes of moderate intensity activity or 75 minutes of vigorous activity each week.  (Or a combination of both.)  Exercise should be spread out over the week.  Regular recreational exercise has been shown to reduce the risk of cancer by 30%.  3.  Maintain a healthy weight with a body mass index between 19-24.9.  A postmenopausal BMI of 30.7 has been shown to have increased the risk for breast cancer by 37% in some studies.    4.  Do not use tobacco products and limit exposure to passive smoke.  5.  Breast-feed if possible.  Research shows that 16+ months of breast-feeding, over a lifetime, can reduce a woman's risk of breast cancer by up to 27%.  (Data also from: Betty ONTIVEROS,  Melba  P.  Estrogen and the risk of breast cancer.  [place] Journal of Medicine 2001; 344: 276.).    I encouraged patient to call with any questions or concerns.     Approximately 45 minutes spent in the patient's encounter today with time spent reviewing her chart along with chart preparation and reviewing NCCN latest guidelines for high risk breast cancer management.  Time was also spent with the patient taking a review of systems and performing a physical exam along with review of recent lab work.  Time was spent in discussion of surveillance along with recommendations to follow through with a genetic counseling visit related to her personal and family history.  Time was also spent in discussion of prevention.  Time was also spent in planning her next follow-up and ordering her mammogram. Time was also spent in documentation.    Matilde Patrick, NP  APRN, FNP-BC, AOCNP

## 2021-10-06 NOTE — PATIENT INSTRUCTIONS
We will schedule you for a mammogram.    I will send a referral to the cancer risk management group at the [facility] for genetic counseling visit.    We would like to see you back in 6 months.     If you have any questions please call [phone]    Other instructions:      We discussed the following healthy lifestyle plan recommended by both NCCN and the American Cancer Society that can reduce the risk of breast cancer.      1.  Limit alcohol consumption to less than 1 drink per day (1 drink = 5 oz of wine, 12 oz of beer or 1.5 oz of 80 proof liquor).  2.  Exercise per American Cancer Society guidelines of at least 150 minutes of moderate intensity activity or 75 minutes of vigorous activity each week.  (Or a combination of both.)  Exercise should be spread out over the week.  Regular recreational exercise has been shown to reduce the risk of cancer by 30%.  3.  Maintain a healthy weight with a body mass index between 19-24.9.  A postmenopausal BMI of 30.7 has been shown to have increased the risk for breast cancer by 37% in some studies.    4.  Do not use tobacco products and limit exposure to passive smoke.  5.  Breast-feed if possible.  Research shows that 16+ months of breast-feeding, over a lifetime, can reduce a woman's risk of breast cancer by up to 27%.  (Data also from: Betty ROWE.,  Melba FRENCH.  Estrogen and the risk of breast cancer.  [place] Journal of Medicine 2001; 344: 276.).Good blood

## 2021-10-13 ENCOUNTER — OFFICE VISIT (OUTPATIENT)
Dept: FAMILY MEDICINE | Facility: OTHER | Age: 61
End: 2021-10-13
Attending: NURSE PRACTITIONER

## 2021-10-13 VITALS
DIASTOLIC BLOOD PRESSURE: 78 MMHG | SYSTOLIC BLOOD PRESSURE: 138 MMHG | RESPIRATION RATE: 22 BRPM | OXYGEN SATURATION: 97 % | TEMPERATURE: 98.1 F | BODY MASS INDEX: 48.29 KG/M2 | WEIGHT: 293 LBS | HEART RATE: 100 BPM

## 2021-10-13 DIAGNOSIS — Z12.4 SCREENING FOR CERVICAL CANCER: Primary | ICD-10-CM

## 2021-10-13 DIAGNOSIS — B37.2 CANDIDIASIS OF SKIN AND NAILS: ICD-10-CM

## 2021-10-13 DIAGNOSIS — I25.10 ASCVD (ARTERIOSCLEROTIC CARDIOVASCULAR DISEASE): ICD-10-CM

## 2021-10-13 DIAGNOSIS — F41.9 ANXIETY: ICD-10-CM

## 2021-10-13 DIAGNOSIS — E55.9 VITAMIN D DEFICIENCY: ICD-10-CM

## 2021-10-13 DIAGNOSIS — I10 ESSENTIAL HYPERTENSION: ICD-10-CM

## 2021-10-13 DIAGNOSIS — F33.1 MODERATE EPISODE OF RECURRENT MAJOR DEPRESSIVE DISORDER (H): ICD-10-CM

## 2021-10-13 DIAGNOSIS — R53.82 CHRONIC FATIGUE: ICD-10-CM

## 2021-10-13 PROBLEM — Z96.651 STATUS POST TOTAL RIGHT KNEE REPLACEMENT: Status: ACTIVE | Noted: 2021-10-13

## 2021-10-13 PROBLEM — Z78.9 IMPAIRED MOTOR CONTROL: Status: ACTIVE | Noted: 2021-08-16

## 2021-10-13 PROBLEM — R26.89 IMPAIRMENT OF BALANCE: Status: ACTIVE | Noted: 2021-08-16

## 2021-10-13 PROBLEM — M25.561 RIGHT KNEE PAIN: Status: ACTIVE | Noted: 2021-08-16

## 2021-10-13 PROBLEM — R26.89 IMPAIRED GAIT AND MOBILITY: Status: ACTIVE | Noted: 2021-08-16

## 2021-10-13 PROCEDURE — 99215 OFFICE O/P EST HI 40 MIN: CPT | Performed by: NURSE PRACTITIONER

## 2021-10-13 PROCEDURE — G0145 SCR C/V CYTO,THINLAYER,RESCR: HCPCS | Performed by: NURSE PRACTITIONER

## 2021-10-13 RX ORDER — ATORVASTATIN CALCIUM 40 MG/1
40 TABLET, FILM COATED ORAL DAILY
Qty: 90 TABLET | Refills: 1 | Status: SHIPPED | OUTPATIENT
Start: 2021-10-13 | End: 2022-12-16

## 2021-10-13 RX ORDER — AMLODIPINE BESYLATE 5 MG/1
5 TABLET ORAL DAILY
Qty: 90 TABLET | Refills: 1 | Status: SHIPPED | OUTPATIENT
Start: 2021-10-13 | End: 2022-08-19

## 2021-10-13 RX ORDER — CLOPIDOGREL BISULFATE 75 MG/1
75 TABLET ORAL DAILY
Qty: 90 TABLET | Refills: 1 | Status: SHIPPED | OUTPATIENT
Start: 2021-10-13 | End: 2023-12-04

## 2021-10-13 RX ORDER — NYSTATIN 100000 [USP'U]/G
100000 POWDER TOPICAL 2 TIMES DAILY PRN
Qty: 60 G | Refills: 3 | Status: SHIPPED | OUTPATIENT
Start: 2021-10-13

## 2021-10-13 ASSESSMENT — ANXIETY QUESTIONNAIRES
6. BECOMING EASILY ANNOYED OR IRRITABLE: NOT AT ALL
7. FEELING AFRAID AS IF SOMETHING AWFUL MIGHT HAPPEN: NOT AT ALL
GAD7 TOTAL SCORE: 2
4. TROUBLE RELAXING: NOT AT ALL
3. WORRYING TOO MUCH ABOUT DIFFERENT THINGS: SEVERAL DAYS
1. FEELING NERVOUS, ANXIOUS, OR ON EDGE: SEVERAL DAYS
IF YOU CHECKED OFF ANY PROBLEMS ON THIS QUESTIONNAIRE, HOW DIFFICULT HAVE THESE PROBLEMS MADE IT FOR YOU TO DO YOUR WORK, TAKE CARE OF THINGS AT HOME, OR GET ALONG WITH OTHER PEOPLE: NOT DIFFICULT AT ALL
2. NOT BEING ABLE TO STOP OR CONTROL WORRYING: NOT AT ALL
5. BEING SO RESTLESS THAT IT IS HARD TO SIT STILL: NOT AT ALL

## 2021-10-13 ASSESSMENT — PATIENT HEALTH QUESTIONNAIRE - PHQ9: SUM OF ALL RESPONSES TO PHQ QUESTIONS 1-9: 6

## 2021-10-13 ASSESSMENT — PAIN SCALES - GENERAL: PAINLEVEL: MILD PAIN (2)

## 2021-10-13 NOTE — NURSING NOTE
"Chief Complaint   Patient presents with     Chronic Disease Management       Initial /80 (BP Location: Left arm, Patient Position: Sitting, Cuff Size: Adult Large)   Pulse 100   Temp 98.1  F (36.7  C) (Tympanic)   Resp 22   Wt 135.7 kg (299 lb 3.2 oz)   SpO2 97%   BMI 48.29 kg/m   Estimated body mass index is 48.29 kg/m  as calculated from the following:    Height as of 10/6/21: 1.676 m (5' 6\").    Weight as of this encounter: 135.7 kg (299 lb 3.2 oz).  Medication Reconciliation: complete  Harleen Rapp LPN  "

## 2021-10-13 NOTE — PATIENT INSTRUCTIONS
Assessment & Plan        Screening for cervical cancer  - A pap thin layer screen reflex to HPV if ASCUS - recommend age 25 - 29    Essential hypertension  - amLODIPine (NORVASC) 5 MG tablet; Take 1 tablet (5 mg) by mouth daily  - aspirin (ASA) 81 MG EC tablet; Take 1 tablet (81 mg) by mouth daily    ASCVD (arteriosclerotic cardiovascular disease)  - atorvastatin (LIPITOR) 40 MG tablet; Take 1 tablet (40 mg) by mouth daily  - clopidogrel (PLAVIX) 75 MG tablet; Take 1 tablet (75 mg) by mouth daily  - aspirin (ASA) 81 MG EC tablet; Take 1 tablet (81 mg) by mouth daily    Moderate episode of recurrent major depressive disorder (H)  -Continue plan of care    Anxiety  - Continue plan of care    Vitamin D deficiency  - D3 5000 U OTC    Chronic fatigue  - Multiple vitamin daily      Continue Cardia Follow-up  Continue Follow-up with Oncology    Return in about 6 months (around 4/13/2022) for Chronic disease management, am fasting.    Jackie Hitchcock CNP  [facility] [place] [facility]

## 2021-10-13 NOTE — PROGRESS NOTES
Assessment & Plan        Screening for cervical cancer  - A pap thin layer screen reflex to HPV if ASCUS - recommend age 25 - 29    Essential hypertension  - amLODIPine (NORVASC) 5 MG tablet; Take 1 tablet (5 mg) by mouth daily  - aspirin (ASA) 81 MG EC tablet; Take 1 tablet (81 mg) by mouth daily    ASCVD (arteriosclerotic cardiovascular disease)  - atorvastatin (LIPITOR) 40 MG tablet; Take 1 tablet (40 mg) by mouth daily  - clopidogrel (PLAVIX) 75 MG tablet; Take 1 tablet (75 mg) by mouth daily  - aspirin (ASA) 81 MG EC tablet; Take 1 tablet (81 mg) by mouth daily    Moderate episode of recurrent major depressive disorder (H)  -Continue plan of care    Anxiety  - Continue plan of care    Vitamin D deficiency  - D3 5000 U OTC    Chronic fatigue  - Multiple vitamin daily      Continue Cardia Follow-up  Continue Follow-up with Oncology      Handicap parking paperwork given - altered gait, osteoarthritis      Visit time - 40 minutes. Documentation, chart review, outside record review, pap, patient education.       Return in about 6 months (around 4/13/2022) for Chronic disease management, am fasting.      Jackie Hitchcock, MAXX  [facility] - ANDRE Ross is a 61 year old who presents for the following health issues       Hypertension Follow-up    Do you check your blood pressure regularly outside of the clinic? No     Are you following a low salt diet? Yes    Are your blood pressures ever more than 140 on the top number (systolic) OR more   than 90 on the bottom number (diastolic), for example 140/90? Yes      Depression and Anxiety Follow-Up    How are you doing with your depression since your last visit? Worsened since being out of work    How are you doing with your anxiety since your last visit?  Improved     Are you having other symptoms that might be associated with depression or anxiety? No    Have you had a significant life event? No     Do you have any concerns with your use of alcohol or  other drugs? No      Coronary Vascular Disease Follow-up    How often do you take nitroglycerin? Never    Do you take an aspirin every day?  Is on Plavix      Social History     Tobacco Use     Smoking status: Former Smoker     Packs/day: 0.10     Years: 30.00     Pack years: 3.00     Types: Cigarettes     Start date: 8/15/1974     Quit date: 3/3/2013     Years since quittin.6     Smokeless tobacco: Never Used   Substance Use Topics     Alcohol use: Yes     Drug use: No       PHQ 2021 2021 10/13/2021   PHQ-9 Total Score 9 3 6   Q9: Thoughts of better off dead/self-harm past 2 weeks Not at all Not at all Not at all       REUBEN-7 SCORE 2021 2021 10/13/2021   Total Score 13 4 2       Last PHQ-9 10/13/2021   1.  Little interest or pleasure in doing things 2   2.  Feeling down, depressed, or hopeless 1   3.  Trouble falling or staying asleep, or sleeping too much 2   4.  Feeling tired or having little energy 1   5.  Poor appetite or overeating 0   6.  Feeling bad about yourself 0   7.  Trouble concentrating 0   8.  Moving slowly or restless 0   Q9: Thoughts of better off dead/self-harm past 2 weeks 0   PHQ-9 Total Score 6   Difficulty at work, home, or with people Not difficult at all       REUBEN-7  10/13/2021   1. Feeling nervous, anxious, or on edge 1   2. Not being able to stop or control worrying 0   3. Worrying too much about different things 1   4. Trouble relaxing 0   5. Being so restless that it is hard to sit still 0   6. Becoming easily annoyed or irritable 0   7. Feeling afraid, as if something awful might happen 0   REUBEN-7 Total Score 2   If you checked any problems, how difficult have they made it for you to do your work, take care of things at home, or get along with other people? Not difficult at all       Pap due  Last Pap 2016  No vaginal complaints      Fatigue, tired low energy  She is requesting Adderall for energy which I am not comfortable prescribing       Recent Oncology  visit:    High Risk Breast Cancer Visit:  October 6, 2021     Reason for Visit:  Patient presents with:  Oncology Clinic Visit: Follow up Ductal carcinoma in situ (DCIS) of right breast     Nursing Note and documentation reviewed: yes       HPI:  This is a 61-year-old female patient who presents to the Oncology clinic today in follow-up of high risk breast cancer.    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  She was placed on tamoxifen 20 mg daily in March 2014 and completed 5 years of therapy discontinuing this in April 2019.      Review of Systems   Constitutional, HEENT, cardiovascular, pulmonary, gi and gu systems are negative, except as otherwise noted.        Objective    /80 (BP Location: Left arm, Patient Position: Sitting, Cuff Size: Adult Large)   Pulse 100   Temp 98.1  F (36.7  C) (Tympanic)   Resp 22   Wt 135.7 kg (299 lb 3.2 oz)   SpO2 97%   BMI 48.29 kg/m    Body mass index is 48.29 kg/m .       Physical Exam   GENERAL: healthy, alert and no distress  HENT: ear canals and TM's normal, nose and mouth without ulcers or lesions  NECK: no adenopathy, no asymmetry, masses, or scars and thyroid normal to palpation  RESP: lungs clear to auscultation - no rales, rhonchi or wheezes  CV: regular rate and rhythm, normal S1 S2, no S3 or S4, no murmur, click or rub, no peripheral edema and peripheral pulses strong  ABDOMEN: soft, nontender, no hepatosplenomegaly, no masses and bowel sounds normal   - normal findings, cervix well visualized - pap taken  MS: knee stiffness, altered gait uses cane  NEURO: Normal strength and tone, mentation intact and speech normal  PSYCH: mentation appears normal, affect normal/bright        Patient is seen in conjunction with NP student.  History is reviewed with patient and pertinent portions of the exam are repeated.  Assessment and plan is reviewed with the patient.

## 2021-10-14 ASSESSMENT — ANXIETY QUESTIONNAIRES: GAD7 TOTAL SCORE: 2

## 2021-10-18 LAB
BKR LAB AP GYN ADEQUACY: NORMAL
BKR LAB AP GYN INTERPRETATION: NORMAL
BKR LAB AP HPV REFLEX: NORMAL
BKR LAB AP PREVIOUS ABNORMAL: NORMAL
PATH REPORT.COMMENTS IMP SPEC: NORMAL
PATH REPORT.RELEVANT HX SPEC: NORMAL

## 2021-10-20 ENCOUNTER — TELEPHONE (OUTPATIENT)
Dept: FAMILY MEDICINE | Facility: OTHER | Age: 61
End: 2021-10-20

## 2021-10-26 ENCOUNTER — TRANSFERRED RECORDS (OUTPATIENT)
Dept: HEALTH INFORMATION MANAGEMENT | Facility: CLINIC | Age: 61
End: 2021-10-26

## 2021-11-09 ENCOUNTER — TELEPHONE (OUTPATIENT)
Dept: ONCOLOGY | Facility: OTHER | Age: 61
End: 2021-11-09

## 2021-11-09 NOTE — TELEPHONE ENCOUNTER
"As documented on appointment --- Patient Cancelled \"Financial/Insurance Issue (waiting until insurance begins from employment to reschedule).    "

## 2021-11-09 NOTE — TELEPHONE ENCOUNTER
Patient was supposed to be set up for mammogram after our last visit early October.  Could you see what transpired with this as I see no results come through and there is nothing scanned in her chart.  She is way overdue.

## 2021-11-09 NOTE — TELEPHONE ENCOUNTER
I left patient a message that Tahmina could assist her in rescheduling mammogram. Tahmina will you please call patient. Thank you

## 2021-11-19 DIAGNOSIS — F33.0 MILD EPISODE OF RECURRENT MAJOR DEPRESSIVE DISORDER (H): ICD-10-CM

## 2021-11-23 RX ORDER — ESCITALOPRAM OXALATE 10 MG/1
10 TABLET ORAL DAILY
Qty: 90 TABLET | Refills: 0 | Status: SHIPPED | OUTPATIENT
Start: 2021-11-23 | End: 2022-02-21

## 2021-11-23 RX ORDER — ESCITALOPRAM OXALATE 20 MG/1
20 TABLET ORAL DAILY
Qty: 90 TABLET | Refills: 0 | Status: SHIPPED | OUTPATIENT
Start: 2021-11-23 | End: 2022-02-21

## 2021-11-23 NOTE — TELEPHONE ENCOUNTER
Lexpro      Last Written Prescription Date:  9.2.21  Last Fill Quantity: #90, #90,   # refills: 0  Last Office Visit: 10.13.21  Future Office visit:       Routing refill request to provider for review/approval because:

## 2021-12-14 ENCOUNTER — TELEPHONE (OUTPATIENT)
Dept: MAMMOGRAPHY | Facility: OTHER | Age: 61
End: 2021-12-14

## 2021-12-14 ENCOUNTER — TRANSFERRED RECORDS (OUTPATIENT)
Dept: HEALTH INFORMATION MANAGEMENT | Facility: CLINIC | Age: 61
End: 2021-12-14

## 2021-12-14 ENCOUNTER — ANCILLARY PROCEDURE (OUTPATIENT)
Dept: MAMMOGRAPHY | Facility: OTHER | Age: 61
End: 2021-12-14
Attending: NURSE PRACTITIONER
Payer: COMMERCIAL

## 2021-12-14 DIAGNOSIS — Z12.31 VISIT FOR SCREENING MAMMOGRAM: ICD-10-CM

## 2021-12-14 PROCEDURE — 77063 BREAST TOMOSYNTHESIS BI: CPT | Mod: TC | Performed by: RADIOLOGY

## 2021-12-14 PROCEDURE — 77067 SCR MAMMO BI INCL CAD: CPT | Mod: TC | Performed by: RADIOLOGY

## 2021-12-23 ENCOUNTER — TELEPHONE (OUTPATIENT)
Dept: ONCOLOGY | Facility: OTHER | Age: 61
End: 2021-12-23
Payer: COMMERCIAL

## 2021-12-24 NOTE — TELEPHONE ENCOUNTER
Please call patient and see if she is planning to do the genetic counseling visit as we has discussed in the past. A referral was already sent and I am hoping she will follow through with this. Please give her the number where she can call and reach them if needed.

## 2022-01-03 NOTE — TELEPHONE ENCOUNTER
Left patient a message to encourage her to see Genetic counciling and that if she can to call us back with her thoughts about going forward with genetic counciling and we can also assist her in giving her contact number to set up. I will await call back

## 2022-02-12 ENCOUNTER — TRANSFERRED RECORDS (OUTPATIENT)
Dept: HEALTH INFORMATION MANAGEMENT | Facility: CLINIC | Age: 62
End: 2022-02-12

## 2022-02-18 DIAGNOSIS — F33.0 MILD EPISODE OF RECURRENT MAJOR DEPRESSIVE DISORDER (H): ICD-10-CM

## 2022-02-18 DIAGNOSIS — N39.3 URINARY, INCONTINENCE, STRESS FEMALE: ICD-10-CM

## 2022-02-18 RX ORDER — SOLIFENACIN SUCCINATE 5 MG/1
TABLET, FILM COATED ORAL
Qty: 90 TABLET | Refills: 1 | Status: SHIPPED | OUTPATIENT
Start: 2022-02-18 | End: 2022-07-08

## 2022-02-19 NOTE — TELEPHONE ENCOUNTER
Lexapro 20 mg and  10 mg      Last Written Prescription Date:  11/23/21  Last Fill Quantity: 90,   # refills: 0  Last Office Visit: 10/13/21  Future Office visit:    Next 5 appointments (look out 90 days)    Apr 15, 2022  9:45 AM  (Arrive by 9:30 AM)  SHORT with Jackie Hitchcock CNP  [facility] ([facility] ) [address]  [place] [address]  [phone]           Routing refill request to provider for review/approval because:

## 2022-02-21 RX ORDER — ESCITALOPRAM OXALATE 20 MG/1
TABLET ORAL
Qty: 90 TABLET | Refills: 0 | Status: SHIPPED | OUTPATIENT
Start: 2022-02-21 | End: 2022-05-12

## 2022-02-21 RX ORDER — ESCITALOPRAM OXALATE 10 MG/1
TABLET ORAL
Qty: 90 TABLET | Refills: 0 | Status: SHIPPED | OUTPATIENT
Start: 2022-02-21 | End: 2022-05-12

## 2022-05-12 DIAGNOSIS — F33.0 MILD EPISODE OF RECURRENT MAJOR DEPRESSIVE DISORDER (H): ICD-10-CM

## 2022-05-12 DIAGNOSIS — I10 BENIGN ESSENTIAL HYPERTENSION: ICD-10-CM

## 2022-05-12 RX ORDER — ESCITALOPRAM OXALATE 10 MG/1
TABLET ORAL
Qty: 90 TABLET | Refills: 3 | Status: SHIPPED | OUTPATIENT
Start: 2022-05-12 | End: 2023-05-15

## 2022-05-12 RX ORDER — ESCITALOPRAM OXALATE 20 MG/1
TABLET ORAL
Qty: 90 TABLET | Refills: 3 | Status: SHIPPED | OUTPATIENT
Start: 2022-05-12 | End: 2023-05-15

## 2022-05-12 NOTE — TELEPHONE ENCOUNTER
Lexapro       Last Written Prescription Date:  2-21-22  Last Fill Quantity: 90,   # refills: 0  Last Office Visit: 10-31-21  Future Office visit:    Next 5 appointments (look out 90 days)    Jun 07, 2022 10:30 AM  (Arrive by 10:15 AM)  Return Visit with Matilde Patrick NP  [facility] ([facility] ) [address] [facility] [address] [phone]

## 2022-05-13 RX ORDER — LISINOPRIL 40 MG/1
TABLET ORAL
Qty: 90 TABLET | Refills: 0 | Status: SHIPPED | OUTPATIENT
Start: 2022-05-13 | End: 2022-07-08

## 2022-06-07 ENCOUNTER — ONCOLOGY VISIT (OUTPATIENT)
Dept: ONCOLOGY | Facility: OTHER | Age: 62
End: 2022-06-07
Attending: NURSE PRACTITIONER
Payer: COMMERCIAL

## 2022-06-07 ENCOUNTER — TRANSFERRED RECORDS (OUTPATIENT)
Dept: HEALTH INFORMATION MANAGEMENT | Facility: HOSPITAL | Age: 62
End: 2022-06-07

## 2022-06-07 VITALS
BODY MASS INDEX: 47.09 KG/M2 | DIASTOLIC BLOOD PRESSURE: 74 MMHG | HEART RATE: 96 BPM | TEMPERATURE: 97.8 F | RESPIRATION RATE: 20 BRPM | OXYGEN SATURATION: 98 % | HEIGHT: 66 IN | SYSTOLIC BLOOD PRESSURE: 130 MMHG | WEIGHT: 293 LBS

## 2022-06-07 DIAGNOSIS — Z53.9 ERRONEOUS ENCOUNTER--DISREGARD: ICD-10-CM

## 2022-06-07 DIAGNOSIS — Z86.000 HISTORY OF DUCTAL CARCINOMA IN SITU (DCIS) OF BREAST: ICD-10-CM

## 2022-06-07 DIAGNOSIS — Z91.89 AT HIGH RISK FOR BREAST CANCER: Primary | ICD-10-CM

## 2022-06-07 DIAGNOSIS — D05.01 LOBULAR CARCINOMA IN SITU OF RIGHT BREAST: ICD-10-CM

## 2022-06-07 LAB
CREATININE (EXTERNAL): 0.67 MG/DL (ref 0.4–1)
GFR ESTIMATED (EXTERNAL): >60 ML/MIN/1.73M2
GLUCOSE (EXTERNAL): 135 MG/DL (ref 70–99)
POTASSIUM (EXTERNAL): 3.5 MEQ/L (ref 3.4–5.1)

## 2022-06-07 ASSESSMENT — PAIN SCALES - GENERAL: PAINLEVEL: NO PAIN (0)

## 2022-06-07 NOTE — NURSING NOTE
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    Weight as of this encounter: 135.5 kg (298 lb 11.6 oz). Body surface area is 2.51 meters squared.  No Pain (0) Comment: Data Unavailable   No LMP recorded. Patient is postmenopausal.  Allergies reviewed: Yes  Medications reviewed: Yes    Medications: Medication refills not needed today.  Pharmacy name entered into [facility]: [facility] - [place], [place] - [address] [place] [address] AT [facility]          Darling Bain LPN            "

## 2022-06-07 NOTE — PROGRESS NOTES
"Patient here for appointment.  Complains of increasing SOB over past 2 weeks and states \"something just isn't right\".  I recommended she be evaluated in the ER and she initially declined. She finally agreed and I said we would call and ambulance and she refused to go by ambulance.  She assured me she would go.  I told her we would call and check on her tomorrow.  We will order her breast MRI and reschedule her appointment with me.  "

## 2022-06-07 NOTE — PROGRESS NOTES
High Risk Breast Cancer Follow-up Visit:  2022    Reason for Visit:  No chief complaint on file.     Nursing Note and documentation reviewed: ***    HPI:  This is a 62-year-old female patient who presents to the Oncology clinic today in follow-up of high risk breast cancer.    Patient was diagnosed with DCIS and LCIS of the right breast in 2014 and underwent lumpectomy with both completely excised.  She was placed on tamoxifen 20 mg daily in 2014 and completed 5 years of therapy discontinuing this in 2019.  She is undergoing high risk screening.    Interim HX: ***     Family History: Father prostate cancer-unsure of age (thinks 50's) at diagnoses and  at 81 of GB; mom breast cancer in 50's and  at 71 of lung issue   **Any changes to Family History since last visit:  ***     Risk Assessment:       HRT:  none  Previous Breast Biopsies: 2013 pathology showed hyperplasia without atypia and reread in 2014 as DCIS with LCIS  Exercise:  None-***  Diet:  fair  Alcohol:  Yes-not daily-may have 2 in evening 4-5 days a week  Smoking: none  Age of 1st menses:  13  Age of first pregnancy:  16 miscarraged and 27 for second pregnancy  H/o Breastfeeding:  3 months  LMP:  In 's     Genetic Counseling:    Patient declined  ***     Genetic Testing:  None     Past Medical History:   Diagnosis Date     Anxiety state, unspecified      ASCVD (arteriosclerotic cardiovascular disease) 2021     Benign essential hypertension 2015     Breast cancer (H) 2014    DCIS, LOBULAR CARCINOMA  RIGHT BREAST     Contact dermatitis and other eczema, due to unspecified cause      Ductal carcinoma in situ of right breast 2014     Ductal carcinoma in situ of right breast 2014     H/O herpes zoster      History of COVID-19 2021     Hx of heart artery stent 2021     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 2014     Morbid obesity (H) 2014     Other B-complex  deficiencies 2014     Other specified disease of hair and hair follicles      Primary central sleep apnea 2016     Reactive airway disease that is not asthma      Problem list name updated by automated process. Provider to review     Recurrent major depressive disorder (H) 2019     Unspecified immunity deficiency      Urinary, incontinence, stress female 2021     Vitamin D deficiency 2016       Past Surgical History:   Procedure Laterality Date     BIOPSY      right breast--neg     BIOPSY BREAST Right 2014    DCIS, LOBULAR CARCINOMA     BIOPSY BREAST NEEDLE LOCALIZATION  2014    Procedure: BIOPSY BREAST NEEDLE LOCALIZATION;  WIRE LOCALIZED SEGMENTAL RESECTION, RIGHT BREAST;  Surgeon: Shanika Beaver MD;  Location: [facility]     CARDIAC SURGERY      stent     CHOLECYSTECTOMY       COLONOSCOPY       COLONOSCOPY  2013     DILATION AND CURETTAGE, OPERATIVE HYSTEROSCOPY, COMBINED  2017    [facility]     LUMPECTOMY BREAST Right 2014    DCIS, LOBULAR CARCINOMA     ORTHOPEDIC SURGERY      heel surgery     ORTHOPEDIC SURGERY      rt knee meniiscus tear       Family History   Problem Relation Age of Onset     C.A.D. Father      Coronary Artery Disease Father      Cerebrovascular Disease Father      Prostate Cancer Father      Hypertension Mother      Cerebrovascular Disease Mother      Breast Cancer Mother 55         age 71 left mastectomy     Thyroid Disease Other      Thyroid Disease Sister      Diabetes No family hx of      Asthma No family hx of      Hyperlipidemia No family hx of      Colon Cancer No family hx of      Other Cancer No family hx of      Anesthesia Reaction No family hx of      Genetic Disorder No family hx of        Social History     Socioeconomic History     Marital status: Single     Spouse name: Not on file     Number of children: Not on file     Years of education: Not on file     Highest education level: Not on file   Occupational History      Not on file   Tobacco Use     Smoking status: Former Smoker     Packs/day: 0.10     Years: 30.00     Pack years: 3.00     Types: Cigarettes     Start date: 8/15/1974     Quit date: 3/3/2013     Years since quittin.2     Smokeless tobacco: Never Used   Substance and Sexual Activity     Alcohol use: Yes     Drug use: No     Sexual activity: Yes     Partners: Male   Other Topics Concern     Parent/sibling w/ CABG, MI or angioplasty before 65F 55M? No   Social History Narrative     Not on file     Social Determinants of Health     Financial Resource Strain: Not on file   Food Insecurity: Not on file   Transportation Needs: Not on file   Physical Activity: Not on file   Stress: Not on file   Social Connections: Not on file   Intimate Partner Violence: Not on file   Housing Stability: Not on file       Current Outpatient Medications   Medication     acetaminophen (TYLENOL 8 HOUR ARTHRITIS PAIN) 650 MG CR tablet     albuterol (PROAIR HFA) 108 (90 Base) MCG/ACT inhaler     amLODIPine (NORVASC) 5 MG tablet     aspirin (ASA) 81 MG EC tablet     ASPIRIN PO     atorvastatin (LIPITOR) 40 MG tablet     calcium-vitamin D (CALTRATE) 600-400 MG-UNIT per tablet     clopidogrel (PLAVIX) 75 MG tablet     cyanocobalamin ([employer] VITAMIN  B12) 1000 MCG TABS     doxylamine (UNISOM) 25 MG TABS tablet     escitalopram (LEXAPRO) 10 MG tablet     escitalopram (LEXAPRO) 20 MG tablet     lisinopril (ZESTRIL) 40 MG tablet     Multiple Vitamin (DAILY MULTIVITAMIN PO)     nitroGLYcerin (NITROSTAT) 0.4 MG sublingual tablet     nystatin (MYCOSTATIN) 472413 UNIT/GM external powder     Omega-3 Fatty Acids (OMEGA 3 PO)     solifenacin (VESICARE) 5 MG tablet     UNABLE TO FIND     VITAMIN D, CHOLECALCIFEROL, PO     No current facility-administered medications for this visit.        Allergies   Allergen Reactions     Adhesive Tape      Steri strips left scar.     Latex      Possible allergy.       Review Of Systems:  Constitutional:    denies fever,  weight changes, chills, and night sweats.  Eyes:    denies blurred or double vision  Ears/Nose/Throat:   denies ear pain, nose problems, difficulty swallowing  Respiratory:   denies shortness of breath, cough or hemoptysis  Skin:   denies rash, lesions  Breast/Chest wall:   denies pain, lumps or discharge  Cardiovascular:   denies chest pain, palpitations, edema  Gastrointestinal:   denies abdominal pain, bloating, nausea, vomiting, early satiety; no change in bowel habits or blood in stool  Genitourinary:   denies difficulty with urination, blood in urine  Musculoskeletal:    denies new muscle pain, bone pain  Neurologic:   denies lightheadedness, headaches, numbness or tingling  Psychiatric:   denies anxiety, depression  Hematologic/Lymphatic/Immunologic:   denies easy bruising, easy bleeding, lumps or bumps noted  Endocrine:   Denies increased thirst      Physical Exam:  There were no vitals taken for this visit.    GENERAL APPEARANCE: Healthy, alert and in no acute distress.  HEENT: Normocephalic, Sclerae anicteric. ***  NECK:   No asymmetry or masses, no thyromegaly.  LYMPHATICS: No palpable cervical, supraclavicular, axillary, or inguinal nodes   RESP: Lungs clear to auscultation bilaterally, respirations regular and easy  CARDIOVASCULAR: Regular rate and rhythm. Normal S1, S2; no murmur, gallop, or rub.  ABDOMEN: Soft, nontender. Bowel sounds auscultated all 4 quadrants. No palpable organomegaly or masses.  BREAST/Chest wall: Right: No masses, nipple discharge, erythema, skin thickening, nipple inversion, skin dimpling, swelling Left: No masses, nipple discharge, erythema, skin thickening, nipple inversion, skin dimpling, swelling; breasts were examined in the supine and sitting position  MUSCULOSKELETAL: Extremities without gross deformities noted. No edema of bilateral lower extremities.  SKIN: No suspicious lesions or rashes to exposed skin  NEURO: Alert and oriented x 3.  Gait steady.  PSYCHIATRIC:  Mentation and affect appear normal.  Mood appropriate.    Laboratory:  No results found for any visits on 06/07/22.    Imaging Studies:      EXAM: MA SCREENING BILATERAL W/ VIKA, 12/14/2021 7:57 AM     COMPARISONS: January 24, 2020     HISTORY: Screening mammogram/DCIS/history of LCIS right breast  undergoing lumpectomy; Visit for screening mammogram     FINDINGS: Postbiopsy changes are noted in the right breast in the  upper outer quadrant. No new dominant masses or malignant  calcifications are noted     BREAST DENSITY: Scattered fibroglandular densities.                                                                      IMPRESSION: BI-RADS CATEGORY: 2 - Benign.     RECOMMENDED FOLLOW-UP: Annual Mammography.        The images were reviewed by R2 computer aided detection.     RHONDA LINARES MD        ASSESSMENT/PLAN:    #1 History of DCIS: History of ductal carcinoma in situ of the right breast and lobular carcinoma in situ of the right breast diagnosed January 2014.  She underwent lumpectomy and was placed on tamoxifen 20 mg daily and completed 5 years of therapy.       #2 history of LCIS: Lobular carcinoma in situ completely excised January 2014.  She completed 5 years of tamoxifen therapy as above.  Plan is to continue with high risk screening.     We will schedule her for a bilateral breast MRI per NCCN guidelines.  I will see her back in 6 months for clinical breast exam with plan for mammogram at that time.  ** We will also send a referral to the Cancer Risk Management group for genetic counseling and I strongly encouraged her to follow through with this related to her personal and family history.    Individualized Surveillance Plan for women  With 20% or greater lifetime risk of breast cancer   Per NCCN Breast Cancer Screening and Diagnosis Guidelines Version 1.2021   Recommended screening Test or procedure Last done Next Scheduled    Clinical encounter Clinical exam every 6-12 months.   Refer to genetic  counseling if not already done.  Consider risk reduction strategies. 6/7/2022  Discussed and referred 12/2022   However, some family histories with breast cancers at a very young age, may warrant screening starting earlier.    *May begin at age 40 if breast cancers in the family occur at later ages.    Annual mammogram beginning 10 years younger than the earliest breast cancer in the family but not prior to age 30.    Recommend annual breast MRI to begin 10 years younger than the earliest breast cancer in the family but not prior to age 25.    Breast MRIs are preferably done on day 7-15 of the menstrual cycle in premenopausal women. See below See below   Breast screening for patients at high risk due to thoracic radiation between the ages of 10-30   Annual clinical exam beginning 8 years after radiation therapy.    Annual screening mammogram beginning at age 30 or 8 years after radiation therapy    Annual breast MRI, beginning at age 25 or 8 years after radiation therapy.       n/a     Na   Women who have a lifetime risk of >20% based on history of LCIS or ADH/ALH Annual screening mammogram beginning at age of LCIS or ADH/ALH but not prior to age 30.    Consider annual MRI to begin at age of diagnosis of LCIS or ADH/ALH but not prior to age 25.    Consider risk reducing strategies. 12/2021 2/2021 12/2022        Plan 6/2022    Recommend risk reducing strategies for women with 1.7% 5 year risk of breast cancer. Took 5 years Cade         We discussed the following healthy lifestyle plan recommended by both NCCN and the American Cancer Society that can reduce the risk of breast cancer.       1.  Limit alcohol consumption to less than 1 drink per day (1 drink = 5 oz of wine, 12 oz of beer or 1.5 oz of 80 proof liquor).  2.  Exercise per American Cancer Society guidelines of at least 150 minutes of moderate intensity activity or 75 minutes of vigorous activity each week.  (Or a combination of both.)  Exercise should  be spread out over the week.  Regular recreational exercise has been shown to reduce the risk of cancer by 30%.  3.  Maintain a healthy weight with a body mass index between 19-24.9.  A postmenopausal BMI of 30.7 has been shown to have increased the risk for breast cancer by 37% in some studies.    4.  Do not use tobacco products and limit exposure to passive smoke.  5.  Breast-feed if possible.  Research shows that 16+ months of breast-feeding, over a lifetime, can reduce a woman's risk of breast cancer by up to 27%.  (Data also from: Melba Cervantes.  Estrogen and the risk of breast cancer.  [place] Journal of Medicine 2001; 344: 276.).     I encouraged patient to call with any questions or concerns.      Approximately *** minutes spent in the patient's encounter today with time spent reviewing her chart along with chart preparation and reviewing NCCN latest guidelines for high risk breast cancer management.  Time was also spent with the patient taking a review of systems and performing a physical exam. Time was spent in discussion of surveillance along with recommendations to follow through with a genetic counseling visit related to her personal and family history.  Time was also spent in discussion of prevention.  Time was also spent in planning her next follow-up and ordering her mammogram. Time was also spent in documentation.     Matilde Patrick, NP  APRN, FNP-BC, AOCNP

## 2022-06-07 NOTE — PATIENT INSTRUCTIONS
Patient not seen for visit today.  She will be seen in the [facility] ER.  Orders placed for Breast MRI and we will schedule her to see me in follow up in [facility] for the results.

## 2022-06-08 ENCOUNTER — TELEPHONE (OUTPATIENT)
Dept: MAMMOGRAPHY | Facility: OTHER | Age: 62
End: 2022-06-08
Payer: COMMERCIAL

## 2022-06-14 ENCOUNTER — TELEPHONE (OUTPATIENT)
Dept: MAMMOGRAPHY | Facility: OTHER | Age: 62
End: 2022-06-14
Payer: COMMERCIAL

## 2022-06-29 ENCOUNTER — TRANSFERRED RECORDS (OUTPATIENT)
Dept: HEALTH INFORMATION MANAGEMENT | Facility: CLINIC | Age: 62
End: 2022-06-29

## 2022-06-29 LAB — EJECTION FRACTION: NORMAL %

## 2022-06-30 ENCOUNTER — TELEPHONE (OUTPATIENT)
Dept: ONCOLOGY | Facility: OTHER | Age: 62
End: 2022-06-30

## 2022-06-30 NOTE — TELEPHONE ENCOUNTER
I called patient to remind her to call and get her MRI of the breasts scheduled and then we will schedule a follow up with Birdie after she is scheduled. They have reached out to schedule her multiple times and patient has not returned messages

## 2022-07-07 DIAGNOSIS — N39.3 URINARY, INCONTINENCE, STRESS FEMALE: ICD-10-CM

## 2022-07-07 DIAGNOSIS — I10 BENIGN ESSENTIAL HYPERTENSION: ICD-10-CM

## 2022-07-08 RX ORDER — LISINOPRIL 40 MG/1
TABLET ORAL
Qty: 90 TABLET | Refills: 0 | Status: SHIPPED | OUTPATIENT
Start: 2022-07-08 | End: 2022-11-18

## 2022-07-08 RX ORDER — SOLIFENACIN SUCCINATE 5 MG/1
TABLET, FILM COATED ORAL
Qty: 90 TABLET | Refills: 0 | Status: SHIPPED | OUTPATIENT
Start: 2022-07-08 | End: 2022-11-18

## 2022-07-13 ENCOUNTER — TELEPHONE (OUTPATIENT)
Dept: ONCOLOGY | Facility: OTHER | Age: 62
End: 2022-07-13

## 2022-07-13 NOTE — TELEPHONE ENCOUNTER
I left message to remind patient to call [facility] scheduling to have MRI of breasts set up. [facility] has called patient and left messages previously to assist in scheduling.

## 2022-07-15 ENCOUNTER — TELEPHONE (OUTPATIENT)
Dept: MAMMOGRAPHY | Facility: OTHER | Age: 62
End: 2022-07-15

## 2022-07-15 NOTE — TELEPHONE ENCOUNTER
Spoke with patient, Reviewed MRI questions and paperwork. Transferred to scheduling to make MRI appt. [facility]

## 2022-07-21 ENCOUNTER — TRANSFERRED RECORDS (OUTPATIENT)
Dept: HEALTH INFORMATION MANAGEMENT | Facility: CLINIC | Age: 62
End: 2022-07-21

## 2022-07-21 LAB
EJECTION FRACTION: 65 %
EJECTION FRACTION: 65 %

## 2022-07-27 ENCOUNTER — TELEPHONE (OUTPATIENT)
Dept: FAMILY MEDICINE | Facility: OTHER | Age: 62
End: 2022-07-27

## 2022-07-27 NOTE — TELEPHONE ENCOUNTER
Pt calling wanting to know her stress test result that she had done last week in [place] that was ordered by a .    She states she should have had the results by now.    Updated that do not see and may need to get report to PCP. She states forget it and hung up on writer.    Please note/advise.    .Milly Horne RN

## 2022-07-27 NOTE — TELEPHONE ENCOUNTER
There is no report in care everywhere, so it may not be dictated  Result will go to her from the ordering provider at [facility]  [phone]

## 2022-08-04 ENCOUNTER — TELEPHONE (OUTPATIENT)
Dept: ONCOLOGY | Facility: OTHER | Age: 62
End: 2022-08-04

## 2022-08-04 NOTE — TELEPHONE ENCOUNTER
Patient cancelled MRI of breasts. She is scheduled for follow up on 8/30/2022. Do we need to cancel appointment.

## 2022-08-09 NOTE — TELEPHONE ENCOUNTER
Note placed in appointments that even tho she cancelled her MRI, Birdie would like to see her. TRUDY Martel

## 2022-08-17 DIAGNOSIS — I10 ESSENTIAL HYPERTENSION: ICD-10-CM

## 2022-08-19 RX ORDER — AMLODIPINE BESYLATE 5 MG/1
TABLET ORAL
Qty: 90 TABLET | Refills: 0 | Status: SHIPPED | OUTPATIENT
Start: 2022-08-19 | End: 2022-11-18

## 2022-08-30 DIAGNOSIS — J98.9 REACTIVE AIRWAY DISEASE WITHOUT ASTHMA: ICD-10-CM

## 2022-08-31 RX ORDER — ALBUTEROL SULFATE 90 UG/1
AEROSOL, METERED RESPIRATORY (INHALATION)
Qty: 8.5 G | Refills: 0 | Status: SHIPPED | OUTPATIENT
Start: 2022-08-31 | End: 2022-12-16

## 2022-08-31 NOTE — TELEPHONE ENCOUNTER
Albuterol HFA      Last Written Prescription Date:  12/8/20  Last Fill Quantity: 8.5 g,   # refills: 1  Last Office Visit: 10/13/21  Future Office visit:       Routing refill request to provider for review/approval because:

## 2022-11-16 DIAGNOSIS — I10 ESSENTIAL HYPERTENSION: ICD-10-CM

## 2022-11-16 DIAGNOSIS — N39.3 URINARY, INCONTINENCE, STRESS FEMALE: ICD-10-CM

## 2022-11-16 DIAGNOSIS — I10 BENIGN ESSENTIAL HYPERTENSION: ICD-10-CM

## 2022-11-18 RX ORDER — LISINOPRIL 40 MG/1
TABLET ORAL
Qty: 90 TABLET | Refills: 0 | Status: SHIPPED | OUTPATIENT
Start: 2022-11-18 | End: 2023-02-15

## 2022-11-18 RX ORDER — AMLODIPINE BESYLATE 5 MG/1
TABLET ORAL
Qty: 90 TABLET | Refills: 0 | Status: SHIPPED | OUTPATIENT
Start: 2022-11-18 | End: 2023-02-15

## 2022-11-18 RX ORDER — SOLIFENACIN SUCCINATE 5 MG/1
TABLET, FILM COATED ORAL
Qty: 90 TABLET | Refills: 0 | Status: SHIPPED | OUTPATIENT
Start: 2022-11-18 | End: 2022-12-16

## 2022-11-18 NOTE — TELEPHONE ENCOUNTER
amLODIPine      Last Written Prescription Date:  8/19/22  Last Fill Quantity: 90,   # refills: 0  Last Office Visit: 6/29/22  Future Office visit:       Routing refill request to provider for review/approval because:    lisinopril      Last Written Prescription Date:  7/8/22  Last Fill Quantity: 90,   # refills: 0  Last Office Visit: 6/29/22  Future Office visit:       Routing refill request to provider for review/approval because:      solifenacin      Last Written Prescription Date:  7/8/22  Last Fill Quantity: 90,   # refills: 0  Last Office Visit: 6/29/22  Future Office visit:       Routing refill request to provider for review/approval because:

## 2022-12-12 ENCOUNTER — TELEPHONE (OUTPATIENT)
Dept: FAMILY MEDICINE | Facility: OTHER | Age: 62
End: 2022-12-12

## 2022-12-12 NOTE — PROGRESS NOTES
Assessment & Plan          Essential hypertension  - Continue plan of care  - Lipid Profile (Chol, Trig, HDL, LDL calc)  - Comprehensive metabolic panel  - TSH with free T4 reflex      ASCVD (arteriosclerotic cardiovascular disease)  - Lipid Profile (Chol, Trig, HDL, LDL calc)  - Comprehensive metabolic panel  - atorvastatin (LIPITOR) 40 MG tablet; Take 1 tablet (40 mg) by mouth daily  - nitroGLYcerin (NITROSTAT) 0.4 MG sublingual tablet; Place 1 tablet (0.4 mg) under the tongue every 5 minutes as needed for chest pain      Moderate episode of recurrent major depressive disorder (H)  - Continue plan of care      Anxiety  - Continue plan of care      Chronic fatigue  - Vitamin D level  - CBC with platelets and differential      Vitamin D deficiency  - Vitamin D level  - Vitamin D3 OTC 5000 U daily      Special screening for malignant neoplasms, colon  - Adult General Surg Referral      Encounter for screening mammogram for malignant neoplasm of breast  - Breast MRI upcoming  - Follow-up with Oncology      Personal history of malignant neoplasm of breast  - Adult General Surg Referral  - Follow-up with Oncology      ACP (advance care planning)  - Paperwork given      Chronic rhinitis  - montelukast (SINGULAIR) 10 MG tablet; Take 1 tablet (10 mg) by mouth At Bedtime  - OTC allergy med such as palin Zyrtec in the am  - Humidifier at home - add bacteriostatic humidifier solution      History of claustrophobia  - diazepam (VALIUM) 5 MG tablet; 1-2 po 30 minutes before MRI      Reactive airway disease   - albuterol (PROAIR HFA/PROVENTIL HFA/VENTOLIN HFA) 108 (90 Base) MCG/ACT inhaler; Inhale 2 Puffs into the lungs every four hours as needed for shortness of breath/dyspnea or wheezing.        40  minutes spent on the date of the encounter doing chart review, review of outside records, review of test results, interpretation of tests, patient visit and documentation          Return in about 6 months (around  2023).        Jackie Hitchcock, CNP  [facility] - ANDRE Ross is a 62 year old, presenting for the following health issues:  RECHECK, Hypertension, Lipids, Depression, and Anxiety        Hypertension Follow-up    Do you check your blood pressure regularly outside of the clinic? No     Are you following a low salt diet? No- tries to watch salt intake     Are your blood pressures ever more than 140 on the top number (systolic) OR more   than 90 on the bottom number (diastolic), for example 140/90? No       Coronary Vascular Disease Follow-up  Follows with Cardiology    How often do you take nitroglycerin? Never    Do you take an aspirin every day? Yes      Chronic Fatigue  Lab due       Depression and Anxiety Follow-Up    How are you doing with your depression since your last visit? Fatigue, little interest in doing anything     How are you doing with your anxiety since your last visit?  Worsened - work related concerns - patient reports she is going to be quitting her job    Are you having other symptoms that might be associated with depression or anxiety? No    Have you had a significant life event? Job Concerns     Do you have any concerns with your use of alcohol or other drugs? No        Chronic Rhinitis  Clear drainage  Daily, causes cough      Social History     Tobacco Use     Smoking status: Former     Packs/day: 0.10     Years: 30.00     Pack years: 3.00     Types: Cigarettes     Start date: 8/15/1974     Quit date: 3/3/2013     Years since quittin.7     Smokeless tobacco: Never   Substance Use Topics     Alcohol use: Yes     Drug use: No         PHQ 2021 10/13/2021 2022   PHQ-9 Total Score 3 6 5   Q9: Thoughts of better off dead/self-harm past 2 weeks Not at all Not at all Not at all         REUBEN-7 SCORE 2021 10/13/2021 2022   Total Score 4 2 5         Last PHQ-9 2022   1.  Little interest or pleasure in doing things 1   2.  Feeling down, depressed, or  hopeless 1   3.  Trouble falling or staying asleep, or sleeping too much 2   4.  Feeling tired or having little energy 1   5.  Poor appetite or overeating 0   6.  Feeling bad about yourself 0   7.  Trouble concentrating 0   8.  Moving slowly or restless 0   Q9: Thoughts of better off dead/self-harm past 2 weeks 0   PHQ-9 Total Score 5   Difficulty at work, home, or with people Somewhat difficult         REUBEN-7  12/16/2022   1. Feeling nervous, anxious, or on edge 1   2. Not being able to stop or control worrying 1   3. Worrying too much about different things 1   4. Trouble relaxing 0   5. Being so restless that it is hard to sit still 0   6. Becoming easily annoyed or irritable 1   7. Feeling afraid, as if something awful might happen 1   REUBEN-7 Total Score 5   If you checked any problems, how difficult have they made it for you to do your work, take care of things at home, or get along with other people? 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     Ductal carcinoma in situ (DCIS) of right breast     Anxiety     Reactive airway disease that is not asthma     Contact dermatitis and other eczema, due to unspecified cause     Essential hypertension     Vitamin D deficiency     ACP (advance care planning)     Use of tamoxifen (Nolvadex)     Morbid obesity with body mass index (BMI) of 40.0 to 44.9 in adult (H)     Recurrent major depressive disorder (H)     History of COVID-19     Urinary, incontinence, stress female     ASCVD (arteriosclerotic cardiovascular disease)     Hx of heart artery stent     Chondromalacia of patella     Disorder of refraction and accommodation     Medial meniscus tear     Impaired gait and mobility     Impairment of balance     Right knee pain     Chronic fatigue     Past Surgical History:   Procedure Laterality Date     BIOPSY      right breast--neg     BIOPSY BREAST Right 02/13/2014    DCIS, LOBULAR CARCINOMA      BIOPSY BREAST NEEDLE LOCALIZATION  2014    Procedure: BIOPSY BREAST NEEDLE LOCALIZATION;  WIRE LOCALIZED SEGMENTAL RESECTION, RIGHT BREAST;  Surgeon: Shanika Beaver MD;  Location: [facility]     CARDIAC SURGERY      stent     CHOLECYSTECTOMY       COLONOSCOPY       COLONOSCOPY  2013     DILATION AND CURETTAGE, OPERATIVE HYSTEROSCOPY, COMBINED  2017    [facility]     LUMPECTOMY BREAST Right 2014    DCIS, LOBULAR CARCINOMA     ORTHOPEDIC SURGERY      heel surgery     ORTHOPEDIC SURGERY      rt knee meniiscus tear       Social History     Tobacco Use     Smoking status: Former     Packs/day: 0.10     Years: 30.00     Pack years: 3.00     Types: Cigarettes     Start date: 8/15/1974     Quit date: 3/3/2013     Years since quittin.7     Smokeless tobacco: Never   Substance Use Topics     Alcohol use: Yes     Family History   Problem Relation Age of Onset     C.A.D. Father      Coronary Artery Disease Father      Cerebrovascular Disease Father      Prostate Cancer Father      Hypertension Mother      Cerebrovascular Disease Mother      Breast Cancer Mother 55         age 71 left mastectomy     Thyroid Disease Other      Thyroid Disease Sister      Diabetes No family hx of      Asthma No family hx of      Hyperlipidemia No family hx of      Colon Cancer No family hx of      Other Cancer No family hx of      Anesthesia Reaction No family hx of      Genetic Disorder No family hx of            Current Outpatient Medications   Medication Sig Dispense Refill     albuterol (PROAIR HFA/PROVENTIL HFA/VENTOLIN HFA) 108 (90 Base) MCG/ACT inhaler Inhale 2 Puffs into the lungs every four hours as needed for shortness of breath/dyspnea or wheezing. 8.5 g 0     atorvastatin (LIPITOR) 40 MG tablet Take 1 tablet (40 mg) by mouth daily 90 tablet 1     diazepam (VALIUM) 5 MG tablet 1-2 po 30 minutes before MRI 2 tablet 0     montelukast (SINGULAIR) 10 MG tablet Take 1 tablet (10 mg) by mouth At Bedtime 90  tablet 3     nitroGLYcerin (NITROSTAT) 0.4 MG sublingual tablet Place 1 tablet (0.4 mg) under the tongue every 5 minutes as needed for chest pain 8 tablet 0     acetaminophen (TYLENOL) 650 MG CR tablet Take 650 mg by mouth every 8 hours as needed for mild pain or fever       amLODIPine (NORVASC) 5 MG tablet Take 1 Tablet by mouth one time a day. 90 tablet 0     aspirin (ASA) 81 MG EC tablet Take 1 tablet (81 mg) by mouth daily 90 tablet 11     calcium-vitamin D (CALTRATE) 600-400 MG-UNIT per tablet Take 1 tablet by mouth 2 times daily       clopidogrel (PLAVIX) 75 MG tablet Take 1 tablet (75 mg) by mouth daily 90 tablet 1     doxylamine (UNISOM) 25 MG TABS tablet Take 25 mg by mouth nightly as needed 1-2 at HS as needed       escitalopram (LEXAPRO) 10 MG tablet Take 1 Tablet by mouth one time a day. Take along with 20mg tablet for a total dose of 30mg. 90 tablet 3     escitalopram (LEXAPRO) 20 MG tablet Take 1 Tablet by mouth one time a day. Take along with 10mg tablet for a total dose of 30mg. 90 tablet 3     lisinopril (ZESTRIL) 40 MG tablet Take 1 Tablet by mouth one time a day. 90 tablet 0     Multiple Vitamin (DAILY MULTIVITAMIN PO) Take one tablet by oral route every day with food       nystatin (MYCOSTATIN) 538561 UNIT/GM external powder Apply 100,000 g topically 2 times daily as needed (Patient not taking: Reported on 6/7/2022) 60 g 3     Omega-3 Fatty Acids (OMEGA 3 PO) Take 1,000 mg by mouth daily        vitamin B-12 (CYANOCOBALAMIN) 1000 MCG tablet Take 1,000 mcg by mouth daily 30 tablet      VITAMIN D, CHOLECALCIFEROL, PO Take 5,000 Units by mouth daily            Allergies   Allergen Reactions     Adhesive Tape      Steri strips left scar.     Latex      Possible allergy.         Recent Labs   Lab Test 10/05/21  1019 07/14/21  1511 04/06/21  1013 02/03/21  0000 01/11/21  1617 06/16/18  0000 02/20/18  1107 08/03/15  1545 03/02/15  1548   LDL  --   --  90  --   --   --  99  --  80   HDL  --   --  59  --    --   --  50  --  45*   TRIG  --   --  123  --   --   --  113  --  175*   ALT 32 35 47  --  35   < >  --    < > 30   CR 0.70 0.81 0.58   < > 0.61   < >  --    < > 0.61   GFRESTIMATED >90 78 >90   < > >90   < >  --    < > >90  Non  GFR Calc     GFRESTBLACK  --   --  >90  --  >90   < >  --    < > >90   GFR Calc     POTASSIUM 4.4 3.9 3.7   < > 3.7   < >  --    < > 3.9   TSH  --   --  0.87  --  2.90  --  0.95   < >  --     < > = values in this interval not displayed.          BP Readings from Last 3 Encounters:   12/16/22 134/80   06/07/22 130/74   10/13/21 138/78    Wt Readings from Last 3 Encounters:   12/16/22 134.7 kg (297 lb)   06/07/22 135.5 kg (298 lb 11.6 oz)   10/13/21 135.7 kg (299 lb 3.2 oz)                 Review of Systems   Constitutional, HEENT, cardiovascular, pulmonary, GI, , musculoskeletal, neuro, skin, endocrine and psych systems are negative, except as otherwise noted.          Objective    /80 (BP Location: Left arm, Patient Position: Sitting, Cuff Size: Adult Large)   Pulse 98   Temp 99  F (37.2  C) (Tympanic)   Resp 24   Wt 134.7 kg (297 lb)   SpO2 98%   BMI 47.94 kg/m    Body mass index is 47.94 kg/m .         Physical Exam   GENERAL: healthy, alert and no distress  EYES: Eyes grossly normal to inspection, PERRL and conjunctivae and sclerae normal  HENT: ear canals and TM's normal, nose and mouth without ulcers or lesions  NECK: no adenopathy, no asymmetry, masses, or scars and thyroid normal to palpation  RESP: lungs clear to auscultation - no rales, rhonchi or wheezes  CV: regular rate and rhythm, normal S1 S2, no S3 or S4, no murmur, click or rub, no peripheral edema and peripheral pulses strong  SKIN: no suspicious lesions or rashes  PSYCH: mentation appears normal, affect normal/bright            Results for orders placed or performed in visit on 12/16/22   Lipid Profile (Chol, Trig, HDL, LDL calc)     Status: Normal   Result Value Ref Range     Cholesterol 131 <200 mg/dL    Triglycerides 125 <150 mg/dL    Direct Measure HDL 54 >=50 mg/dL    LDL Cholesterol Calculated 52 <=100 mg/dL    Non HDL Cholesterol 77 <130 mg/dL    Narrative    Cholesterol  Desirable:  <200 mg/dL    Triglycerides  Normal:  Less than 150 mg/dL  Borderline High:  150-199 mg/dL  High:  200-499 mg/dL  Very High:  Greater than or equal to 500 mg/dL    Direct Measure HDL  Female:  Greater than or equal to 50 mg/dL   Male:  Greater than or equal to 40 mg/dL    LDL Cholesterol  Desirable:  <100mg/dL  Above Desirable:  100-129 mg/dL   Borderline High:  130-159 mg/dL   High:  160-189 mg/dL   Very High:  >= 190 mg/dL    Non HDL Cholesterol  Desirable:  130 mg/dL  Above Desirable:  130-159 mg/dL  Borderline High:  160-189 mg/dL  High:  190-219 mg/dL  Very High:  Greater than or equal to 220 mg/dL   Comprehensive metabolic panel     Status: Abnormal   Result Value Ref Range    Sodium 137 136 - 145 mmol/L    Potassium 4.3 3.4 - 5.3 mmol/L    Chloride 99 98 - 107 mmol/L    Carbon Dioxide (CO2) 22 22 - 29 mmol/L    Anion Gap 16 (H) 7 - 15 mmol/L    Urea Nitrogen 13.0 8.0 - 23.0 mg/dL    Creatinine 0.56 0.51 - 0.95 mg/dL    Calcium 9.3 8.8 - 10.2 mg/dL    Glucose 96 70 - 99 mg/dL    Alkaline Phosphatase 70 35 - 104 U/L    AST 20 10 - 35 U/L    ALT 28 10 - 35 U/L    Protein Total 7.9 6.4 - 8.3 g/dL    Albumin 4.5 3.5 - 5.2 g/dL    Bilirubin Total 0.7 <=1.2 mg/dL    GFR Estimate >90 >60 mL/min/1.73m2   TSH with free T4 reflex     Status: Normal   Result Value Ref Range    TSH 1.27 0.30 - 4.20 uIU/mL   CBC with platelets and differential     Status: None   Result Value Ref Range    WBC Count 8.7 4.0 - 11.0 10e3/uL    RBC Count 4.44 3.80 - 5.20 10e6/uL    Hemoglobin 13.9 11.7 - 15.7 g/dL    Hematocrit 41.6 35.0 - 47.0 %    MCV 94 78 - 100 fL    MCH 31.3 26.5 - 33.0 pg    MCHC 33.4 31.5 - 36.5 g/dL    RDW 12.8 10.0 - 15.0 %    Platelet Count 307 150 - 450 10e3/uL    % Neutrophils 65 %    % Lymphocytes 27  %    % Monocytes 7 %    % Eosinophils 1 %    % Basophils 0 %    Absolute Neutrophils 5.6 1.6 - 8.3 10e3/uL    Absolute Lymphocytes 2.3 0.8 - 5.3 10e3/uL    Absolute Monocytes 0.6 0.0 - 1.3 10e3/uL    Absolute Eosinophils 0.1 0.0 - 0.7 10e3/uL    Absolute Basophils 0.0 0.0 - 0.2 10e3/uL   CBC with platelets and differential     Status: None    Narrative    The following orders were created for panel order CBC with platelets and differential.  Procedure                               Abnormality         Status                     ---------                               -----------         ------                     CBC with platelets and d...[[id number]]                      Final result                 Please view results for these tests on the individual orders.

## 2022-12-12 NOTE — TELEPHONE ENCOUNTER
11:48 AM    Reason for Call: OVERBOOK    Patient is having the following symptoms:     Patient calling to set up her high blood pressure medication review and she also wants to set up a colonoscopy at the  she wants both appointments before the end of the year she is retiring from  before the end of the year and her insurance will be changing in the new year. The first opening with Jackie Hitchcock isn't until January 2023.     Was an appointment offered for this call? No  If yes : Appointment type              Date    Preferred method for responding to this message: Telephone Call  What is your phone number ?    If we cannot reach you directly, may we leave a detailed response at the number you provided? Yes    Can this message wait until your PCP/provider returns, if unavailable today? No, patient notified the provider is out of the office today and she should be returning on Tuesday and she will get a phone call back then.

## 2022-12-16 ENCOUNTER — OFFICE VISIT (OUTPATIENT)
Dept: FAMILY MEDICINE | Facility: OTHER | Age: 62
End: 2022-12-16
Attending: NURSE PRACTITIONER
Payer: COMMERCIAL

## 2022-12-16 VITALS
SYSTOLIC BLOOD PRESSURE: 134 MMHG | DIASTOLIC BLOOD PRESSURE: 80 MMHG | RESPIRATION RATE: 24 BRPM | TEMPERATURE: 99 F | HEART RATE: 98 BPM | WEIGHT: 293 LBS | BODY MASS INDEX: 47.94 KG/M2 | OXYGEN SATURATION: 98 %

## 2022-12-16 DIAGNOSIS — Z86.59 HISTORY OF CLAUSTROPHOBIA: ICD-10-CM

## 2022-12-16 DIAGNOSIS — F41.9 ANXIETY: ICD-10-CM

## 2022-12-16 DIAGNOSIS — Z85.3 PERSONAL HISTORY OF MALIGNANT NEOPLASM OF BREAST: ICD-10-CM

## 2022-12-16 DIAGNOSIS — R53.82 CHRONIC FATIGUE: ICD-10-CM

## 2022-12-16 DIAGNOSIS — J31.0 CHRONIC RHINITIS: ICD-10-CM

## 2022-12-16 DIAGNOSIS — Z12.11 SPECIAL SCREENING FOR MALIGNANT NEOPLASMS, COLON: Primary | ICD-10-CM

## 2022-12-16 DIAGNOSIS — E55.9 VITAMIN D DEFICIENCY: ICD-10-CM

## 2022-12-16 DIAGNOSIS — Z12.31 ENCOUNTER FOR SCREENING MAMMOGRAM FOR MALIGNANT NEOPLASM OF BREAST: ICD-10-CM

## 2022-12-16 DIAGNOSIS — I10 ESSENTIAL HYPERTENSION: ICD-10-CM

## 2022-12-16 DIAGNOSIS — I25.10 ASCVD (ARTERIOSCLEROTIC CARDIOVASCULAR DISEASE): ICD-10-CM

## 2022-12-16 DIAGNOSIS — F33.1 MODERATE EPISODE OF RECURRENT MAJOR DEPRESSIVE DISORDER (H): ICD-10-CM

## 2022-12-16 DIAGNOSIS — J98.9 REACTIVE AIRWAY DISEASE WITHOUT ASTHMA: ICD-10-CM

## 2022-12-16 DIAGNOSIS — Z71.89 ACP (ADVANCE CARE PLANNING): ICD-10-CM

## 2022-12-16 PROBLEM — Z96.651 STATUS POST TOTAL RIGHT KNEE REPLACEMENT: Status: RESOLVED | Noted: 2021-10-13 | Resolved: 2022-12-16

## 2022-12-16 PROBLEM — Z78.9 IMPAIRED MOTOR CONTROL: Status: RESOLVED | Noted: 2021-08-16 | Resolved: 2022-12-16

## 2022-12-16 LAB
ALBUMIN SERPL BCG-MCNC: 4.5 G/DL (ref 3.5–5.2)
ALP SERPL-CCNC: 70 U/L (ref 35–104)
ALT SERPL W P-5'-P-CCNC: 28 U/L (ref 10–35)
ANION GAP SERPL CALCULATED.3IONS-SCNC: 16 MMOL/L (ref 7–15)
AST SERPL W P-5'-P-CCNC: 20 U/L (ref 10–35)
BASOPHILS # BLD AUTO: 0 10E3/UL (ref 0–0.2)
BASOPHILS NFR BLD AUTO: 0 %
BILIRUB SERPL-MCNC: 0.7 MG/DL
BUN SERPL-MCNC: 13 MG/DL (ref 8–23)
CALCIUM SERPL-MCNC: 9.3 MG/DL (ref 8.8–10.2)
CHLORIDE SERPL-SCNC: 99 MMOL/L (ref 98–107)
CHOLEST SERPL-MCNC: 131 MG/DL
CREAT SERPL-MCNC: 0.56 MG/DL (ref 0.51–0.95)
DEPRECATED HCO3 PLAS-SCNC: 22 MMOL/L (ref 22–29)
EOSINOPHIL # BLD AUTO: 0.1 10E3/UL (ref 0–0.7)
EOSINOPHIL NFR BLD AUTO: 1 %
ERYTHROCYTE [DISTWIDTH] IN BLOOD BY AUTOMATED COUNT: 12.8 % (ref 10–15)
GFR SERPL CREATININE-BSD FRML MDRD: >90 ML/MIN/1.73M2
GLUCOSE SERPL-MCNC: 96 MG/DL (ref 70–99)
HCT VFR BLD AUTO: 41.6 % (ref 35–47)
HDLC SERPL-MCNC: 54 MG/DL
HGB BLD-MCNC: 13.9 G/DL (ref 11.7–15.7)
LDLC SERPL CALC-MCNC: 52 MG/DL
LYMPHOCYTES # BLD AUTO: 2.3 10E3/UL (ref 0.8–5.3)
LYMPHOCYTES NFR BLD AUTO: 27 %
MCH RBC QN AUTO: 31.3 PG (ref 26.5–33)
MCHC RBC AUTO-ENTMCNC: 33.4 G/DL (ref 31.5–36.5)
MCV RBC AUTO: 94 FL (ref 78–100)
MONOCYTES # BLD AUTO: 0.6 10E3/UL (ref 0–1.3)
MONOCYTES NFR BLD AUTO: 7 %
NEUTROPHILS # BLD AUTO: 5.6 10E3/UL (ref 1.6–8.3)
NEUTROPHILS NFR BLD AUTO: 65 %
NONHDLC SERPL-MCNC: 77 MG/DL
PLATELET # BLD AUTO: 307 10E3/UL (ref 150–450)
POTASSIUM SERPL-SCNC: 4.3 MMOL/L (ref 3.4–5.3)
PROT SERPL-MCNC: 7.9 G/DL (ref 6.4–8.3)
RBC # BLD AUTO: 4.44 10E6/UL (ref 3.8–5.2)
SODIUM SERPL-SCNC: 137 MMOL/L (ref 136–145)
TRIGL SERPL-MCNC: 125 MG/DL
TSH SERPL DL<=0.005 MIU/L-ACNC: 1.27 UIU/ML (ref 0.3–4.2)
WBC # BLD AUTO: 8.7 10E3/UL (ref 4–11)

## 2022-12-16 PROCEDURE — 99215 OFFICE O/P EST HI 40 MIN: CPT | Performed by: NURSE PRACTITIONER

## 2022-12-16 PROCEDURE — 80061 LIPID PANEL: CPT | Performed by: NURSE PRACTITIONER

## 2022-12-16 PROCEDURE — 80050 GENERAL HEALTH PANEL: CPT | Performed by: NURSE PRACTITIONER

## 2022-12-16 PROCEDURE — 36415 COLL VENOUS BLD VENIPUNCTURE: CPT | Performed by: NURSE PRACTITIONER

## 2022-12-16 PROCEDURE — 82306 VITAMIN D 25 HYDROXY: CPT | Performed by: NURSE PRACTITIONER

## 2022-12-16 RX ORDER — NITROGLYCERIN 0.4 MG/1
0.4 TABLET SUBLINGUAL EVERY 5 MIN PRN
Qty: 8 TABLET | Refills: 0 | Status: SHIPPED | OUTPATIENT
Start: 2022-12-16 | End: 2024-07-24

## 2022-12-16 RX ORDER — MONTELUKAST SODIUM 10 MG/1
10 TABLET ORAL AT BEDTIME
Qty: 90 TABLET | Refills: 3 | Status: SHIPPED | OUTPATIENT
Start: 2022-12-16 | End: 2023-10-30

## 2022-12-16 RX ORDER — DIAZEPAM 5 MG
TABLET ORAL
Qty: 2 TABLET | Refills: 0 | Status: SHIPPED | OUTPATIENT
Start: 2022-12-16 | End: 2023-12-04

## 2022-12-16 RX ORDER — ATORVASTATIN CALCIUM 40 MG/1
40 TABLET, FILM COATED ORAL DAILY
Qty: 90 TABLET | Refills: 1 | Status: SHIPPED | OUTPATIENT
Start: 2022-12-16 | End: 2023-06-16

## 2022-12-16 RX ORDER — ALBUTEROL SULFATE 90 UG/1
AEROSOL, METERED RESPIRATORY (INHALATION)
Qty: 8.5 G | Refills: 0 | Status: SHIPPED | OUTPATIENT
Start: 2022-12-16 | End: 2023-10-03

## 2022-12-16 ASSESSMENT — ANXIETY QUESTIONNAIRES
IF YOU CHECKED OFF ANY PROBLEMS ON THIS QUESTIONNAIRE, HOW DIFFICULT HAVE THESE PROBLEMS MADE IT FOR YOU TO DO YOUR WORK, TAKE CARE OF THINGS AT HOME, OR GET ALONG WITH OTHER PEOPLE: VERY DIFFICULT
GAD7 TOTAL SCORE: 5
1. FEELING NERVOUS, ANXIOUS, OR ON EDGE: SEVERAL DAYS
2. NOT BEING ABLE TO STOP OR CONTROL WORRYING: SEVERAL DAYS
6. BECOMING EASILY ANNOYED OR IRRITABLE: SEVERAL DAYS
4. TROUBLE RELAXING: NOT AT ALL
GAD7 TOTAL SCORE: 5
7. FEELING AFRAID AS IF SOMETHING AWFUL MIGHT HAPPEN: SEVERAL DAYS
3. WORRYING TOO MUCH ABOUT DIFFERENT THINGS: SEVERAL DAYS
5. BEING SO RESTLESS THAT IT IS HARD TO SIT STILL: NOT AT ALL

## 2022-12-16 ASSESSMENT — PAIN SCALES - GENERAL: PAINLEVEL: MODERATE PAIN (4)

## 2022-12-16 ASSESSMENT — PATIENT HEALTH QUESTIONNAIRE - PHQ9: SUM OF ALL RESPONSES TO PHQ QUESTIONS 1-9: 5

## 2022-12-16 NOTE — PATIENT INSTRUCTIONS
Assessment & Plan          Essential hypertension  - Continue plan of care  - Lipid Profile (Chol, Trig, HDL, LDL calc)  - Comprehensive metabolic panel  - TSH with free T4 reflex      ASCVD (arteriosclerotic cardiovascular disease)  - Lipid Profile (Chol, Trig, HDL, LDL calc)  - Comprehensive metabolic panel  - atorvastatin (LIPITOR) 40 MG tablet; Take 1 tablet (40 mg) by mouth daily  - nitroGLYcerin (NITROSTAT) 0.4 MG sublingual tablet; Place 1 tablet (0.4 mg) under the tongue every 5 minutes as needed for chest pain      Moderate episode of recurrent major depressive disorder (H)  - Continue plan of care      Anxiety  - Continue plan of care      Chronic fatigue  - Vitamin D level  - CBC with platelets and differential      Vitamin D deficiency  - Vitamin D level  - Vitamin D3 OTC 5000 U daily      Special screening for malignant neoplasms, colon  - Adult General Surg Referral      Encounter for screening mammogram for malignant neoplasm of breast  - Breast MRI upcoming      Personal history of malignant neoplasm of breast  - Adult General Surg Referral      ACP (advance care planning)  - Paperwork given      Chronic rhinitis  - montelukast (SINGULAIR) 10 MG tablet; Take 1 tablet (10 mg) by mouth At Bedtime  - Humidifier at home - add bacteriostatic humidifier solution      History of claustrophobia  - diazepam (VALIUM) 5 MG tablet; 1-2 po 30 minutes before MRI      Reactive airway disease   - albuterol (PROAIR HFA/PROVENTIL HFA/VENTOLIN HFA) 108 (90 Base) MCG/ACT inhaler; Inhale 2 Puffs into the lungs every four hours as needed for shortness of breath/dyspnea or wheezing.         Return in about 6 months (around 6/16/2023).        Jackie Hitchcock CNP  [facility]

## 2022-12-16 NOTE — LETTER
My Depression Action Plan  Name: Vandana Jaeger   Date of Birth 1960  Date: 12/16/2022    My doctor: Jackie Hitchcock   My clinic: [facility]  [address]  [place] [address]  [phone]          GREEN    ZONE   Good Control    What it looks like:     Things are going generally well. You have normal ups and downs. You may even feel depressed from time to time, but bad moods usually last less than a day.   What you need to do:  1. Continue to care for yourself (see self care plan)  2. Check your depression survival kit and update it as needed  3. Follow your physician s recommendations including any medication.  4. Do not stop taking medication unless you consult with your physician first.           YELLOW         ZONE Getting Worse    What it looks like:     Depression is starting to interfere with your life.     It may be hard to get out of bed; you may be starting to isolate yourself from others.    Symptoms of depression are starting to last most all day and this has happened for several days.     You may have suicidal thoughts but they are not constant.   What you need to do:     1. Call your care team. Your response to treatment will improve if you keep your care team informed of your progress. Yellow periods are signs an adjustment may need to be made.     2. Continue your self-care.  Just get dressed and ready for the day.  Don't give yourself time to talk yourself out of it.    3. Talk to someone in your support network.    4. Open up your Depression Self-Care Plan/Wellness Kit.           RED    ZONE Medical Alert - Get Help    What it looks like:     Depression is seriously interfering with your life.     You may experience these or other symptoms: You can t get out of bed most days, can t work or engage in other necessary activities, you have trouble taking care of basic hygiene, or basic responsibilities, thoughts of suicide or death that will not go  away, self-injurious behavior.     What you need to do:  1. Call your care team and request a same-day appointment. If they are not available (weekends or after hours) call your local crisis line, emergency room or 911.          Depression Self-Care Plan / Wellness Kit    Many people find that medication and therapy are helpful treatments for managing depression. In addition, making small changes to your everyday life can help to boost your mood and improve your wellbeing. Below are some tips for you to consider. Be sure to talk with your medical provider and/or behavioral health consultant if your symptoms are worsening or not improving.     Sleep   Sleep hygiene  means all of the habits that support good, restful sleep. It includes maintaining a consistent bedtime and wake time, using your bedroom only for sleeping or sex, and keeping the bedroom dark and free of distractions like a computer, smartphone, or television.     Develop a Healthy Routine  Maintain good hygiene. Get out of bed in the morning, make your bed, brush your teeth, take a shower, and get dressed. Don t spend too much time viewing media that makes you feel stressed. Find time to relax each day.    Exercise  Get some form of exercise every day. This will help reduce pain and release endorphins, the  feel good  chemicals in your brain. It can be as simple as just going for a walk or doing some gardening, anything that will get you moving.      Diet  Strive to eat healthy foods, including fruits and vegetables. Drink plenty of water. Avoid excessive sugar, caffeine, alcohol, and other mood-altering substances.     Stay Connected with Others  Stay in touch with friends and family members.    Manage Your Mood  Try deep breathing, massage therapy, biofeedback, or meditation. Take part in fun activities when you can. Try to find something to smile about each day.     Psychotherapy  Be open to working with a therapist if your provider recommends it.      Medication  Be sure to take your medication as prescribed. Most anti-depressants need to be taken every day. It usually takes several weeks for medications to work. Not all medicines work for all people. It is important to follow-up with your provider to make sure you have a treatment plan that is working for you. Do not stop your medication abruptly without first discussing it with your provider.    Crisis Resources   These hotlines are for both adults and children. They and are open 24 hours a day, 7 days a week unless noted otherwise.      National Suicide Prevention Lifeline   988 or 0-238-375-ZJPL (1899)      Crisis Text Line    www.crisistextline.org  Text HOME to 043338 from anywhere in the [place], anytime, about any type of crisis. A live, trained crisis counselor will receive the text and respond quickly.      [employer] Lifeline for LGBTQ Youth  A national crisis intervention and suicide lifeline for LGBTQ youth under 25. Provides a safe place to talk without judgement. Call 1-840.259.2753; text START to 033883 or visit www.thetrevorproject.org to talk to a trained counselor.      For [facility] crisis numbers, visit the [facility] website at:  https://[place].gov/dhs/people-we-serve/adults/health-care/mental-health/resources/crisis-contacts.jsp

## 2022-12-18 LAB — DEPRECATED CALCIDIOL+CALCIFEROL SERPL-MC: 42 UG/L (ref 20–75)

## 2022-12-19 ENCOUNTER — TELEPHONE (OUTPATIENT)
Dept: MAMMOGRAPHY | Facility: OTHER | Age: 62
End: 2022-12-19

## 2022-12-19 NOTE — TELEPHONE ENCOUNTER
Reviewed MRI paperwork when patient called back.   ---------------  Left message with patient to review MRI paperwork and see if there are any updates from July. Malina Granados

## 2022-12-22 ENCOUNTER — TELEPHONE (OUTPATIENT)
Dept: ONCOLOGY | Facility: OTHER | Age: 62
End: 2022-12-22

## 2022-12-22 NOTE — TELEPHONE ENCOUNTER
Vandana came on 12-22-22 for her MRI Bilateral Breast.  We were unable to do the MRI on her as the scanner was too tight for her to tolerate the exam.  She had taken 2 valium and we removed all of the cushions on the coil to make more room for her but she said she just could not do it.  It was a very tight fit and she was unable to take a deep breath when we were trying to slide her in so she said she could just not do it.

## 2022-12-30 ENCOUNTER — TRANSFERRED RECORDS (OUTPATIENT)
Dept: HEALTH INFORMATION MANAGEMENT | Facility: CLINIC | Age: 62
End: 2022-12-30

## 2023-02-14 DIAGNOSIS — I10 ESSENTIAL HYPERTENSION: ICD-10-CM

## 2023-02-14 DIAGNOSIS — I10 BENIGN ESSENTIAL HYPERTENSION: ICD-10-CM

## 2023-02-15 RX ORDER — LISINOPRIL 40 MG/1
TABLET ORAL
Qty: 90 TABLET | Refills: 0 | Status: SHIPPED | OUTPATIENT
Start: 2023-02-15 | End: 2023-05-12

## 2023-02-15 RX ORDER — AMLODIPINE BESYLATE 5 MG/1
TABLET ORAL
Qty: 90 TABLET | Refills: 0 | Status: SHIPPED | OUTPATIENT
Start: 2023-02-15 | End: 2023-05-12

## 2023-02-15 NOTE — TELEPHONE ENCOUNTER
norvasc      Last Written Prescription Date:  11/18/22  Last Fill Quantity: 90,   # refills: 0  Last Office Visit: 12/16/22  Future Office visit:       lisinopril      Last Written Prescription Date:  11/18/22  Last Fill Quantity: 90,   # refills: 0  Last Office Visit: 12/16/22  Future Office visit:

## 2023-05-12 DIAGNOSIS — F33.0 MILD EPISODE OF RECURRENT MAJOR DEPRESSIVE DISORDER (H): ICD-10-CM

## 2023-05-12 DIAGNOSIS — I10 ESSENTIAL HYPERTENSION: ICD-10-CM

## 2023-05-12 DIAGNOSIS — I10 BENIGN ESSENTIAL HYPERTENSION: ICD-10-CM

## 2023-05-12 RX ORDER — LISINOPRIL 40 MG/1
TABLET ORAL
Qty: 90 TABLET | Refills: 0 | Status: SHIPPED | OUTPATIENT
Start: 2023-05-12 | End: 2023-06-16

## 2023-05-12 RX ORDER — AMLODIPINE BESYLATE 5 MG/1
TABLET ORAL
Qty: 90 TABLET | Refills: 0 | Status: SHIPPED | OUTPATIENT
Start: 2023-05-12 | End: 2023-06-16

## 2023-05-15 RX ORDER — ESCITALOPRAM OXALATE 10 MG/1
TABLET ORAL
Qty: 60 TABLET | Refills: 0 | Status: SHIPPED | OUTPATIENT
Start: 2023-05-15 | End: 2023-06-16

## 2023-05-15 RX ORDER — ESCITALOPRAM OXALATE 20 MG/1
TABLET ORAL
Qty: 60 TABLET | Refills: 0 | Status: SHIPPED | OUTPATIENT
Start: 2023-05-15 | End: 2023-06-16

## 2023-05-15 NOTE — TELEPHONE ENCOUNTER
Lexapro, Lexapro      Last Written Prescription Date:  2.8.23, 2.18.23  Last Fill Quantity: #90, #90,   # refills: 0  Last Office Visit: 12.16.22  Future Office visit:    Next 5 appointments (look out 90 days)    Jun 20, 2023 10:30 AM  (Arrive by 10:15 AM)  SHORT with Jackie Hitchcock CNP  [facility] ([facility] [address]  [facility] [address]  [phone]           Routing refill request to provider for review/approval because:

## 2023-06-15 DIAGNOSIS — I10 BENIGN ESSENTIAL HYPERTENSION: ICD-10-CM

## 2023-06-15 DIAGNOSIS — I25.10 ASCVD (ARTERIOSCLEROTIC CARDIOVASCULAR DISEASE): ICD-10-CM

## 2023-06-15 DIAGNOSIS — F33.0 MILD EPISODE OF RECURRENT MAJOR DEPRESSIVE DISORDER (H): ICD-10-CM

## 2023-06-15 DIAGNOSIS — I10 ESSENTIAL HYPERTENSION: ICD-10-CM

## 2023-06-15 NOTE — TELEPHONE ENCOUNTER
Amlodipine (Norvasc) 5 MG tablet    Last Written Prescription Date:  05/12/2023  Last Fill Quantity: 90,   # refills: 0  Last Office Visit: 12/06/2022        Atorvastatin (Lipitor) 40 MG tablet    Last Written Prescription Date:  12/16/2022  Last Fill Quantity: 90,   # refills: 1      Lisinopril (Zestril) 40 MG tablet    Last Written Prescription Date:  05/12/2023  Last Fill Quantity: 90,   # refills: 0

## 2023-06-16 RX ORDER — ESCITALOPRAM OXALATE 20 MG/1
TABLET ORAL
Qty: 60 TABLET | Refills: 0 | Status: SHIPPED | OUTPATIENT
Start: 2023-06-16 | End: 2023-08-11

## 2023-06-16 RX ORDER — ATORVASTATIN CALCIUM 40 MG/1
TABLET, FILM COATED ORAL
Qty: 90 TABLET | Refills: 1 | Status: SHIPPED | OUTPATIENT
Start: 2023-06-16 | End: 2024-04-19

## 2023-06-16 RX ORDER — ESCITALOPRAM OXALATE 10 MG/1
TABLET ORAL
Qty: 60 TABLET | Refills: 0 | Status: SHIPPED | OUTPATIENT
Start: 2023-06-16 | End: 2023-08-11

## 2023-06-16 RX ORDER — LISINOPRIL 40 MG/1
TABLET ORAL
Qty: 90 TABLET | Refills: 1 | Status: SHIPPED | OUTPATIENT
Start: 2023-06-16 | End: 2024-01-19

## 2023-06-16 RX ORDER — AMLODIPINE BESYLATE 5 MG/1
TABLET ORAL
Qty: 90 TABLET | Refills: 1 | Status: SHIPPED | OUTPATIENT
Start: 2023-06-16 | End: 2023-08-11

## 2023-06-16 NOTE — TELEPHONE ENCOUNTER
Lexapro      Last Written Prescription Date:  5.16.23  Last Fill Quantity: #60, #60,   # refills: 0  Last Office Visit: 12.16.22  Future Office visit:    Next 5 appointments (look out 90 days)    Jun 20, 2023 10:30 AM  (Arrive by 10:15 AM)  SHORT with Jackie Hitchcock CNP  [facility] ([facility] ) [address]  [place] [address]  [phone]           Routing refill request to provider for review/approval because:

## 2023-06-28 ENCOUNTER — TELEPHONE (OUTPATIENT)
Dept: ONCOLOGY | Facility: OTHER | Age: 63
End: 2023-06-28

## 2023-06-28 ENCOUNTER — OFFICE VISIT (OUTPATIENT)
Dept: FAMILY MEDICINE | Facility: OTHER | Age: 63
End: 2023-06-28
Attending: NURSE PRACTITIONER
Payer: COMMERCIAL

## 2023-06-28 VITALS
WEIGHT: 293 LBS | TEMPERATURE: 98.4 F | BODY MASS INDEX: 48.42 KG/M2 | HEART RATE: 96 BPM | DIASTOLIC BLOOD PRESSURE: 78 MMHG | SYSTOLIC BLOOD PRESSURE: 132 MMHG | OXYGEN SATURATION: 96 % | RESPIRATION RATE: 22 BRPM

## 2023-06-28 DIAGNOSIS — F41.9 ANXIETY: ICD-10-CM

## 2023-06-28 DIAGNOSIS — I25.10 ASCVD (ARTERIOSCLEROTIC CARDIOVASCULAR DISEASE): ICD-10-CM

## 2023-06-28 DIAGNOSIS — F33.1 MODERATE EPISODE OF RECURRENT MAJOR DEPRESSIVE DISORDER (H): ICD-10-CM

## 2023-06-28 DIAGNOSIS — R73.03 PREDIABETES: ICD-10-CM

## 2023-06-28 DIAGNOSIS — I10 ESSENTIAL HYPERTENSION: Primary | ICD-10-CM

## 2023-06-28 DIAGNOSIS — Z13.220 SCREENING FOR HYPERLIPIDEMIA: ICD-10-CM

## 2023-06-28 DIAGNOSIS — Z12.31 ENCOUNTER FOR SCREENING MAMMOGRAM FOR MALIGNANT NEOPLASM OF BREAST: ICD-10-CM

## 2023-06-28 DIAGNOSIS — Z85.3 PERSONAL HISTORY OF MALIGNANT NEOPLASM OF BREAST: ICD-10-CM

## 2023-06-28 PROBLEM — H25.13 AGE-RELATED NUCLEAR CATARACT, BILATERAL: Status: ACTIVE | Noted: 2021-12-14

## 2023-06-28 PROBLEM — H47.231: Status: ACTIVE | Noted: 2021-12-14

## 2023-06-28 LAB
ALBUMIN SERPL BCG-MCNC: 4.5 G/DL (ref 3.5–5.2)
ALBUMIN UR-MCNC: NEGATIVE MG/DL
ALP SERPL-CCNC: 67 U/L (ref 35–104)
ALT SERPL W P-5'-P-CCNC: 38 U/L (ref 0–50)
ANION GAP SERPL CALCULATED.3IONS-SCNC: 17 MMOL/L (ref 7–15)
APPEARANCE UR: CLEAR
AST SERPL W P-5'-P-CCNC: 29 U/L (ref 0–45)
BILIRUB SERPL-MCNC: 0.5 MG/DL
BILIRUB UR QL STRIP: NEGATIVE
BUN SERPL-MCNC: 14.9 MG/DL (ref 8–23)
CALCIUM SERPL-MCNC: 10.1 MG/DL (ref 8.8–10.2)
CHLORIDE SERPL-SCNC: 97 MMOL/L (ref 98–107)
CHOLEST SERPL-MCNC: 139 MG/DL
COLOR UR AUTO: YELLOW
CREAT SERPL-MCNC: 0.63 MG/DL (ref 0.51–0.95)
DEPRECATED HCO3 PLAS-SCNC: 22 MMOL/L (ref 22–29)
EST. AVERAGE GLUCOSE BLD GHB EST-MCNC: 111 MG/DL
GFR SERPL CREATININE-BSD FRML MDRD: >90 ML/MIN/1.73M2
GLUCOSE SERPL-MCNC: 114 MG/DL (ref 70–99)
GLUCOSE UR STRIP-MCNC: NEGATIVE MG/DL
HBA1C MFR BLD: 5.5 %
HDLC SERPL-MCNC: 50 MG/DL
HGB UR QL STRIP: NEGATIVE
HOLD SPECIMEN: NORMAL
KETONES UR STRIP-MCNC: NEGATIVE MG/DL
LDLC SERPL CALC-MCNC: 53 MG/DL
LEUKOCYTE ESTERASE UR QL STRIP: NEGATIVE
NITRATE UR QL: NEGATIVE
NONHDLC SERPL-MCNC: 89 MG/DL
PH UR STRIP: 5.5 [PH] (ref 5–7)
POTASSIUM SERPL-SCNC: 4 MMOL/L (ref 3.4–5.3)
PROT SERPL-MCNC: 8.1 G/DL (ref 6.4–8.3)
SODIUM SERPL-SCNC: 136 MMOL/L (ref 136–145)
SP GR UR STRIP: <=1.005 (ref 1–1.03)
TRIGL SERPL-MCNC: 178 MG/DL
TSH SERPL DL<=0.005 MIU/L-ACNC: 1.39 UIU/ML (ref 0.3–4.2)
UROBILINOGEN UR STRIP-ACNC: 0.2 E.U./DL

## 2023-06-28 PROCEDURE — 84443 ASSAY THYROID STIM HORMONE: CPT | Performed by: NURSE PRACTITIONER

## 2023-06-28 PROCEDURE — 36415 COLL VENOUS BLD VENIPUNCTURE: CPT | Performed by: NURSE PRACTITIONER

## 2023-06-28 PROCEDURE — 83036 HEMOGLOBIN GLYCOSYLATED A1C: CPT | Performed by: NURSE PRACTITIONER

## 2023-06-28 PROCEDURE — 80061 LIPID PANEL: CPT | Performed by: NURSE PRACTITIONER

## 2023-06-28 PROCEDURE — 81003 URINALYSIS AUTO W/O SCOPE: CPT | Performed by: NURSE PRACTITIONER

## 2023-06-28 PROCEDURE — 80053 COMPREHEN METABOLIC PANEL: CPT | Performed by: NURSE PRACTITIONER

## 2023-06-28 PROCEDURE — 99215 OFFICE O/P EST HI 40 MIN: CPT | Performed by: NURSE PRACTITIONER

## 2023-06-28 RX ORDER — FUROSEMIDE 40 MG
80 TABLET ORAL 2 TIMES DAILY
COMMUNITY
Start: 2023-06-26 | End: 2024-02-06

## 2023-06-28 ASSESSMENT — ANXIETY QUESTIONNAIRES
3. WORRYING TOO MUCH ABOUT DIFFERENT THINGS: SEVERAL DAYS
2. NOT BEING ABLE TO STOP OR CONTROL WORRYING: SEVERAL DAYS
4. TROUBLE RELAXING: NOT AT ALL
7. FEELING AFRAID AS IF SOMETHING AWFUL MIGHT HAPPEN: SEVERAL DAYS
GAD7 TOTAL SCORE: 5
1. FEELING NERVOUS, ANXIOUS, OR ON EDGE: SEVERAL DAYS
GAD7 TOTAL SCORE: 5
8. IF YOU CHECKED OFF ANY PROBLEMS, HOW DIFFICULT HAVE THESE MADE IT FOR YOU TO DO YOUR WORK, TAKE CARE OF THINGS AT HOME, OR GET ALONG WITH OTHER PEOPLE?: NOT DIFFICULT AT ALL
5. BEING SO RESTLESS THAT IT IS HARD TO SIT STILL: NOT AT ALL
6. BECOMING EASILY ANNOYED OR IRRITABLE: SEVERAL DAYS
7. FEELING AFRAID AS IF SOMETHING AWFUL MIGHT HAPPEN: SEVERAL DAYS
IF YOU CHECKED OFF ANY PROBLEMS ON THIS QUESTIONNAIRE, HOW DIFFICULT HAVE THESE PROBLEMS MADE IT FOR YOU TO DO YOUR WORK, TAKE CARE OF THINGS AT HOME, OR GET ALONG WITH OTHER PEOPLE: NOT DIFFICULT AT ALL
GAD7 TOTAL SCORE: 5

## 2023-06-28 ASSESSMENT — PATIENT HEALTH QUESTIONNAIRE - PHQ9
SUM OF ALL RESPONSES TO PHQ QUESTIONS 1-9: 3
10. IF YOU CHECKED OFF ANY PROBLEMS, HOW DIFFICULT HAVE THESE PROBLEMS MADE IT FOR YOU TO DO YOUR WORK, TAKE CARE OF THINGS AT HOME, OR GET ALONG WITH OTHER PEOPLE: NOT DIFFICULT AT ALL
SUM OF ALL RESPONSES TO PHQ QUESTIONS 1-9: 3

## 2023-06-28 NOTE — PATIENT INSTRUCTIONS
Assessment & Plan       Essential hypertension  - Comprehensive metabolic panel  - Lipid Profile (Chol, Trig, HDL, LDL calc)  - TSH with free T4 reflex  - UA Macroscopic with reflex to Microscopic and Culture      Screening for hyperlipidemia  - Lipid Profile (Chol, Trig, HDL, LDL calc)      Moderate episode of recurrent major depressive disorder (H)  - Continue plan of care      Anxiety  - Continue plan of care      Personal history of malignant neoplasm of breast  - MA Screen Bilateral w/Sen; Future      Encounter for screening mammogram for malignant neoplasm of breast  - MA Screen Bilateral w/Sen; Future      ASCVD (arteriosclerotic cardiovascular disease)  - Follow-up with Cardiology      Prediabetes  - Hemoglobin A1c              Return in about 6 months (around 12/28/2023).        Jackie Hitchcock CNP  [facility] [place] [facility]

## 2023-06-28 NOTE — RESULT ENCOUNTER NOTE
All labs are stable    [facility]  [phone]

## 2023-06-28 NOTE — PROGRESS NOTES
Assessment & Plan       Essential hypertension  - Comprehensive metabolic panel  - Lipid Profile (Chol, Trig, HDL, LDL calc)  - TSH with free T4 reflex  - UA Macroscopic with reflex to Microscopic and Culture      Screening for hyperlipidemia  - Lipid Profile (Chol, Trig, HDL, LDL calc)      Moderate episode of recurrent major depressive disorder (H)  - Continue plan of care      Anxiety  - Continue plan of care      Personal history of malignant neoplasm of breast  - MA Screen Bilateral w/Sen; Future      Encounter for screening mammogram for malignant neoplasm of breast  - MA Screen Bilateral w/Sen; Future      ASCVD (arteriosclerotic cardiovascular disease)  - Follow-up with Cardiology      Prediabetes  - Hemoglobin A1c        40  minutes spent by me on the date of the encounter doing chart review, review of outside records, review of test results, interpretation of tests, patient visit, documentation and discussion with other provider(s)            Return in about 6 months (around 12/28/2023).        Jackie Hitchcock, CNP  [facility] - [facility] Ross is a 63 year old, presenting for the following health issues:  Chronic Disease Management          Hypertension Follow-up    Do you check your blood pressure regularly outside of the clinic? No     Are you following a low salt diet? No    Are your blood pressures ever more than 140 on the top number (systolic) OR more   than 90 on the bottom number (diastolic), for example 140/90? Yes      Depression and Anxiety Follow-Up    How are you doing with your depression since your last visit? Improved     How are you doing with your anxiety since your last visit?  No change    Are you having other symptoms that might be associated with depression or anxiety? No    Have you had a significant life event? OTHER: retired     Do you have any concerns with your use of alcohol or other drugs? No        Social History     Tobacco Use     Smoking status:  Former     Packs/day: 0.10     Years: 30.00     Pack years: 3.00     Types: Cigarettes     Start date: 8/15/1974     Quit date: 3/3/2013     Years since quitting: 10.3     Smokeless tobacco: Never   Vaping Use     Vaping Use: Never used   Substance Use Topics     Alcohol use: Yes     Drug use: No             10/13/2021    10:00 AM 12/16/2022    10:13 AM 6/28/2023    11:18 AM   PHQ   PHQ-9 Total Score 6 5 3   Q9: Thoughts of better off dead/self-harm past 2 weeks Not at all Not at all Not at all              10/13/2021    10:00 AM 12/16/2022    10:14 AM 6/28/2023    11:20 AM   REUBEN-7 SCORE   Total Score   5 (mild anxiety)   Total Score 2 5 5              6/28/2023    11:18 AM   Last PHQ-9   1.  Little interest or pleasure in doing things 1   2.  Feeling down, depressed, or hopeless 1   3.  Trouble falling or staying asleep, or sleeping too much 0   4.  Feeling tired or having little energy 1   5.  Poor appetite or overeating 0   6.  Feeling bad about yourself 0   7.  Trouble concentrating 0   8.  Moving slowly or restless 0   Q9: Thoughts of better off dead/self-harm past 2 weeks 0   PHQ-9 Total Score 3              6/28/2023    11:20 AM   REUBEN-7    1. Feeling nervous, anxious, or on edge 1   2. Not being able to stop or control worrying 1   3. Worrying too much about different things 1   4. Trouble relaxing 0   5. Being so restless that it is hard to sit still 0   6. Becoming easily annoyed or irritable 1   7. Feeling afraid, as if something awful might happen 1   REUBEN-7 Total Score 5   If you checked any problems, how difficult have they made it for you to do your work, take care of things at home, or get along with other people? Not difficult at all           Coronary Vascular Disease   Follows at [facility]    How often do you take nitroglycerin? Never    Do you take an aspirin every day? 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     Ductal carcinoma in situ (DCIS) of right breast     Anxiety     Reactive airway disease that is not asthma     Contact dermatitis and other eczema, due to unspecified cause     Essential hypertension     Vitamin D deficiency     ACP (advance care planning)     Use of tamoxifen (Nolvadex)     Morbid obesity with body mass index (BMI) of 40.0 to 44.9 in adult (H)     Recurrent major depressive disorder (H)     History of COVID-19     Urinary, incontinence, stress female     ASCVD (arteriosclerotic cardiovascular disease)     Hx of heart artery stent     Chondromalacia of patella     Disorder of refraction and accommodation     Medial meniscus tear     Impaired gait and mobility     Impairment of balance     Right knee pain     Chronic fatigue     Age-related nuclear cataract, bilateral     Cupping of optic disc, right     Lesion of left upper eyelid     Prediabetes     Past Surgical History:   Procedure Laterality Date     BIOPSY      right breast--neg     BIOPSY BREAST Right 02/13/2014    DCIS, LOBULAR CARCINOMA     BIOPSY BREAST NEEDLE LOCALIZATION  2/13/2014    Procedure: BIOPSY BREAST NEEDLE LOCALIZATION;  WIRE LOCALIZED SEGMENTAL RESECTION, RIGHT BREAST;  Surgeon: Shanika Beaver MD;  Location: [facility]     CARDIAC SURGERY      stent     CHOLECYSTECTOMY  2010     COLONOSCOPY  2009     COLONOSCOPY  11/12/2013     DILATION AND CURETTAGE, OPERATIVE HYSTEROSCOPY, COMBINED  03/2017    [facility]     LUMPECTOMY BREAST Right 02/13/2014    DCIS, LOBULAR CARCINOMA     ORTHOPEDIC SURGERY      heel surgery     ORTHOPEDIC SURGERY      rt knee meniiscus tear       Social History     Tobacco Use     Smoking status: Former     Packs/day: 0.10     Years: 30.00     Pack years: 3.00     Types: Cigarettes     Start date: 8/15/1974     Quit date: 3/3/2013     Years since quitting: 10.3      Smokeless tobacco: Never   Substance Use Topics     Alcohol use: Yes     Family History   Problem Relation Age of Onset     C.A.D. Father      Coronary Artery Disease Father      Cerebrovascular Disease Father      Prostate Cancer Father      Hypertension Mother      Cerebrovascular Disease Mother      Breast Cancer Mother 55         age 71 left mastectomy     Thyroid Disease Other      Thyroid Disease Sister      Diabetes No family hx of      Asthma No family hx of      Hyperlipidemia No family hx of      Colon Cancer No family hx of      Other Cancer No family hx of      Anesthesia Reaction No family hx of      Genetic Disorder No family hx of                Current Outpatient Medications   Medication Sig Dispense Refill     acetaminophen (TYLENOL) 650 MG CR tablet Take 650 mg by mouth every 8 hours as needed for mild pain or fever       albuterol (PROAIR HFA/PROVENTIL HFA/VENTOLIN HFA) 108 (90 Base) MCG/ACT inhaler Inhale 2 Puffs into the lungs every four hours as needed for shortness of breath/dyspnea or wheezing. 8.5 g 0     amLODIPine (NORVASC) 5 MG tablet Take 1 Tablet by mouth one time a day. 90 tablet 1     aspirin (ASA) 81 MG EC tablet Take 1 tablet (81 mg) by mouth daily 90 tablet 11     atorvastatin (LIPITOR) 40 MG tablet Take 1 Tablet by mouth one time a day. 90 tablet 1     calcium-vitamin D (CALTRATE) 600-400 MG-UNIT per tablet Take 1 tablet by mouth 2 times daily       clopidogrel (PLAVIX) 75 MG tablet Take 1 tablet (75 mg) by mouth daily 90 tablet 1     diazepam (VALIUM) 5 MG tablet 1-2 po 30 minutes before MRI 2 tablet 0     doxylamine (UNISOM) 25 MG TABS tablet Take 25 mg by mouth nightly as needed 1-2 at HS as needed       escitalopram (LEXAPRO) 10 MG tablet Take 1 Tablet by mouth one time a day. Take along with 20mg tablet for a total dose of 30mg. 60 tablet 0     escitalopram (LEXAPRO) 20 MG tablet Take 1 Tablet by mouth one time a day. Take along with 10 mg tablet for a total dose of 30  mg. 60 tablet 0     furosemide (LASIX) 40 MG tablet        lisinopril (ZESTRIL) 40 MG tablet Take 1 Tablet by mouth one time a day. 90 tablet 1     montelukast (SINGULAIR) 10 MG tablet Take 1 tablet (10 mg) by mouth At Bedtime 90 tablet 3     Multiple Vitamin (DAILY MULTIVITAMIN PO) Take one tablet by oral route every day with food       nitroGLYcerin (NITROSTAT) 0.4 MG sublingual tablet Place 1 tablet (0.4 mg) under the tongue every 5 minutes as needed for chest pain 8 tablet 0     nystatin (MYCOSTATIN) 635622 UNIT/GM external powder Apply 100,000 g topically 2 times daily as needed (Patient not taking: Reported on 6/7/2022) 60 g 3     Omega-3 Fatty Acids (OMEGA 3 PO) Take 1,000 mg by mouth daily        vitamin B-12 (CYANOCOBALAMIN) 1000 MCG tablet Take 1,000 mcg by mouth daily 30 tablet      VITAMIN D, CHOLECALCIFEROL, PO Take 5,000 Units by mouth daily            Allergies   Allergen Reactions     Adhesive Tape      Steri strips left scar.     Latex      Possible allergy.         Recent Labs   Lab Test 12/16/22  1031 10/05/21  1019 07/14/21  1511 07/14/21  1511 04/06/21  1013 02/03/21  0000 01/11/21  1617 06/16/18  0000 02/20/18  1107   LDL 52  --   --   --  90  --   --   --  99   HDL 54  --   --   --  59  --   --   --  50   TRIG 125  --   --   --  123  --   --   --  113   ALT 28 32  --  35 47  --  35   < >  --    CR 0.56 0.70   < > 0.81 0.58   < > 0.61   < >  --    GFRESTIMATED >90 >90   < > 78 >90   < > >90   < >  --    GFRESTBLACK  --   --   --   --  >90  --  >90   < >  --    POTASSIUM 4.3 4.4   < > 3.9 3.7   < > 3.7   < >  --    TSH 1.27  --   --   --  0.87  --  2.90  --  0.95    < > = values in this interval not displayed.          BP Readings from Last 3 Encounters:   06/28/23 132/78   12/16/22 134/80   06/07/22 130/74    Wt Readings from Last 3 Encounters:   06/28/23 136.1 kg (300 lb)   12/16/22 134.7 kg (297 lb)   06/07/22 135.5 kg (298 lb 11.6 oz)                   Review of Systems   Constitutional,  HEENT, cardiovascular, pulmonary, GI, , musculoskeletal, neuro, skin, endocrine and psych systems are negative, except as otherwise noted.          Objective    /78 (BP Location: Left arm, Patient Position: Sitting, Cuff Size: Adult Large)   Pulse 96   Temp 98.4  F (36.9  C) (Tympanic)   Resp 22   Wt 136.1 kg (300 lb)   SpO2 96%   BMI 48.42 kg/m    Body mass index is 48.42 kg/m .           Physical Exam   GENERAL: healthy, alert and no distress  EYES: Eyes grossly normal to inspection, PERRL and conjunctivae and sclerae normal  NECK: no adenopathy, no asymmetry, masses, or scars and thyroid normal to palpation  RESP: lungs clear to auscultation - no rales, rhonchi or wheezes  CV: regular rate and rhythm, normal S1 S2, no S3 or S4, no murmur, click or rub, no peripheral edema and peripheral pulses strong  MS: no gross musculoskeletal defects noted, no edema  SKIN: no suspicious lesions or rashes  PSYCH: mentation appears normal, affect normal/bright

## 2023-06-28 NOTE — TELEPHONE ENCOUNTER
Note received from her PCP that she is overdue for follow-up.  Mammogram was ordered by her PCP.  Please call patient to see if she would get in to see me for follow-up and plan for future surveillance for breast cancer.

## 2023-06-29 NOTE — TELEPHONE ENCOUNTER
I spoke with patient and she would like to see Birdie. Scheduled for July 25 in [facility]. Did you want any labs ? She had labs yesterday for A Flaim and her mammogram is scheduled for 8/18/2023

## 2023-07-25 ENCOUNTER — PATIENT OUTREACH (OUTPATIENT)
Dept: ONCOLOGY | Facility: CLINIC | Age: 63
End: 2023-07-25

## 2023-07-25 ENCOUNTER — ONCOLOGY VISIT (OUTPATIENT)
Dept: ONCOLOGY | Facility: OTHER | Age: 63
End: 2023-07-25
Attending: NURSE PRACTITIONER
Payer: COMMERCIAL

## 2023-07-25 VITALS
BODY MASS INDEX: 47.09 KG/M2 | RESPIRATION RATE: 20 BRPM | TEMPERATURE: 98 F | SYSTOLIC BLOOD PRESSURE: 132 MMHG | DIASTOLIC BLOOD PRESSURE: 80 MMHG | OXYGEN SATURATION: 97 % | HEIGHT: 66 IN | WEIGHT: 293 LBS | HEART RATE: 107 BPM

## 2023-07-25 DIAGNOSIS — Z86.000 HISTORY OF DUCTAL CARCINOMA IN SITU (DCIS) OF BREAST: Primary | ICD-10-CM

## 2023-07-25 DIAGNOSIS — Z91.89 AT HIGH RISK FOR BREAST CANCER: ICD-10-CM

## 2023-07-25 DIAGNOSIS — D05.01 LOBULAR CARCINOMA IN SITU OF RIGHT BREAST: ICD-10-CM

## 2023-07-25 DIAGNOSIS — Z80.9 FAMILY HISTORY OF CANCER: ICD-10-CM

## 2023-07-25 PROCEDURE — 99214 OFFICE O/P EST MOD 30 MIN: CPT | Performed by: NURSE PRACTITIONER

## 2023-07-25 ASSESSMENT — PAIN SCALES - GENERAL: PAINLEVEL: NO PAIN (0)

## 2023-07-25 NOTE — PROGRESS NOTES
Writer received referral to Cancer Risk Management/Genetic Counseling.    Referred for: Personal history of DCIS/LCIS and family history of breast cancer      Referral reviewed for appropriate plan, and sent to New Patient Scheduling for completion.    Christa Larsen, RN, BSN  Oncology New Patient Nurse Navigator   [facility]  [phone]

## 2023-07-25 NOTE — PROGRESS NOTES
High Risk Breast Cancer Visit:   July 25, 2023    Reason for Visit:  Patient presents with:  Oncology Clinic Visit: Follow up DCIS     Nursing Note and documentation reviewed: yes    HPI:   This is a 63-year-old female patient who presents to the Oncology clinic today in follow-up of high risk breast cancer.   Last follow-up was completed in October 2021.  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  She was placed on tamoxifen 20 mg daily in March 2014 and completed 5 years of therapy discontinuing this in April 2019.  Plan is to go forth with breast cancer surveillance alternating mammogram with every 6 months     Interim HX:     She presents to the clinic today stating she is doing really well.  Last mammogram was completed December 2021 with plan for another to be completed December 2022 and she was unable to follow through with this.  Plan was to try to arrange at an open scanner and this did not occur.      She retired back in January and is enjoying this.  She does have ongoing fatigue but feels that this is improved since retiring.  She also has ongoing anxiety but again this has improved.  She did have 2 stents placed and she does follow with cardiology.  She recently saw her PCP and mammogram is scheduled for August.  She has yet to follow through with the genetics visit and thinks someone did call her in the past about this.  She denies any changes to the breast tissue including any new lumps or skin changes.  She is frustrated that she has not lost any weight and states she does feel she is very healthy.  Has no specific exercise program but does try to keep very active around her yard.    Oncologic History:   Vandana had undergone an abnormal mammogram in November 2013 which showed microcalcifications suspicious for malignancy. She subsequently underwent stereotactic biopsy on November 21, 2013 with original  interpretation of histopathology being that of hyperplasia without atypia.  This was then over read as part of routine protocol in 2014.  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  She was evaluated by Dr. Shanika Beaver in Gen. Surgery on 2014 and bilateral MRI of the breast was obtained which revealed oval-shaped nodular densities in both breasts consistent with inframammary lymph nodes.      The patient was seen by Dr. Chávez of radiation oncology on 2014 and his impression was that the patient had DCIS and would require definitive surgery.     The patient underwent excisional breast biopsy on 2014 and pathology showed LCIS, multifocal, completely excised and low-grade, grade 1, DCIS  which was completely excised.  Tumor size was at least 0.5 cm DCIS grade 1; margins were uninvolved by DCIS.  Estrogen receptor was positive at 90% and progesterone receptor was positive at 50%.  Patient was staged pathologic Tis associated with LCIS.  She was started on tamoxifen.     Previous TX:  Tamoxifen 20 mg daily started 2014 and completed 3/2019    Family History: No changes to family history; Father prostate cancer-unsure of age (thinks 50's) at diagnoses and  at 81 of GB; mom breast cancer in 50's and  at 71 of lung issue     Risk Assessment:       HRT:  none  Previous Breast Biopsies: 2013 pathology showed hyperplasia without atypia and reread in 2014 as DCIS with LCIS  Exercise: no special exercise program but is active  Diet:  states healthy  Alcohol:  Yes-not daily-may have 2 in evening 4-5 days a week  Smoking: none  Age of 1st menses:  13  Age of first pregnancy:  16 miscarraged and 27 for second pregnancy  H/o Breastfeeding:  3 months  LMP:  In      Genetic Counseling:   Referral was sent     Genetic Testing:  None  .  Past Medical History:   Diagnosis Date    Anxiety state, unspecified     Arthritis     ASCVD  (arteriosclerotic cardiovascular disease) 04/05/2021    Benign essential hypertension 11/24/2015    Breast cancer ([facility]) 02/13/2014    DCIS, LOBULAR CARCINOMA  RIGHT BREAST    Contact dermatitis and other eczema, due to unspecified cause     Coronary artery disease     Depressive disorder     Ductal carcinoma in situ of right breast 01/28/2014    Ductal carcinoma in situ of right breast 01/28/2014    H/O herpes zoster     History of COVID-19 01/11/2021    Hx of heart artery stent 04/06/2021    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 01/28/2014    Morbid obesity (H) 01/28/2014    Other B-complex deficiencies 08/19/2014    Other specified disease of hair and hair follicles     Primary central sleep apnea 01/26/2016    Reactive airway disease that is not asthma      Problem list name updated by automated process. Provider to review    Recurrent major depressive disorder (H) 05/21/2019    Uncomplicated asthma     Unspecified immunity deficiency     Urinary, incontinence, stress female 01/11/2021    Vitamin D deficiency 01/26/2016       Past Surgical History:   Procedure Laterality Date    BIOPSY      right breast--neg    BIOPSY BREAST Right 02/13/2014    DCIS, LOBULAR CARCINOMA    BIOPSY BREAST NEEDLE LOCALIZATION  2/13/2014    Procedure: BIOPSY BREAST NEEDLE LOCALIZATION;  WIRE LOCALIZED SEGMENTAL RESECTION, RIGHT BREAST;  Surgeon: Shanika Beaver MD;  Location: [facility]    CARDIAC SURGERY      stent    CHOLECYSTECTOMY  2010    COLONOSCOPY  2009    COLONOSCOPY  11/12/2013    DILATION AND CURETTAGE, OPERATIVE HYSTEROSCOPY, COMBINED  03/2017    [facility]    LUMPECTOMY BREAST Right 02/13/2014    DCIS, LOBULAR CARCINOMA    ORTHOPEDIC SURGERY      heel surgery    ORTHOPEDIC SURGERY      rt knee meniiscus tear       Family History   Problem Relation Age of Onset    C.A.D. Father     Coronary Artery Disease Father     Cerebrovascular Disease Father     Prostate Cancer Father     Hypertension Mother     Cerebrovascular Disease  Mother     Breast Cancer Mother 55         age 71 left mastectomy    Thyroid Disease Other     Thyroid Disease Sister     Diabetes No family hx of     Asthma No family hx of     Hyperlipidemia No family hx of     Colon Cancer No family hx of     Other Cancer No family hx of     Anesthesia Reaction No family hx of     Genetic Disorder No family hx of        Social History     Socioeconomic History    Marital status: Single     Spouse name: Not on file    Number of children: Not on file    Years of education: Not on file    Highest education level: Not on file   Occupational History    Not on file   Tobacco Use    Smoking status: Former     Packs/day: 0.10     Years: 30.00     Pack years: 3.00     Types: Cigarettes     Start date: 8/15/1974     Quit date: 3/3/2013     Years since quitting: 10.4    Smokeless tobacco: Never   Vaping Use    Vaping Use: Never used   Substance and Sexual Activity    Alcohol use: Yes    Drug use: No    Sexual activity: Yes     Partners: Male   Other Topics Concern    Parent/sibling w/ CABG, MI or angioplasty before 65F 55M? No   Social History Narrative    Not on file     Social Determinants of Health     Financial Resource Strain: Not on file   Food Insecurity: Not on file   Transportation Needs: Not on file   Physical Activity: Not on file   Stress: Not on file   Social Connections: Not on file   Intimate Partner Violence: Not on file   Housing Stability: Not on file       Current Outpatient Medications   Medication    acetaminophen (TYLENOL) 650 MG CR tablet    albuterol (PROAIR HFA/PROVENTIL HFA/VENTOLIN HFA) 108 (90 Base) MCG/ACT inhaler    amLODIPine (NORVASC) 5 MG tablet    aspirin (ASA) 81 MG EC tablet    atorvastatin (LIPITOR) 40 MG tablet    calcium-vitamin D (CALTRATE) 600-400 MG-UNIT per tablet    escitalopram (LEXAPRO) 10 MG tablet    escitalopram (LEXAPRO) 20 MG tablet    furosemide (LASIX) 40 MG tablet    lisinopril (ZESTRIL) 40 MG tablet    montelukast (SINGULAIR) 10 MG  "tablet    Multiple Vitamin (DAILY MULTIVITAMIN PO)    Omega-3 Fatty Acids (OMEGA 3 PO)    vitamin B-12 (CYANOCOBALAMIN) 1000 MCG tablet    VITAMIN D, CHOLECALCIFEROL, PO    clopidogrel (PLAVIX) 75 MG tablet    diazepam (VALIUM) 5 MG tablet    doxylamine (UNISOM) 25 MG TABS tablet    nitroGLYcerin (NITROSTAT) 0.4 MG sublingual tablet    nystatin (MYCOSTATIN) 583343 UNIT/GM external powder     No current facility-administered medications for this visit.        Allergies   Allergen Reactions    Adhesive Tape      Steri strips left scar.    Latex      Possible allergy.       Review Of Systems:  Constitutional:    denies fever, weight changes, chills, and night sweats.  Eyes:    denies blurred or double vision  Ears/Nose/Throat:   denies ear pain, nose problems, difficulty swallowing; has post nasal drip  Respiratory:   ongoing shortness of breath-no change, denies cough   Skin:   denies rash, lesions  Breast/Chest wall:   denies pain, lumps or discharge  Cardiovascular:   denies chest pain, palpitations  Gastrointestinal:   denies abdominal pain, bloating, nausea, early satiety; no change in bowel habits or blood in stool  Genitourinary:   denies difficulty with urination, blood in urine  Musculoskeletal:    denies new muscle pain, bone pain  Neurologic:   denies lightheadedness, headaches, numbness or tingling  Psychiatric:   see hpi  Hematologic/Lymphatic/Immunologic:   denies easy bruising, easy bleeding, lumps or bumps noted  Endocrine:   Denies increased thirst      Physical Exam:  /80   Pulse 107   Temp 98  F (36.7  C) (Tympanic)   Resp 20   Ht 1.676 m (5' 6\")   Wt 138.2 kg (304 lb 10.8 oz)   SpO2 97%   BMI 49.18 kg/m      GENERAL APPEARANCE: Obese, alert and in no acute distress.  HEENT: Normocephalic, Sclerae anicteric.   NECK:   No asymmetry or masses, no thyromegaly.  LYMPHATICS: No palpable cervical, supraclavicular, axillary, or inguinal nodes   RESP: Lungs clear to auscultation bilaterally, " respirations regular and easy  CARDIOVASCULAR: Regular rate and rhythm. Normal S1, S2; no murmur, gallop, or rub.  ABDOMEN: Soft, nontender. Bowel sounds auscultated all 4 quadrants. No palpable organomegaly or masses.  BREAST/Chest wall: Right: No masses, nipple discharge, erythema, skin thickening, nipple inversion, skin dimpling, swelling Left: No masses, nipple discharge, erythema, skin thickening, nipple inversion, skin dimpling, swelling; breasts were examined in the supine and sitting position  MUSCULOSKELETAL: Extremities without gross deformities noted.   SKIN: No suspicious lesions or rashes to exposed skin  NEURO: Alert and oriented x 3.  Gait steady.  PSYCHIATRIC: Mentation and affect appear normal.  Mood appropriate.    Laboratory: None completed for today's visit    Imaging Studies: None completed for today's visit    ASSESSMENT/PLAN:    #1 high risk for breast cancer: History of DCIS and LCIS and completed 5 years of tamoxifen therapy.  Patient would like to continue with high rescreening.  We will plan mammogram alternating with MRI every 6 months.  Mammogram is scheduled for August.  I will see her back in January for a clinical breast exam with plan for MRI in February to be completed in [place] with the open scanner.     #1 History of DCIS: History of ductal carcinoma in situ of the right breast and lobular carcinoma in situ of the right breast diagnosed January 2014.  She underwent lumpectomy and was placed on tamoxifen 20 mg daily and completed 5 years of therapy.       #2 history of LCIS: Lobular carcinoma in situ completely excised January 2014.  She completed 5 years of tamoxifen therapy as above.  Plan was to continue with high risk screening.  Mammogram scheduled for August.      #3 family history of cancer: We will send another referral through the cancer risk management for genetic counseling.    I encouraged patient to call with any questions or concerns.    34 minutes spent in the  patient's encounter today with time spent in review of the chart along with in chart preparation.  Time was also spent obtaining a review of systems and performing a physical exam along with review of her family history, breast cancer risks and NCCN guidelines in regards to ongoing surveillance.  Spent in discussion of genetic counseling.  Time was also spent in placing orders and planning for follow-up along with chart documentation.    Matilde Patrick NP  APRN, FNP-BC, AOCNP

## 2023-07-25 NOTE — NURSING NOTE
"Oncology Rooming Note    July 25, 2023 2:30 PM   Vandana Jaeger is a 63 year old female who presents for:    Chief Complaint   Patient presents with    Oncology Clinic Visit     Follow up DCIS     Initial Vitals: /80   Pulse 107   Temp 98  F (36.7  C) (Tympanic)   Resp 20   Ht 1.676 m (5' 6\")   Wt 138.2 kg (304 lb 10.8 oz)   SpO2 97%   BMI 49.18 kg/m   Estimated body mass index is 49.18 kg/m  as calculated from the following:    Height as of this encounter: 1.676 m (5' 6\").    Weight as of this encounter: 138.2 kg (304 lb 10.8 oz). Body surface area is 2.54 meters squared.  No Pain (0) Comment: Data Unavailable   No LMP recorded. Patient is postmenopausal.  Allergies reviewed: Yes  Medications reviewed: Yes    Medications: Medication refills not needed today.  Pharmacy name entered into [employer]: [facility] - [place], [place] - [address] [facility] [address]           Darling Bain LPN             "

## 2023-08-10 DIAGNOSIS — I10 ESSENTIAL HYPERTENSION: ICD-10-CM

## 2023-08-10 DIAGNOSIS — F33.0 MILD EPISODE OF RECURRENT MAJOR DEPRESSIVE DISORDER (H): ICD-10-CM

## 2023-08-11 RX ORDER — ESCITALOPRAM OXALATE 10 MG/1
TABLET ORAL
Qty: 60 TABLET | Refills: 0 | Status: SHIPPED | OUTPATIENT
Start: 2023-08-11 | End: 2023-10-09

## 2023-08-11 RX ORDER — ESCITALOPRAM OXALATE 20 MG/1
TABLET ORAL
Qty: 60 TABLET | Refills: 0 | Status: SHIPPED | OUTPATIENT
Start: 2023-08-11 | End: 2023-10-09

## 2023-08-11 RX ORDER — AMLODIPINE BESYLATE 5 MG/1
TABLET ORAL
Qty: 90 TABLET | Refills: 0 | Status: SHIPPED | OUTPATIENT
Start: 2023-08-11 | End: 2023-12-04 | Stop reason: DRUGHIGH

## 2023-08-11 NOTE — TELEPHONE ENCOUNTER
Amlodipine (Norvasc) 5 mg tablet    Last Written Prescription Date:  06/16/2023  Last Fill Quantity: 90,   # refills: 0  Last Office Visit: 06/28/2023

## 2023-08-11 NOTE — TELEPHONE ENCOUNTER
Escitalopram (Lexapro) 10 MG tablet    Last Written Prescription Date:  06/16/2023  Last Fill Quantity: 60,   # refills: 0  Last Office Visit: 06/28/2023       Escitalopram (Lexapro) 20 MG tablet  Last Written Prescription Date:  06/16/2023  Last Fill Quantity: 60,   # refills: 0

## 2023-08-30 ENCOUNTER — ANCILLARY PROCEDURE (OUTPATIENT)
Dept: MAMMOGRAPHY | Facility: OTHER | Age: 63
End: 2023-08-30
Attending: NURSE PRACTITIONER
Payer: COMMERCIAL

## 2023-08-30 ENCOUNTER — TELEPHONE (OUTPATIENT)
Dept: MAMMOGRAPHY | Facility: OTHER | Age: 63
End: 2023-08-30

## 2023-08-30 DIAGNOSIS — Z12.31 ENCOUNTER FOR SCREENING MAMMOGRAM FOR MALIGNANT NEOPLASM OF BREAST: ICD-10-CM

## 2023-08-30 DIAGNOSIS — Z85.3 PERSONAL HISTORY OF MALIGNANT NEOPLASM OF BREAST: ICD-10-CM

## 2023-08-30 PROCEDURE — 77063 BREAST TOMOSYNTHESIS BI: CPT | Mod: TC | Performed by: RADIOLOGY

## 2023-08-30 PROCEDURE — 77067 SCR MAMMO BI INCL CAD: CPT | Mod: TC | Performed by: RADIOLOGY

## 2023-09-13 ENCOUNTER — TELEPHONE (OUTPATIENT)
Dept: ONCOLOGY | Facility: OTHER | Age: 63
End: 2023-09-13

## 2023-09-13 NOTE — TELEPHONE ENCOUNTER
Please call patient and let her know that I did see her mammogram was completed in August.  If she wants to continue with the high risk screening and have an MRI completed this would be due in February.  She would need to go to [place] to [place], to have this completed since I know she could not tolerate the MRI machine here.  We could have her try to get it done at [facility] or at [facility] but I have no guarantee that their machines are any different than ours.  Please let me know what she would like to do.

## 2023-09-14 NOTE — TELEPHONE ENCOUNTER
I spoke with patient and she called all 3 places and the demensions of the MRI machine are all the same. At this time she will not do a MRI due to the size and the way she fits in MRI. Per Birdie she could follow up with her Primary for annual breast exam and Mammogram. Patient states she is going on a chick noodle soup diet and loose weight and call us in February. Birdie aware.  Darling Bain LPN

## 2023-09-14 NOTE — TELEPHONE ENCOUNTER
I spoke with patient and gave her recommendations for high risk screening - MRI information for [facility] in [place] and possibly [facility] and or [facility] in [place]. She states she does not want to go to [place] and might just skip High risk MRI as she states she can not tolerated the MRI machine. She states she might call [place] [facility] and [facility] to check what kind of MRI they have. She states she will call us and let us know if she wants to proceed in the future.  Darling Bain LPN

## 2023-09-21 ENCOUNTER — TELEPHONE (OUTPATIENT)
Dept: ONCOLOGY | Facility: OTHER | Age: 63
End: 2023-09-21

## 2023-09-21 NOTE — TELEPHONE ENCOUNTER
Telephone call to [facility] radiology in [facility] and I spoke with the MRI tech who states they do have a wide bore MRI machine that they are able to do breast MRIs on.  He said there is no way to guarantee that the machine will be okay for her but he feels that with her height and weight she should be able to complete the breast MRI there.  Telephone call to patient to discuss and I left a message for her to call me back.

## 2023-10-02 DIAGNOSIS — J98.9 REACTIVE AIRWAY DISEASE WITHOUT ASTHMA: ICD-10-CM

## 2023-10-03 RX ORDER — ALBUTEROL SULFATE 90 UG/1
AEROSOL, METERED RESPIRATORY (INHALATION)
Qty: 8.5 G | Refills: 0 | Status: SHIPPED | OUTPATIENT
Start: 2023-10-03 | End: 2024-07-24

## 2023-10-03 NOTE — TELEPHONE ENCOUNTER
ANURAG      Last Written Prescription Date:  12-16-22  Last Fill Quantity: 1,   # refills: 0  Last Office Visit: 6-28-23  Future Office visit:    Next 5 appointments (look out 90 days)      Dec 04, 2023 10:30 AM  (Arrive by 10:15 AM)  SHORT with Jackie Hitchcock CNP  [facility] ([facility] ) [address]  [facility] [address]  [phone]             Routing refill request to provider for review/approval because:

## 2023-10-06 DIAGNOSIS — F33.0 MILD EPISODE OF RECURRENT MAJOR DEPRESSIVE DISORDER (H): ICD-10-CM

## 2023-10-09 RX ORDER — ESCITALOPRAM OXALATE 10 MG/1
TABLET ORAL
Qty: 60 TABLET | Refills: 0 | Status: SHIPPED | OUTPATIENT
Start: 2023-10-09 | End: 2023-12-04

## 2023-10-09 RX ORDER — ESCITALOPRAM OXALATE 20 MG/1
TABLET ORAL
Qty: 60 TABLET | Refills: 0 | Status: SHIPPED | OUTPATIENT
Start: 2023-10-09 | End: 2023-12-04

## 2023-10-09 NOTE — TELEPHONE ENCOUNTER
Lexapro 10mg      Last Written Prescription Date:  8/11/23  Last Fill Quantity: 60,   # refills: 0  Last Office Visit: 6/28/23  Future Office visit:    Next 5 appointments (look out 90 days)      Dec 04, 2023 10:30 AM  (Arrive by 10:15 AM)  SHORT with Jackie Hitchcock CNP  [facility] ([facility] ) [address]  [place] [address]  [phone]             Routing refill request to provider for review/approval because:    Lexapro 20mg      Last Written Prescription Date:  8/11/23  Last Fill Quantity: 60,   # refills: 0  Last Office Visit: 6/28/23  Future Office visit:    Next 5 appointments (look out 90 days)      Dec 04, 2023 10:30 AM  (Arrive by 10:15 AM)  SHORT with Jackie Hitchcock CNP  [facility] ([facility] ) [address]  [place] [address]  [phone]             Routing refill request to provider for review/approval because:

## 2023-10-28 DIAGNOSIS — J31.0 CHRONIC RHINITIS: ICD-10-CM

## 2023-10-30 RX ORDER — MONTELUKAST SODIUM 10 MG/1
1 TABLET ORAL AT BEDTIME
Qty: 90 TABLET | Refills: 0 | Status: SHIPPED | OUTPATIENT
Start: 2023-10-30 | End: 2024-02-02

## 2023-10-30 NOTE — TELEPHONE ENCOUNTER
Singulair      Last Written Prescription Date:  12/16/22  Last Fill Quantity: 90,   # refills: 3  Last Office Visit: 6/28/23  Future Office visit:    Next 5 appointments (look out 90 days)      Dec 04, 2023 10:30 AM  (Arrive by 10:15 AM)  SHORT with Jackie Hitchcock CNP  [facility] ([facility] ) [address] [facility] [address]  [phone]     Jan 23, 2024  2:40 PM  (Arrive by 2:25 PM)  Return Visit with Matilde Patrick NP  [facility] ([facility] ) [address] [facility] [id number] [phone]             Routing refill request to provider for review/approval because:

## 2023-11-30 NOTE — PROGRESS NOTES
Assessment & Plan         Essential hypertension  - Comprehensive metabolic panel  - Lipid Profile (Chol, Trig, HDL, LDL calc)  - TSH with free T4 reflex      Screening for hyperlipidemia  - Lipid profile ordered      Prediabetes  - Hemoglobin A1c      ASCVD (arteriosclerotic cardiovascular disease)  - Continue cardiology Follow-up       Vitamin D deficiency  - Vitamin D level      Anxiety  - Continue plan of care      Mild episode of recurrent major depressive disorder (H24)  - escitalopram (LEXAPRO) 20 MG tablet; Take 1 Tablet by mouth one time a day. Take along with 10 mg tablet for a total dose of 30 mg.  - escitalopram (LEXAPRO) 10 MG tablet; Take 1 Tablet by mouth one time a day. Take along with 20mg tablet for a total dose of 30mg.        Please continue to take all medication as directed  Please notify your pharmacy or our refill line of future refills needed.  Please return sooner than your next scheduled appointment with any concerns.      Flu and RSV given today          Follow-up with oncology as schedule        40  minutes spent by me on the date of the encounter doing chart review, review of outside records, review of test results, interpretation of tests, patient visit, and documentation            Return in about 6 months (around 6/4/2024).        Jackie Hitchcock, CNP  [facility] - ANDRE Ross is a 63 year old, presenting for the following health issues:  Hypertension, Depression, Anxiety, and Vascular Disease        Hypertension Follow-up  Do you check your blood pressure regularly outside of the clinic? Yes   Are you following a low salt diet? Yes trying to does not add salt to food   Are your blood pressures ever more than 140 on the top number (systolic) OR more   than 90 on the bottom number (diastolic), for example 140/90? Yes till medication increase      Coronary Vascular Disease Follow-up  How often do you take nitroglycerin? Never  Do you take an aspirin every day?  Yes  Cardiology visit UTD - 11/6/23 - she will continue with Cardio Follow-up every 6 months  See last cardio clinic note below:      Alejandro Carlson, APRN, CNP - 11/06/2023 8:45 AM CST   Formatting of this note might be different from the original.  Increase Amlodipine to 10 mg daily with a goal blood pressure of 130/80.  Increase Furosemide to 80 mg twice daily.  Start Toprol XL 25 mg daily to help with fast HR.  To help with your ongoing nasal drainage you can try Flonase.  Continue to check blood pressure 2 hours after morning medication administration.  Call if you develop symptoms of lightheadedness, dizziness, increasing shortness of breath and fatigue. Or if you notice sensation of heart racing or palpitations.  5. Patient education given today on low sodium (2G/day) diet, instructed to weigh daily and to call with a weight gain of 2 pounds in a day or 5 pounds in a week. Also, discussed symptoms to be aware of that occur with worsening heart failure, such as increased shortness of breath, fatigue more than normal, swelling in legs, or abdominal bloating. Patient understands and agrees with the diagnosis and the treatment plan. All the patient's questions were answered satisfactorily.          Normal LM, LCx, and RCA  Severe obstructive lesion in the mid-LAD successfully treated with two KATIE with IVUS guidance  Elevated LVEDP     NM stress 07/2022  Interpretation Summary  Myocardial perfusion imaging is normal.  No evidence of myocardial infarction or inducible myocardial ischemia.  Overall left ventricular systolic function was normal without regional wall motion abnormalities. EF 65%.  No chest pain noted with stress.  Resting EKG with normal sinus rhythm and with stress there were no ST changes diagnostic of ischemia.  No prior study for comparison.     Echo 06/29/2022  Interpretation Summary  Qualitative ejection fraction is 60-65% (normal).  Left atrium is mildly enlarged by volume.  Indeterminate E/e'  ratio for estimation of filling pressures.  There is trace mitral regurgitation.  TR signal inadequate to allow accurate estimate RV systolic pressure.  Inferior vena cava size normal and collapsibility > 50% normal indicating normal right atrial pressure (3 mm Hg).    No prior echo for comparison.    ASSESSMENT/PLAN    (I25.10) Coronary artery disease involving native coronary artery of native heart without angina pectoris (primary encounter diagnosis)  Plan: Vandana denies any symptoms of angina. She is describing intolerances to activity but it is secondary to shortness of breath. She has not experienced any chest heaviness and she did have a negative stress test within the past year. We are going to continue her therapies with Amlodipine, Furosemide, Lisinopril, Atorvastatin and SL nitroglycerin on hand.    (I50.33) Acute on chronic diastolic heart failure (HCC)  Plan: She is continuing to describe fluid retention with mild LE edema and abdominal bloating. She also continues to have fatigue and THOMAS. In reviewing her diet we found that she has a high sodium intake which is likely contributing to her symptoms. After education she was going to try to make some lifestyle modifications. I am going to increase her Lasix to 80/80 given her normal BMP. She was also noted to be hypertensive which could be contributing to her symptoms and so we are going to increase her amlodipine to 10 mg daily. In reviewing her chart as well as her vital signs from today I found that her heart rate has been elevated in the low 100s. We are going to initiate metoprolol succinate 25 mg daily with a goal of lowering her heart rate into the 80s at rest.    (R06.09) Dyspnea on exertion  Plan: She continues to endorse THOMAS and I suspect this is multifactorial from obesity, allergies with continuous drainage, and ongoing fluid retention. We are going to uptitrate her diuretic therapy.    (I10) Essential hypertension  Plan: Hypertensive in the  clinic today and per her home report. We are going to increase amlodipine to 10 mg daily as well as increase her diuretic therapy to 80 mg twice daily.    (R09.82) Post-nasal drip  Plan: One of her major complaints here today was drainage and a chronic cough. We discussed multiple approaches to this chronic problem including Flonase nasal spray and utilizing the Kennett pot. She was going to look into obtaining both of these and see how her symptoms improved.    Follow up in 2 Months with Alejandro GALVEZ CNP    Electronically signed by Alejandro aCrlson APRN, CNP at 11/06/2023 3:23 PM CST           Personal history of breast cancer  Stable, feels well  Upcoming appt with Oncology ([facility]) in January        Depression and Anxiety Follow-Up  How are you doing with your depression since your last visit? Improved   How are you doing with your anxiety since your last visit?  Improved   Are you having other symptoms that might be associated with depression or anxiety? No  Have you had a significant life event? OTHER: retired     Do you have any concerns with your use of alcohol or other drugs? No        Social History     Tobacco Use     Smoking status: Former     Packs/day: 0.10     Years: 30.00     Additional pack years: 0.00     Total pack years: 3.00     Types: Cigarettes     Start date: 8/15/1974     Quit date: 3/3/2013     Years since quitting: 10.7     Smokeless tobacco: Never   Vaping Use     Vaping Use: Never used   Substance Use Topics     Alcohol use: Yes     Drug use: No             12/16/2022    10:13 AM 6/28/2023    11:18 AM 12/4/2023    10:29 AM   PHQ   PHQ-9 Total Score 5 3 3   Q9: Thoughts of better off dead/self-harm past 2 weeks Not at all Not at all Not at all             10/13/2021    10:00 AM 12/16/2022    10:14 AM 6/28/2023    11:20 AM   REUBEN-7 SCORE   Total Score   5 (mild anxiety)   Total Score 2 5 5             12/4/2023    10:29 AM   Last PHQ-9   1.  Little interest or pleasure in doing things 1    2.  Feeling down, depressed, or hopeless 1   3.  Trouble falling or staying asleep, or sleeping too much 0   4.  Feeling tired or having little energy 1   5.  Poor appetite or overeating 0   6.  Feeling bad about yourself 0   7.  Trouble concentrating 0   8.  Moving slowly or restless 0   Q9: Thoughts of better off dead/self-harm past 2 weeks 0   PHQ-9 Total Score 3   Difficulty at work, home, or with people Not difficult at all             6/28/2023    11:20 AM   REUBEN-7    1. Feeling nervous, anxious, or on edge 1   2. Not being able to stop or control worrying 1   3. Worrying too much about different things 1   4. Trouble relaxing 0   5. Being so restless that it is hard to sit still 0   6. Becoming easily annoyed or irritable 1   7. Feeling afraid, as if something awful might happen 1   REUBEN-7 Total Score 5   If you checked any problems, how difficult have they made it for you to do your work, take care of things at home, or get along with other people? 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     Ductal carcinoma in situ (DCIS) of right breast     Anxiety     Reactive airway disease that is not asthma     Contact dermatitis and other eczema, due to unspecified cause     Essential hypertension     Vitamin D deficiency     ACP (advance care planning)     Use of tamoxifen (Nolvadex)     Morbid obesity with body mass index (BMI) of 40.0 to 44.9 in adult (H)     Recurrent major depressive disorder (H24)     History of COVID-19     Urinary, incontinence, stress female     ASCVD (arteriosclerotic cardiovascular disease)     Hx of heart artery stent     Chondromalacia of patella     Disorder of refraction and accommodation     Medial meniscus tear     Impaired gait and mobility     Impairment of balance     Right knee pain     Chronic fatigue     Age-related nuclear cataract, bilateral     Cupping of optic disc, right     Lesion of left  upper eyelid     Prediabetes     Past Surgical History:   Procedure Laterality Date     BIOPSY      right breast--neg     BIOPSY BREAST Right 2014    DCIS, LOBULAR CARCINOMA     BIOPSY BREAST NEEDLE LOCALIZATION  2014    Procedure: BIOPSY BREAST NEEDLE LOCALIZATION;  WIRE LOCALIZED SEGMENTAL RESECTION, RIGHT BREAST;  Surgeon: Shanika Beaver MD;  Location: [facility]     CARDIAC SURGERY      stent     CHOLECYSTECTOMY       COLONOSCOPY       COLONOSCOPY  2013     DILATION AND CURETTAGE, OPERATIVE HYSTEROSCOPY, COMBINED  2017    [facility]     LUMPECTOMY BREAST Right 2014    DCIS, LOBULAR CARCINOMA     ORTHOPEDIC SURGERY      heel surgery     ORTHOPEDIC SURGERY      rt knee meniiscus tear       Social History     Tobacco Use     Smoking status: Former     Packs/day: 0.10     Years: 30.00     Additional pack years: 0.00     Total pack years: 3.00     Types: Cigarettes     Start date: 8/15/1974     Quit date: 3/3/2013     Years since quitting: 10.7     Smokeless tobacco: Never   Substance Use Topics     Alcohol use: Yes     Family History   Problem Relation Age of Onset     Hypertension Mother      Cerebrovascular Disease Mother      Breast Cancer Mother 55         age 71 left mastectomy     C.A.D. Father      Coronary Artery Disease Father      Cerebrovascular Disease Father      Prostate Cancer Father      Thyroid Disease Sister      Breast Cancer Maternal Aunt      Thyroid Disease Other      Diabetes No family hx of      Asthma No family hx of      Hyperlipidemia No family hx of      Colon Cancer No family hx of      Other Cancer No family hx of      Anesthesia Reaction No family hx of      Genetic Disorder No family hx of                Current Outpatient Medications   Medication Sig Dispense Refill     acetaminophen (TYLENOL) 650 MG CR tablet Take 650 mg by mouth every 8 hours as needed for mild pain or fever       albuterol (PROAIR HFA) 108 (90 Base) MCG/ACT inhaler Inhale 2 Puffs  into the lungs every four hours as needed for shortness of breath / dyspnea or wheezing 8.5 g 0     amLODIPine (NORVASC) 10 MG tablet Take 10 mg by mouth daily       aspirin (ASA) 81 MG EC tablet Take 1 tablet (81 mg) by mouth daily 90 tablet 11     atorvastatin (LIPITOR) 40 MG tablet Take 1 Tablet by mouth one time a day. 90 tablet 1     calcium-vitamin D (CALTRATE) 600-400 MG-UNIT per tablet Take 1 tablet by mouth 2 times daily       escitalopram (LEXAPRO) 10 MG tablet Take 1 Tablet by mouth one time a day. Take along with 20mg tablet for a total dose of 30mg. 60 tablet 0     escitalopram (LEXAPRO) 20 MG tablet Take 1 Tablet by mouth one time a day. Take along with 10 mg tablet for a total dose of 30 mg. 60 tablet 0     furosemide (LASIX) 40 MG tablet Take 80 mg by mouth 2 times daily       lisinopril (ZESTRIL) 40 MG tablet Take 1 Tablet by mouth one time a day. 90 tablet 1     metoprolol succinate ER (TOPROL XL) 25 MG 24 hr tablet Take 25 mg by mouth daily       montelukast (SINGULAIR) 10 MG tablet Take 1 Tablet by mouth at bedtime. 90 tablet 0     Multiple Vitamin (DAILY MULTIVITAMIN PO) Take one tablet by oral route every day with food       Omega-3 Fatty Acids (OMEGA 3 PO) Take 1,000 mg by mouth daily        potassium chloride ER (KLOR-CON M) 20 MEQ CR tablet Take 20 mEq by mouth daily       spironolactone (ALDACTONE) 25 MG tablet Take 1 tablet by mouth daily       vitamin B-12 (CYANOCOBALAMIN) 1000 MCG tablet Take 1,000 mcg by mouth daily 30 tablet      VITAMIN D, CHOLECALCIFEROL, PO Take 5,000 Units by mouth daily        nitroGLYcerin (NITROSTAT) 0.4 MG sublingual tablet Place 1 tablet (0.4 mg) under the tongue every 5 minutes as needed for chest pain (Patient not taking: Reported on 7/25/2023) 8 tablet 0     nystatin (MYCOSTATIN) 190432 UNIT/GM external powder Apply 100,000 g topically 2 times daily as needed (Patient not taking: Reported on 6/7/2022) 60 g 3         Allergies   Allergen Reactions      "Adhesive Tape      Steri strips left scar.     Latex      Possible allergy.         Recent Labs   Lab Test 06/28/23  1132 12/16/22  1031 10/05/21  1019 07/14/21  1511 04/06/21  1013 02/03/21  0000 01/11/21  1617   A1C 5.5  --   --   --   --   --   --    LDL 53 52  --   --  90  --   --    HDL 50 54  --   --  59  --   --    TRIG 178* 125  --   --  123  --   --    ALT 38 28 32   < > 47  --  35   CR 0.63 0.56 0.70   < > 0.58   < > 0.61   GFRESTIMATED >90 >90 >90   < > >90   < > >90   GFRESTBLACK  --   --   --   --  >90  --  >90   POTASSIUM 4.0 4.3 4.4   < > 3.7   < > 3.7   TSH 1.39 1.27  --   --  0.87  --  2.90    < > = values in this interval not displayed.            BP Readings from Last 3 Encounters:   12/04/23 124/62   07/25/23 132/80   06/28/23 132/78    Wt Readings from Last 3 Encounters:   12/04/23 134.6 kg (296 lb 11.2 oz)   07/25/23 138.2 kg (304 lb 10.8 oz)   06/28/23 136.1 kg (300 lb)                   Review of Systems   Constitutional, HEENT, cardiovascular, pulmonary, GI, , musculoskeletal, neuro, skin, endocrine and psych systems are negative, except as otherwise noted.        Objective    /62 (BP Location: Left arm, Patient Position: Chair, Cuff Size: Adult Large)   Pulse 94   Temp 98.8  F (37.1  C) (Tympanic)   Resp 20   Ht 1.676 m (5' 6\")   Wt 134.6 kg (296 lb 11.2 oz)   SpO2 99%   BMI 47.89 kg/m    Body mass index is 47.89 kg/m .        Physical Exam   GENERAL: healthy, alert and no distress  EYES: Eyes grossly normal to inspection, PERRL and conjunctivae and sclerae normal  HENT: ear canals and TM's normal, nose and mouth without ulcers or lesions  NECK: no adenopathy, no asymmetry, masses, or scars and thyroid normal to palpation  RESP: lungs clear to auscultation - no rales, rhonchi or wheezes  CV: regular rate and rhythm, normal S1 S2, no S3 or S4, no murmur, click or rub, no peripheral edema and peripheral pulses strong  MS: no gross musculoskeletal defects noted, no edema  SKIN: " no suspicious lesions or rashes  PSYCH: mentation appears normal, affect normal/bright

## 2023-12-04 ENCOUNTER — OFFICE VISIT (OUTPATIENT)
Dept: FAMILY MEDICINE | Facility: OTHER | Age: 63
End: 2023-12-04
Attending: NURSE PRACTITIONER
Payer: COMMERCIAL

## 2023-12-04 VITALS
OXYGEN SATURATION: 99 % | BODY MASS INDEX: 47.09 KG/M2 | RESPIRATION RATE: 20 BRPM | HEART RATE: 94 BPM | WEIGHT: 293 LBS | TEMPERATURE: 98.8 F | HEIGHT: 66 IN | SYSTOLIC BLOOD PRESSURE: 124 MMHG | DIASTOLIC BLOOD PRESSURE: 62 MMHG

## 2023-12-04 DIAGNOSIS — R73.03 PREDIABETES: ICD-10-CM

## 2023-12-04 DIAGNOSIS — Z13.220 SCREENING FOR HYPERLIPIDEMIA: ICD-10-CM

## 2023-12-04 DIAGNOSIS — F33.0 MILD EPISODE OF RECURRENT MAJOR DEPRESSIVE DISORDER (H): ICD-10-CM

## 2023-12-04 DIAGNOSIS — E55.9 VITAMIN D DEFICIENCY: ICD-10-CM

## 2023-12-04 DIAGNOSIS — F41.9 ANXIETY: ICD-10-CM

## 2023-12-04 DIAGNOSIS — I10 ESSENTIAL HYPERTENSION: Primary | ICD-10-CM

## 2023-12-04 DIAGNOSIS — I25.10 ASCVD (ARTERIOSCLEROTIC CARDIOVASCULAR DISEASE): ICD-10-CM

## 2023-12-04 LAB
ALBUMIN SERPL BCG-MCNC: 4.6 G/DL (ref 3.5–5.2)
ALP SERPL-CCNC: 65 U/L (ref 40–150)
ALT SERPL W P-5'-P-CCNC: 37 U/L (ref 0–50)
ANION GAP SERPL CALCULATED.3IONS-SCNC: 17 MMOL/L (ref 7–15)
AST SERPL W P-5'-P-CCNC: 28 U/L (ref 0–45)
BILIRUB SERPL-MCNC: 0.3 MG/DL
BUN SERPL-MCNC: 26.9 MG/DL (ref 8–23)
CALCIUM SERPL-MCNC: 9.7 MG/DL (ref 8.8–10.2)
CHLORIDE SERPL-SCNC: 97 MMOL/L (ref 98–107)
CHOLEST SERPL-MCNC: 150 MG/DL
CREAT SERPL-MCNC: 0.95 MG/DL (ref 0.51–0.95)
DEPRECATED HCO3 PLAS-SCNC: 19 MMOL/L (ref 22–29)
EGFRCR SERPLBLD CKD-EPI 2021: 67 ML/MIN/1.73M2
EST. AVERAGE GLUCOSE BLD GHB EST-MCNC: 117 MG/DL
GLUCOSE SERPL-MCNC: 113 MG/DL (ref 70–99)
HBA1C MFR BLD: 5.7 %
HDLC SERPL-MCNC: 50 MG/DL
LDLC SERPL CALC-MCNC: 63 MG/DL
NONHDLC SERPL-MCNC: 100 MG/DL
POTASSIUM SERPL-SCNC: 4.5 MMOL/L (ref 3.4–5.3)
PROT SERPL-MCNC: 7.8 G/DL (ref 6.4–8.3)
SODIUM SERPL-SCNC: 133 MMOL/L (ref 135–145)
TRIGL SERPL-MCNC: 186 MG/DL
TSH SERPL DL<=0.005 MIU/L-ACNC: 1.39 UIU/ML (ref 0.3–4.2)
VIT D+METAB SERPL-MCNC: 43 NG/ML (ref 20–50)

## 2023-12-04 PROCEDURE — 80053 COMPREHEN METABOLIC PANEL: CPT | Performed by: NURSE PRACTITIONER

## 2023-12-04 PROCEDURE — 90678 RSV VACC PREF BIVALENT IM: CPT | Performed by: NURSE PRACTITIONER

## 2023-12-04 PROCEDURE — 36415 COLL VENOUS BLD VENIPUNCTURE: CPT | Performed by: NURSE PRACTITIONER

## 2023-12-04 PROCEDURE — 82306 VITAMIN D 25 HYDROXY: CPT | Performed by: NURSE PRACTITIONER

## 2023-12-04 PROCEDURE — 90472 IMMUNIZATION ADMIN EACH ADD: CPT | Performed by: NURSE PRACTITIONER

## 2023-12-04 PROCEDURE — 83036 HEMOGLOBIN GLYCOSYLATED A1C: CPT | Performed by: NURSE PRACTITIONER

## 2023-12-04 PROCEDURE — 99215 OFFICE O/P EST HI 40 MIN: CPT | Mod: 25 | Performed by: NURSE PRACTITIONER

## 2023-12-04 PROCEDURE — 90686 IIV4 VACC NO PRSV 0.5 ML IM: CPT | Performed by: NURSE PRACTITIONER

## 2023-12-04 PROCEDURE — 84443 ASSAY THYROID STIM HORMONE: CPT | Performed by: NURSE PRACTITIONER

## 2023-12-04 PROCEDURE — 90471 IMMUNIZATION ADMIN: CPT | Performed by: NURSE PRACTITIONER

## 2023-12-04 PROCEDURE — 80061 LIPID PANEL: CPT | Performed by: NURSE PRACTITIONER

## 2023-12-04 RX ORDER — LISINOPRIL 40 MG/1
40 TABLET ORAL DAILY
Qty: 90 TABLET | Refills: 1 | Status: CANCELLED | OUTPATIENT
Start: 2023-12-04

## 2023-12-04 RX ORDER — ESCITALOPRAM OXALATE 10 MG/1
TABLET ORAL
Qty: 90 TABLET | Refills: 1 | Status: SHIPPED | OUTPATIENT
Start: 2023-12-04 | End: 2024-04-19

## 2023-12-04 RX ORDER — ATORVASTATIN CALCIUM 40 MG/1
40 TABLET, FILM COATED ORAL DAILY
Qty: 90 TABLET | Refills: 1 | Status: CANCELLED | OUTPATIENT
Start: 2023-12-04

## 2023-12-04 RX ORDER — SPIRONOLACTONE 25 MG/1
1 TABLET ORAL DAILY
COMMUNITY
Start: 2023-10-02

## 2023-12-04 RX ORDER — ESCITALOPRAM OXALATE 20 MG/1
TABLET ORAL
Qty: 90 TABLET | Refills: 1 | Status: SHIPPED | OUTPATIENT
Start: 2023-12-04 | End: 2024-04-19

## 2023-12-04 RX ORDER — POTASSIUM CHLORIDE 1500 MG/1
20 TABLET, EXTENDED RELEASE ORAL DAILY
COMMUNITY
Start: 2023-11-06

## 2023-12-04 RX ORDER — AMLODIPINE BESYLATE 10 MG/1
10 TABLET ORAL DAILY
COMMUNITY
Start: 2023-11-06

## 2023-12-04 RX ORDER — METOPROLOL SUCCINATE 25 MG/1
25 TABLET, EXTENDED RELEASE ORAL DAILY
COMMUNITY
Start: 2023-11-06 | End: 2024-06-20

## 2023-12-04 ASSESSMENT — PAIN SCALES - GENERAL: PAINLEVEL: SEVERE PAIN (6)

## 2023-12-04 ASSESSMENT — ANXIETY QUESTIONNAIRES
7. FEELING AFRAID AS IF SOMETHING AWFUL MIGHT HAPPEN: NOT AT ALL
2. NOT BEING ABLE TO STOP OR CONTROL WORRYING: MORE THAN HALF THE DAYS
1. FEELING NERVOUS, ANXIOUS, OR ON EDGE: SEVERAL DAYS
3. WORRYING TOO MUCH ABOUT DIFFERENT THINGS: MORE THAN HALF THE DAYS
6. BECOMING EASILY ANNOYED OR IRRITABLE: NOT AT ALL
4. TROUBLE RELAXING: NOT AT ALL
5. BEING SO RESTLESS THAT IT IS HARD TO SIT STILL: NOT AT ALL
IF YOU CHECKED OFF ANY PROBLEMS ON THIS QUESTIONNAIRE, HOW DIFFICULT HAVE THESE PROBLEMS MADE IT FOR YOU TO DO YOUR WORK, TAKE CARE OF THINGS AT HOME, OR GET ALONG WITH OTHER PEOPLE: NOT DIFFICULT AT ALL
GAD7 TOTAL SCORE: 5
GAD7 TOTAL SCORE: 5

## 2023-12-04 ASSESSMENT — PATIENT HEALTH QUESTIONNAIRE - PHQ9: SUM OF ALL RESPONSES TO PHQ QUESTIONS 1-9: 3

## 2023-12-04 NOTE — PATIENT INSTRUCTIONS
Assessment & Plan       Essential hypertension  - Comprehensive metabolic panel  - Lipid Profile (Chol, Trig, HDL, LDL calc)  - TSH with free T4 reflex      Screening for hyperlipidemia  - Lipid profile ordered      Prediabetes  - Hemoglobin A1c      ASCVD (arteriosclerotic cardiovascular disease)  - Continue cardiology Follow-up       Vitamin D deficiency  - Vitamin D level      Anxiety  - Continue plan of care      Mild episode of recurrent major depressive disorder (H24)  - escitalopram (LEXAPRO) 20 MG tablet; Take 1 Tablet by mouth one time a day. Take along with 10 mg tablet for a total dose of 30 mg.  - escitalopram (LEXAPRO) 10 MG tablet; Take 1 Tablet by mouth one time a day. Take along with 20mg tablet for a total dose of 30mg.        Please continue to take all medication as directed  Please notify your pharmacy or our refill line of future refills needed.  Please return sooner than your next scheduled appointment with any concerns.           Return in about 6 months (around 6/4/2024).        Jackie Hitchcock CNP  [facility]

## 2023-12-04 NOTE — RESULT ENCOUNTER NOTE
Pls notify per pt request  Labs look good  A1C is good    D level will take a couple days    Jackie GAYTAN  [phone]

## 2024-01-05 NOTE — TELEPHONE ENCOUNTER
Called lab, she did not do the lab yet. Called patient, no answer on phone,  and left message that we would like her to get the lab d dimer done today.    Visit due - pls schedule  I filled #30    [facility]  [phone]

## 2024-01-17 DIAGNOSIS — I10 BENIGN ESSENTIAL HYPERTENSION: ICD-10-CM

## 2024-01-18 NOTE — TELEPHONE ENCOUNTER
Lisinopril      Last Written Prescription Date:  6/16/23  Last Fill Quantity: 90,   # refills: 1  Last Office Visit: 12/4/23  Future Office visit:    Next 5 appointments (look out 90 days)      Jan 23, 2024  2:40 PM  (Arrive by 2:25 PM)  Return Visit with Matilde Patrick NP  [facility] ([facility] [address] [id number] [phone]             Routing refill request to provider for review/approval because:

## 2024-01-19 RX ORDER — LISINOPRIL 40 MG/1
TABLET ORAL
Qty: 90 TABLET | Refills: 0 | Status: SHIPPED | OUTPATIENT
Start: 2024-01-19 | End: 2024-04-19

## 2024-02-02 DIAGNOSIS — J31.0 CHRONIC RHINITIS: ICD-10-CM

## 2024-02-02 RX ORDER — MONTELUKAST SODIUM 10 MG/1
1 TABLET ORAL AT BEDTIME
Qty: 90 TABLET | Refills: 0 | Status: SHIPPED | OUTPATIENT
Start: 2024-02-02 | End: 2024-04-19

## 2024-02-02 NOTE — TELEPHONE ENCOUNTER
montelukast (SINGULAIR) 10 MG tablet 90 tablet 0 10/30/2023     Last Office Visit: 12/4/23     Future Office visit:       Routing refill request to provider for review/approval because:

## 2024-02-02 NOTE — TELEPHONE ENCOUNTER
Leukotriene Inhibitors Protocol [id number]/02/2024 06:08 AM   Protocol Details Asthma control assessment score within normal limits in last 6 months         4/6/2021    10:00 AM   ACT Total Scores   ACT TOTAL SCORE (Goal Greater than or Equal to 20) 21   In the past 12 months, how many times did you visit the emergency room for your asthma without being admitted to the hospital? 0   In the past 12 months, how many times were you hospitalized overnight because of your asthma? 0

## 2024-02-06 ENCOUNTER — ONCOLOGY VISIT (OUTPATIENT)
Dept: ONCOLOGY | Facility: OTHER | Age: 64
End: 2024-02-06
Attending: NURSE PRACTITIONER
Payer: COMMERCIAL

## 2024-02-06 VITALS
OXYGEN SATURATION: 97 % | SYSTOLIC BLOOD PRESSURE: 120 MMHG | RESPIRATION RATE: 18 BRPM | WEIGHT: 293 LBS | DIASTOLIC BLOOD PRESSURE: 62 MMHG | HEART RATE: 88 BPM | BODY MASS INDEX: 48.05 KG/M2 | TEMPERATURE: 99.1 F

## 2024-02-06 DIAGNOSIS — Z86.000 HISTORY OF DUCTAL CARCINOMA IN SITU (DCIS) OF BREAST: Primary | ICD-10-CM

## 2024-02-06 DIAGNOSIS — Z91.89 AT HIGH RISK FOR BREAST CANCER: ICD-10-CM

## 2024-02-06 DIAGNOSIS — D05.01 LOBULAR CARCINOMA IN SITU OF RIGHT BREAST: ICD-10-CM

## 2024-02-06 DIAGNOSIS — Z80.9 FAMILY HISTORY OF CANCER: ICD-10-CM

## 2024-02-06 PROCEDURE — 99213 OFFICE O/P EST LOW 20 MIN: CPT | Performed by: NURSE PRACTITIONER

## 2024-02-06 RX ORDER — FUROSEMIDE 80 MG
80 TABLET ORAL 2 TIMES DAILY
COMMUNITY
Start: 2024-01-31

## 2024-02-06 RX ORDER — CARVEDILOL 12.5 MG/1
12.5 TABLET ORAL 2 TIMES DAILY WITH MEALS
COMMUNITY
Start: 2024-02-02

## 2024-02-06 RX ORDER — CALCIUM CARBONATE/VITAMIN D3 500-10/5ML
LIQUID (ML) ORAL
COMMUNITY

## 2024-02-06 ASSESSMENT — PAIN SCALES - GENERAL
PAINLEVEL: SEVERE PAIN (6)
PAINLEVEL: SEVERE PAIN (6)

## 2024-02-06 NOTE — PROGRESS NOTES
Oncology Follow-up Visit:  February 6, 2024    Reason for Visit:  Patient presents with:  Oncology Clinic Visit: Follow up Ductal carcinoma in situ (DCIS) of right breast       Nursing Note and documentation reviewed: yes    HPI:    This is a 63-year-old female patient who presents to the Oncology clinic today in follow-up of high risk breast cancer.   Last follow-up was completed in October 2021.  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  She was placed on tamoxifen 20 mg daily in March 2014 and completed 5 years of therapy discontinuing this in April 2019.  Plan was to go forth with breast cancer surveillance alternating mammogram with every 6 months     Interim HX:     She to the clinic today stating she is doing pretty good.  She has been working with cardiology in regards to her blood pressure.  She does describe a bit of a tremor today in both of her hands.  She does have some ongoing shortness of breath this is essentially stable.  She denies any changes or concerns with the breast tissue including any new masses or skin changes.  Mammogram was completed in August that was normal and she is due for breast MRI this month.  Previously we did have some concerns in regards to where she can have this completed.  We did send a referral at our last visit for her to see genetics and they were unable to get a hold of her.    Oncologic History:     Vandana had undergone an abnormal mammogram in November 2013 which showed microcalcifications suspicious for malignancy. She subsequently underwent stereotactic biopsy on November 21, 2013 with original interpretation of histopathology being that of hyperplasia without atypia.  This was then over read as part of routine protocol in January 2014.  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  She was evaluated by Dr. Shanika Beaver in Gen. Surgery on  2014 and bilateral MRI of the breast was obtained which revealed oval-shaped nodular densities in both breasts consistent with inframammary lymph nodes.      The patient was seen by Dr. Chávez of radiation oncology on 2014 and his impression was that the patient had DCIS and would require definitive surgery.     The patient underwent excisional breast biopsy on 2014 and pathology showed LCIS, multifocal, completely excised and low-grade, grade 1, DCIS  which was completely excised.  Tumor size was at least 0.5 cm DCIS grade 1; margins were uninvolved by DCIS.  Estrogen receptor was positive at 90% and progesterone receptor was positive at 50%.  Patient was staged pathologic Tis associated with LCIS.  She was started on tamoxifen.     Previous TX:  Tamoxifen 20 mg daily started 2014 and completed 3/2019     Family History: No changes to family history; Father prostate cancer-unsure of age (thinks 50's) at diagnoses and  at 81 of GB; mom breast cancer in 50's and  at 71 of lung issue     Risk Assessment:       HRT:  none  Previous Breast Biopsies: 2013 pathology showed hyperplasia without atypia and reread in 2014 as DCIS with LCIS  Exercise: no special exercise program but is active  Diet:  states healthy-  Alcohol:  Yes-not daily-may have 2 in evening 4-5 days a week  Smoking: none  Age of 1st menses:  13  Age of first pregnancy:  16 miscarraged and 27 for second pregnancy  H/o Breastfeeding:  3 months  LMP:  In s     Genetic Counseling:   Referral was sent     Genetic Testing:  None  .  Past Medical History:   Diagnosis Date    Anxiety state, unspecified     Arthritis     ASCVD (arteriosclerotic cardiovascular disease) 2021    Benign essential hypertension 2015    Breast cancer (H) 2014    DCIS, LOBULAR CARCINOMA  RIGHT BREAST    Contact dermatitis and other eczema, due to unspecified cause     Coronary artery disease     Depressive disorder      Ductal carcinoma in situ of right breast 2014    Ductal carcinoma in situ of right breast 2014    H/O herpes zoster     History of COVID-19 2021    Hx of heart artery stent 2021    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 2014    Morbid obesity (H) 2014    Other B-complex deficiencies 2014    Other specified disease of hair and hair follicles     Primary central sleep apnea 2016    Reactive airway disease that is not asthma      Problem list name updated by automated process. Provider to review    Recurrent major depressive disorder (H24) 2019    Uncomplicated asthma     Unspecified immunity deficiency     Urinary, incontinence, stress female 2021    Vitamin D deficiency 2016       Past Surgical History:   Procedure Laterality Date    BIOPSY      right breast--neg    BIOPSY BREAST Right 2014    DCIS, LOBULAR CARCINOMA    BIOPSY BREAST NEEDLE LOCALIZATION  2014    Procedure: BIOPSY BREAST NEEDLE LOCALIZATION;  WIRE LOCALIZED SEGMENTAL RESECTION, RIGHT BREAST;  Surgeon: Shanika Beaver MD;  Location: [facility]    CARDIAC SURGERY      stent    CHOLECYSTECTOMY      COLONOSCOPY      COLONOSCOPY  2013    DILATION AND CURETTAGE, OPERATIVE HYSTEROSCOPY, COMBINED  2017    [facility]    LUMPECTOMY BREAST Right 2014    DCIS, LOBULAR CARCINOMA    ORTHOPEDIC SURGERY      heel surgery    ORTHOPEDIC SURGERY      rt knee meniiscus tear       Family History   Problem Relation Age of Onset    Hypertension Mother     Cerebrovascular Disease Mother     Breast Cancer Mother 55         age 71 left mastectomy    C.A.D. Father     Coronary Artery Disease Father     Cerebrovascular Disease Father     Prostate Cancer Father     Thyroid Disease Sister     Breast Cancer Maternal Aunt     Thyroid Disease Other     Diabetes No family hx of     Asthma No family hx of     Hyperlipidemia No family hx of     Colon Cancer No family hx of     Other  Cancer No family hx of     Anesthesia Reaction No family hx of     Genetic Disorder No family hx of        Social History     Socioeconomic History    Marital status: Single     Spouse name: Not on file    Number of children: Not on file    Years of education: Not on file    Highest education level: Not on file   Occupational History    Not on file   Tobacco Use    Smoking status: Former     Packs/day: 0.10     Years: 30.00     Additional pack years: 0.00     Total pack years: 3.00     Types: Cigarettes     Start date: 8/15/1974     Quit date: 3/3/2013     Years since quitting: 10.9    Smokeless tobacco: Never   Vaping Use    Vaping Use: Never used   Substance and Sexual Activity    Alcohol use: Yes    Drug use: No    Sexual activity: Yes     Partners: Male   Other Topics Concern    Parent/sibling w/ CABG, MI or angioplasty before 65F 55M? No   Social History Narrative    Not on file     Social Determinants of Health     Financial Resource Strain: Not on file   Food Insecurity: Not on file   Transportation Needs: Not on file   Physical Activity: Not on file   Stress: Not on file   Social Connections: Not on file   Interpersonal Safety: Low Risk  (12/4/2023)    Interpersonal Safety     Do you feel physically and emotionally safe where you currently live?: Yes     Within the past 12 months, have you been hit, slapped, kicked or otherwise physically hurt by someone?: No     Within the past 12 months, have you been humiliated or emotionally abused in other ways by your partner or ex-partner?: No   Housing Stability: Not on file       Current Outpatient Medications   Medication    acetaminophen (TYLENOL) 650 MG CR tablet    albuterol (PROAIR HFA) 108 (90 Base) MCG/ACT inhaler    amLODIPine (NORVASC) 10 MG tablet    aspirin (ASA) 81 MG EC tablet    atorvastatin (LIPITOR) 40 MG tablet    calcium-vitamin D (CALTRATE) 600-400 MG-UNIT per tablet    carvedilol (COREG) 12.5 MG tablet    escitalopram (LEXAPRO) 10 MG tablet     escitalopram (LEXAPRO) 20 MG tablet    furosemide (LASIX) 80 MG tablet    lisinopril (ZESTRIL) 40 MG tablet    magnesium oxide 400 MG CAPS    metoprolol succinate ER (TOPROL XL) 25 MG 24 hr tablet    montelukast (SINGULAIR) 10 MG tablet    Multiple Vitamin (DAILY MULTIVITAMIN PO)    Omega-3 Fatty Acids (OMEGA 3 PO)    potassium chloride ER (KLOR-CON M) 20 MEQ CR tablet    spironolactone (ALDACTONE) 25 MG tablet    vitamin B-12 (CYANOCOBALAMIN) 1000 MCG tablet    VITAMIN D, CHOLECALCIFEROL, PO    nitroGLYcerin (NITROSTAT) 0.4 MG sublingual tablet    nystatin (MYCOSTATIN) 775641 UNIT/GM external powder     No current facility-administered medications for this visit.        Allergies   Allergen Reactions    Adhesive Tape      Steri strips left scar.    Latex      Possible allergy.       Review Of Systems:  Constitutional:    denies fever, weight changes, chills, and night sweats.  Eyes:    denies blurred or double vision  Ears/Nose/Throat:   denies ear pain; no problems swallowing; chronic sinus issues  Respiratory:   see hPI  Skin:   denies rash, lesions  Breast/Chest wall:   denies pain, lumps or discharge or skin changes  Cardiovascular:   denies chest pain, palpitations, mild  to lower extremities  Gastrointestinal:   denies abdominal pain, bloating, nausea, early satiety; no change in bowel habits or blood in stool  Genitourinary:   denies difficulty with urination, blood in urine  Musculoskeletal:    left shoulder pain 2 years  Neurologic:   denies headaches, frequent numbness or tingling; has lightheadedness at times  Psychiatric:   denies anxiety, depression  Hematologic/Lymphatic/Immunologic:   denies easy bruising, easy bleeding, lumps or bumps noted  Endocrine:   Denies increased thirst      Physical Exam:  /62 (BP Location: Right arm, Patient Position: Sitting, Cuff Size: Adult Large)   Pulse 88   Temp 99.1  F (37.3  C) (Tympanic)   Resp 18   Wt 135 kg (297 lb 11.2 oz)   SpO2 97%   BMI 48.05  kg/m      GENERAL APPEARANCE: Healthy, alert and in no acute distress.  HEENT: Normocephalic, Sclerae anicteric.  No concerning oral lesions or thrush  NECK:   No asymmetry or masses, no thyromegaly.  LYMPHATICS: No palpable cervical, supraclavicular, axillary, or inguinal nodes   RESP: Lungs clear to auscultation bilaterally, respirations regular and easy  CARDIOVASCULAR: Regular rate and rhythm. Normal S1, S2; no murmur, gallop, or rub.  ABDOMEN: Soft, nontender. Bowel sounds auscultated all 4 quadrants. No palpable organomegaly or masses.  BREAST/Chest wall: Right: No masses, nipple discharge, erythema, skin thickening, nipple inversion, skin dimpling, swelling Left: No masses, nipple discharge, erythema, skin thickening, nipple inversion, skin dimpling, swelling; breasts were examined in the supine and sitting position  MUSCULOSKELETAL: Extremities without gross deformities noted.   SKIN: No suspicious lesions or rashes to exposed skin  NEURO: Alert and oriented x 3.  Gait steady.  PSYCHIATRIC: Mentation and affect appear normal.  Mood appropriate.    Laboratory: None completed for today's visit    Imaging Studies: None Completed for today's visit    BILATERAL FULL FIELD DIGITAL SCREENING MAMMOGRAM WITH TOMOSYNTHESIS     Performed on: 8/30/23     Compared to: 12/14/2021, 03/30/2021, 01/24/2020, 01/20/2020, and 01/18/2019     Technique:  This study was evaluated with the assistance of Computer-Aided Detection.  Breast Tomosynthesis was used in interpretation.     Findings: The breasts have scattered areas of fibroglandular density.  There are post-surgical changes in the right breast.  There is no radiographic evidence of malignancy.                                                                    IMPRESSION: ACR BI-RADS Category 2: Benign     RECOMMENDED FOLLOW-UP: Annual routine screening mammogram     The results and recommendations of this examination will be communicated to the patient.     Jackie SALAZAR  MD Rajiv    ASSESSMENT/PLAN:    #1 high risk for breast cancer: History of DCIS and LCIS and completed 5 years of tamoxifen therapy.  Patient would like to continue with high screening alternating mammogram with MRI every 6 months.  She is due for breast MRI this month and we did discuss that [facility] in [place] does have an MRI scanner that should work for her height and weight.  She is aware that I did talk with one of the technicians back in August regarding this.  Patient will decide on this.  We also did discuss possible other way of screening which would be 3D mammogram with IV contrast and we did discuss that the efficacy is not really known but it would be an alternative if she is unable to have the MRI completed.  She will let me know what she decides.      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  She underwent lumpectomy and was placed on tamoxifen 20 mg daily and completed 5 years of therapy.       #2 history of LCIS: Lobular carcinoma in situ completely excised January 2014.  She completed 5 years of tamoxifen therapy as above.  Plan was to continue with high risk screening.        #3 family history of cancer: Number was given for the cancer risk management group scheduling and she will contact them to schedule an appointment.       I encouraged patient to call with any questions or concerns.    Matilde Patrick NP  APRN, FNP-BC, AOCNP

## 2024-02-06 NOTE — PATIENT INSTRUCTIONS
We will send an order for a bilateral breast MRI to [facility] in [place].    Please let us know what you decide in regards to high risk screening.    If you have any questions please call [phone]    Other instructions:      We discussed the following healthy lifestyle plan recommended by both NCCN and the American Cancer Society that can reduce the risk of breast cancer.      1.  Limit alcohol consumption to less than 1 drink per day (1 drink = 5 oz of wine, 12 oz of beer or 1.5 oz of 80 proof liquor).  2.  Exercise per American Cancer Society guidelines of at least 150 minutes of moderate intensity activity or 75 minutes of vigorous activity each week.  (Or a combination of both.)  Exercise should be spread out over the week.  Regular recreational exercise has been shown to reduce the risk of cancer by 30%.  3.  Maintain a healthy weight with a body mass index between 19-24.9.  A postmenopausal BMI of 30.7 has been shown to have increased the risk for breast cancer by 37% in some studies.    4.  Do not use tobacco products and limit exposure to passive smoke.  5.  Breast-feed if possible.  Research shows that 16+ months of breast-feeding, over a lifetime, can reduce a woman's risk of breast cancer by up to 27%.  (Data also from: Betty ROWE.,  Melba P.  Estrogen and the risk of breast cancer.  [place] Journal of Medicine 2001; 344: 276.).

## 2024-02-06 NOTE — NURSING NOTE
"Oncology Rooming Note    February 6, 2024 9:13 AM   Vandana Jaeger is a 63 year old female who presents for:    Chief Complaint   Patient presents with    Oncology Clinic Visit     Follow up Ductal carcinoma in situ (DCIS) of right breast       Initial Vitals: There were no vitals taken for this visit. Estimated body mass index is 47.89 kg/m  as calculated from the following:    Height as of 12/4/23: 1.676 m (5' 6\").    Weight as of 12/4/23: 134.6 kg (296 lb 11.2 oz). There is no height or weight on file to calculate BSA.  Severe Pain (6) Comment: Data Unavailable   No LMP recorded. Patient is postmenopausal.  Allergies reviewed: Yes  Medications reviewed: Yes    Medications: Medication refills not needed today.  Pharmacy name entered into [facility]: [facility] - [place], [place] - [address] [place] [address] AT [facility]    Frailty Screening:   Is the patient here for a new oncology consult visit in cancer care? 2. No            Darling Bain LPN             "

## 2024-03-15 ENCOUNTER — TELEPHONE (OUTPATIENT)
Dept: ONCOLOGY | Facility: OTHER | Age: 64
End: 2024-03-15

## 2024-03-15 NOTE — TELEPHONE ENCOUNTER
Patient called states she is having Her breast MRI at [facility] on April 15, 2024 in [place]. She is requesting they do the scan under sedation as oral medication does not help her last time.   I called [facility] in [place] and they state it can be done under sedation they would need us to fax order with it stating patient requesting IV sedation and the date she is scheduled on 4/15/2024. Ra states they do not have their schedule for April done for IV sedation at this time, but we can fax the order to them and the patient can reach out to them in the beginning of April.  I printed order and will add notes that patient want IV sedation when she has her MRI on 4/15/2024 and they will reach out to her once they have the sedation schedule for April.  Phone [phone]  Fax [phone]  We will fax order to [facility].  Patient notified via phone they will contact her when it closer to April and I did give the patient the phone number to [facility] if she needs to reach out to them in April. Patient was very appreciative.  Darling Bain LPN

## 2024-04-03 ENCOUNTER — TELEPHONE (OUTPATIENT)
Dept: ONCOLOGY | Facility: OTHER | Age: 64
End: 2024-04-03

## 2024-04-03 NOTE — TELEPHONE ENCOUNTER
[facility] called and states they are not able to do IV Sedation due to not having available staff. They state they have very limited space for IV sedation. And it may be possible in 2-3 months but they are unable to schedule this soon as they are limited spots and staff availability and do not schedule out till it is closer.   I called and spoke with the patient and let her know it may be available in 2-3 months but they are unable confirm it totally for future IV sedation. I did let [facility] know she did want IV sedation as she states the last medication prior to MRI did not work. I let the patient know that per [facility] it would need to be done at [facility] to better accommodate her needs.They were unable to do locally due to the size of the MRI Machine previously per patient. I asked patient to also reach out directly to [facility] to ensure she personally verifies that they can do IV Sedation in future, to ease her mind. [facility] phone [phone].    Patient called and returned a call to our PAC [facility] for me to call her back.     I returned patient call and she states that [facility] told her the same thing that they may be able to do it in 2-3 months and at this time can not schedule until it is closer to availability. Patient states she wants to wait for IV sedation as oral did not work. I explained to the patient last time Valium 5 mg tablet was ordered and [facility] is willing to order Valium 10 mg if she chooses to do oral. Patient again states she wants to wait and she will call them every month and let us know and if they can't do it in the future she will try increased dose of valium. She will keep us updated and if she needs Valium she will call us. She states she gets rides and has dogs so only wants to go there once. I let her know [facility] was updated.  Darling Bain LPN

## 2024-04-15 ENCOUNTER — APPOINTMENT (OUTPATIENT)
Dept: CT IMAGING | Facility: HOSPITAL | Age: 64
End: 2024-04-15
Attending: FAMILY MEDICINE
Payer: MEDICARE

## 2024-04-15 ENCOUNTER — HOSPITAL ENCOUNTER (EMERGENCY)
Facility: HOSPITAL | Age: 64
Discharge: HOME | End: 2024-04-15
Attending: FAMILY MEDICINE | Admitting: FAMILY MEDICINE
Payer: MEDICARE

## 2024-04-15 VITALS
BODY MASS INDEX: 47.09 KG/M2 | SYSTOLIC BLOOD PRESSURE: 119 MMHG | WEIGHT: 291.78 LBS | RESPIRATION RATE: 18 BRPM | TEMPERATURE: 97.3 F | HEART RATE: 85 BPM | DIASTOLIC BLOOD PRESSURE: 102 MMHG | OXYGEN SATURATION: 96 %

## 2024-04-15 DIAGNOSIS — K52.9 COLITIS: ICD-10-CM

## 2024-04-15 DIAGNOSIS — K62.5 RECTAL BLEEDING: ICD-10-CM

## 2024-04-15 LAB
ABO/RH(D): NORMAL
ALBUMIN SERPL BCG-MCNC: 4.5 G/DL (ref 3.5–5.2)
ALP SERPL-CCNC: 67 U/L (ref 40–150)
ALT SERPL W P-5'-P-CCNC: 23 U/L (ref 0–50)
ANION GAP SERPL CALCULATED.3IONS-SCNC: 15 MMOL/L (ref 7–15)
ANTIBODY SCREEN: NEGATIVE
APTT PPP: 25 SECONDS (ref 22–38)
AST SERPL W P-5'-P-CCNC: 21 U/L (ref 0–45)
BASOPHILS # BLD AUTO: 0.1 10E3/UL (ref 0–0.2)
BASOPHILS NFR BLD AUTO: 1 %
BILIRUB DIRECT SERPL-MCNC: <0.2 MG/DL (ref 0–0.3)
BILIRUB SERPL-MCNC: 0.6 MG/DL
BUN SERPL-MCNC: 23 MG/DL (ref 8–23)
CALCIUM SERPL-MCNC: 10.2 MG/DL (ref 8.8–10.2)
CHLORIDE SERPL-SCNC: 94 MMOL/L (ref 98–107)
CREAT SERPL-MCNC: 0.91 MG/DL (ref 0.51–0.95)
CRP SERPL-MCNC: 6.73 MG/L
DEPRECATED HCO3 PLAS-SCNC: 26 MMOL/L (ref 22–29)
EGFRCR SERPLBLD CKD-EPI 2021: 70 ML/MIN/1.73M2
EOSINOPHIL # BLD AUTO: 0.1 10E3/UL (ref 0–0.7)
EOSINOPHIL NFR BLD AUTO: 1 %
ERYTHROCYTE [DISTWIDTH] IN BLOOD BY AUTOMATED COUNT: 12.9 % (ref 10–15)
GLUCOSE SERPL-MCNC: 121 MG/DL (ref 70–99)
HCT VFR BLD AUTO: 38 % (ref 35–47)
HGB BLD-MCNC: 13.2 G/DL (ref 11.7–15.7)
HOLD SPECIMEN: NORMAL
IMM GRANULOCYTES # BLD: 0 10E3/UL
IMM GRANULOCYTES NFR BLD: 0 %
INR PPP: 1 (ref 0.85–1.15)
LACTATE SERPL-SCNC: 2 MMOL/L (ref 0.7–2)
LIPASE SERPL-CCNC: 27 U/L (ref 13–60)
LYMPHOCYTES # BLD AUTO: 2.3 10E3/UL (ref 0.8–5.3)
LYMPHOCYTES NFR BLD AUTO: 22 %
MCH RBC QN AUTO: 32 PG (ref 26.5–33)
MCHC RBC AUTO-ENTMCNC: 34.7 G/DL (ref 31.5–36.5)
MCV RBC AUTO: 92 FL (ref 78–100)
MONOCYTES # BLD AUTO: 0.9 10E3/UL (ref 0–1.3)
MONOCYTES NFR BLD AUTO: 9 %
NEUTROPHILS # BLD AUTO: 7.1 10E3/UL (ref 1.6–8.3)
NEUTROPHILS NFR BLD AUTO: 67 %
NRBC # BLD AUTO: 0 10E3/UL
NRBC BLD AUTO-RTO: 0 /100
PLATELET # BLD AUTO: 323 10E3/UL (ref 150–450)
POTASSIUM SERPL-SCNC: 4.1 MMOL/L (ref 3.4–5.3)
PROCALCITONIN SERPL IA-MCNC: 0.03 NG/ML
PROT SERPL-MCNC: 8.3 G/DL (ref 6.4–8.3)
RBC # BLD AUTO: 4.13 10E6/UL (ref 3.8–5.2)
SODIUM SERPL-SCNC: 135 MMOL/L (ref 135–145)
SPECIMEN EXPIRATION DATE: NORMAL
WBC # BLD AUTO: 10.5 10E3/UL (ref 4–11)

## 2024-04-15 PROCEDURE — 96365 THER/PROPH/DIAG IV INF INIT: CPT | Mod: XU

## 2024-04-15 PROCEDURE — 99284 EMERGENCY DEPT VISIT MOD MDM: CPT | Performed by: FAMILY MEDICINE

## 2024-04-15 PROCEDURE — 96361 HYDRATE IV INFUSION ADD-ON: CPT

## 2024-04-15 PROCEDURE — 83690 ASSAY OF LIPASE: CPT | Performed by: FAMILY MEDICINE

## 2024-04-15 PROCEDURE — 250N000011 HC RX IP 250 OP 636: Performed by: RADIOLOGY

## 2024-04-15 PROCEDURE — 258N000003 HC RX IP 258 OP 636: Performed by: FAMILY MEDICINE

## 2024-04-15 PROCEDURE — 82248 BILIRUBIN DIRECT: CPT | Performed by: FAMILY MEDICINE

## 2024-04-15 PROCEDURE — 85610 PROTHROMBIN TIME: CPT | Performed by: FAMILY MEDICINE

## 2024-04-15 PROCEDURE — 74177 CT ABD & PELVIS W/CONTRAST: CPT

## 2024-04-15 PROCEDURE — 96367 TX/PROPH/DG ADDL SEQ IV INF: CPT

## 2024-04-15 PROCEDURE — 82374 ASSAY BLOOD CARBON DIOXIDE: CPT | Performed by: FAMILY MEDICINE

## 2024-04-15 PROCEDURE — 99285 EMERGENCY DEPT VISIT HI MDM: CPT | Mod: 25

## 2024-04-15 PROCEDURE — 83605 ASSAY OF LACTIC ACID: CPT | Performed by: FAMILY MEDICINE

## 2024-04-15 PROCEDURE — 96375 TX/PRO/DX INJ NEW DRUG ADDON: CPT

## 2024-04-15 PROCEDURE — 86900 BLOOD TYPING SEROLOGIC ABO: CPT | Performed by: FAMILY MEDICINE

## 2024-04-15 PROCEDURE — 86140 C-REACTIVE PROTEIN: CPT | Performed by: FAMILY MEDICINE

## 2024-04-15 PROCEDURE — 250N000011 HC RX IP 250 OP 636: Performed by: FAMILY MEDICINE

## 2024-04-15 PROCEDURE — 250N000012 HC RX MED GY IP 250 OP 636 PS 637: Performed by: FAMILY MEDICINE

## 2024-04-15 PROCEDURE — 85041 AUTOMATED RBC COUNT: CPT | Performed by: FAMILY MEDICINE

## 2024-04-15 PROCEDURE — 36415 COLL VENOUS BLD VENIPUNCTURE: CPT | Performed by: FAMILY MEDICINE

## 2024-04-15 PROCEDURE — 85730 THROMBOPLASTIN TIME PARTIAL: CPT | Performed by: FAMILY MEDICINE

## 2024-04-15 PROCEDURE — 84145 PROCALCITONIN (PCT): CPT | Performed by: FAMILY MEDICINE

## 2024-04-15 RX ORDER — ONDANSETRON 2 MG/ML
4 INJECTION INTRAMUSCULAR; INTRAVENOUS EVERY 30 MIN PRN
Status: DISCONTINUED | OUTPATIENT
Start: 2024-04-15 | End: 2024-04-15 | Stop reason: HOSPADM

## 2024-04-15 RX ORDER — CIPROFLOXACIN 500 MG/1
500 TABLET, FILM COATED ORAL 2 TIMES DAILY
Qty: 20 TABLET | Refills: 0 | Status: SHIPPED | OUTPATIENT
Start: 2024-04-15 | End: 2024-04-25

## 2024-04-15 RX ORDER — HYDROMORPHONE HYDROCHLORIDE 1 MG/ML
0.5 INJECTION, SOLUTION INTRAMUSCULAR; INTRAVENOUS; SUBCUTANEOUS EVERY 30 MIN PRN
Status: DISCONTINUED | OUTPATIENT
Start: 2024-04-15 | End: 2024-04-15 | Stop reason: HOSPADM

## 2024-04-15 RX ORDER — IOPAMIDOL 755 MG/ML
143 INJECTION, SOLUTION INTRAVASCULAR ONCE
Status: COMPLETED | OUTPATIENT
Start: 2024-04-15 | End: 2024-04-15

## 2024-04-15 RX ORDER — PREDNISONE 20 MG/1
40 TABLET ORAL ONCE
Status: COMPLETED | OUTPATIENT
Start: 2024-04-15 | End: 2024-04-15

## 2024-04-15 RX ORDER — CIPROFLOXACIN 2 MG/ML
400 INJECTION, SOLUTION INTRAVENOUS ONCE
Status: COMPLETED | OUTPATIENT
Start: 2024-04-15 | End: 2024-04-15

## 2024-04-15 RX ORDER — METRONIDAZOLE 500 MG/100ML
500 INJECTION, SOLUTION INTRAVENOUS ONCE
Status: COMPLETED | OUTPATIENT
Start: 2024-04-15 | End: 2024-04-15

## 2024-04-15 RX ORDER — METRONIDAZOLE 500 MG/1
500 TABLET ORAL 3 TIMES DAILY
Qty: 30 TABLET | Refills: 0 | Status: SHIPPED | OUTPATIENT
Start: 2024-04-15 | End: 2024-04-25

## 2024-04-15 RX ADMIN — HYDROMORPHONE HYDROCHLORIDE 0.5 MG: 1 INJECTION, SOLUTION INTRAMUSCULAR; INTRAVENOUS; SUBCUTANEOUS at 13:59

## 2024-04-15 RX ADMIN — PREDNISONE 40 MG: 20 TABLET ORAL at 15:48

## 2024-04-15 RX ADMIN — IOPAMIDOL 143 ML: 755 INJECTION, SOLUTION INTRAVENOUS at 15:07

## 2024-04-15 RX ADMIN — CIPROFLOXACIN 400 MG: 400 INJECTION, SOLUTION INTRAVENOUS at 15:45

## 2024-04-15 RX ADMIN — METRONIDAZOLE 500 MG: 500 INJECTION, SOLUTION INTRAVENOUS at 16:52

## 2024-04-15 RX ADMIN — SODIUM CHLORIDE 1000 ML: 9 INJECTION, SOLUTION INTRAVENOUS at 13:59

## 2024-04-15 RX ADMIN — ONDANSETRON 4 MG: 2 INJECTION INTRAMUSCULAR; INTRAVENOUS at 13:59

## 2024-04-15 ASSESSMENT — COLUMBIA-SUICIDE SEVERITY RATING SCALE - C-SSRS
1. IN THE PAST MONTH, HAVE YOU WISHED YOU WERE DEAD OR WISHED YOU COULD GO TO SLEEP AND NOT WAKE UP?: NO
6. HAVE YOU EVER DONE ANYTHING, STARTED TO DO ANYTHING, OR PREPARED TO DO ANYTHING TO END YOUR LIFE?: NO
2. HAVE YOU ACTUALLY HAD ANY THOUGHTS OF KILLING YOURSELF IN THE PAST MONTH?: NO

## 2024-04-15 ASSESSMENT — ACTIVITIES OF DAILY LIVING (ADL)
ADLS_ACUITY_SCORE: 35

## 2024-04-15 NOTE — DISCHARGE INSTRUCTIONS
Rest and stay well-hydrated  Clear liquid diet today then advance as tolerated  Follow-up with your primary provider in a day or 2 for reevaluation and further workup and further management and likely colonoscopy  Come back for any concern or any worsening symptoms

## 2024-04-15 NOTE — ED NOTES
Pt presents ambulatory c/o lower abd cramping and bloody stools x2 days. Pt states cramping started, and then noticed bright red blood in stools. Reports stools have been soft, less than a cup of blood noted. x3 Bms since pain started. Abd nontender with palpation.

## 2024-04-15 NOTE — ED NOTES
Patient reports pain has decreased since medication administration. Updated on plan for CT, declines needs.

## 2024-04-15 NOTE — ED PROVIDER NOTES
History     Chief Complaint   Patient presents with    Rectal Bleeding     HPI  Vandana Jaeger is a 64 year old female who presented to the ER with a chief complaint of lower abdominal pain, cramping and bloody stools since yesterday.  She had 3 bowel movement, well-formed with some  Bright red blood.  Otherwise denies any fever or chills.  Denies any blood in the urine.  Denies any vaginal bleeding.  Denies any chest pain or shortness of breath or dizziness..  Denies any cough sore throat or nasal congestion.  Denies any fever or chills.  Never had blood in the stool before.  Last colonoscopy was a year ago and had 1 polyp just was fine    Allergies:  Allergies   Allergen Reactions    Adhesive Tape      Steri strips left scar.    Latex      Possible allergy.       Problem List:    Patient Active Problem List    Diagnosis Date Noted    Prediabetes 02/01/2022     Priority: Medium     Formatting of this note is different from the original.  Patient met criteria for the diabetes prevention program for pre-diabetes patients.  Problem  prediabetes  added to patient's list. Reviewed and approved by [facility] in Spring 2021. Eligibility criteria: https://www.cdc.gov/diabetes/prevention/program-eligibility.html  Please contact Dr. Hammond with any concerns.      Age-related nuclear cataract, bilateral 12/14/2021     Priority: Medium    Cupping of optic disc, right 12/14/2021     Priority: Medium    Chronic fatigue 10/13/2021     Priority: Medium    Impaired gait and mobility 08/16/2021     Priority: Medium    Impairment of balance 08/16/2021     Priority: Medium    Right knee pain 08/16/2021     Priority: Medium    Hx of heart artery stent 04/06/2021     Priority: Medium    ASCVD (arteriosclerotic cardiovascular disease) 04/05/2021     Priority: Medium    History of COVID-19 01/11/2021     Priority: Medium    Urinary, incontinence, stress female 01/11/2021     Priority: Medium    Recurrent major depressive disorder  (H24) 05/21/2019     Priority: Medium    Use of tamoxifen (Nolvadex) 02/20/2018     Priority: Medium    ACP (advance care planning) 08/09/2016     Priority: Medium     Advance Care Planning 8/9/2016: ACP Review of Chart / Resources Provided:  Reviewed chart for advance care plan.  Vandana Jaeger has been provided information and resources to begin or update their advance care plan.  Added by Alisson Whiting            Vitamin D deficiency 01/26/2016     Priority: Medium    Essential hypertension 11/24/2015     Priority: Medium    Disorder of refraction and accommodation 04/02/2015     Priority: Medium     Formatting of this note might be different from the original.  IMO Update      Lesion of left upper eyelid 04/02/2015     Priority: Medium    Reactive airway disease that is not asthma      Priority: Medium     Replacing diagnoses that were inactivated after the 10/1/2021 regulatory import.      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  Goal weight xxx     BMR 1983    Calorie goal: 1700,  Carb goal: 50 net, Protein goal:     Initial visit 06/05/18  Initial Weight: (!) 138.6 kg (305 lb 8.9 oz)    Initial Body mass index is 49.32 kg/m .  Goal weight 150#    BMR 1983    Calorie goal: 1700,  Carb goal: 50 net, Protein goal:       Chondromalacia of patella 08/02/2010     Priority: Medium     Formatting of this note might be different from the original.  IMO Update 10/11      Medial meniscus tear 08/02/2010     Priority: Medium      Formatting of this note might be different from the original.  IMO Update 10/11          Past Medical History:    Past Medical History:   Diagnosis Date    Anxiety state, unspecified     Arthritis     ASCVD (arteriosclerotic cardiovascular disease) 04/05/2021    Benign essential hypertension 11/24/2015    Breast cancer (H) 02/13/2014    Contact dermatitis and other eczema, due to unspecified cause     Coronary artery disease     Depressive disorder     Ductal carcinoma in situ of right breast 01/28/2014    Ductal carcinoma in situ of right breast 01/28/2014    H/O herpes zoster     History of COVID-19 01/11/2021    Hx of heart artery stent 04/06/2021    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 01/28/2014    Morbid obesity (H) 01/28/2014    Other B-complex deficiencies 08/19/2014    Other specified disease of hair and hair follicles     Primary central sleep apnea 01/26/2016    Reactive airway disease that is not asthma     Recurrent major depressive disorder (H24) 05/21/2019    Uncomplicated asthma     Unspecified immunity deficiency     Urinary, incontinence, stress female 01/11/2021    Vitamin D deficiency 01/26/2016       Past Surgical History:    Past Surgical History:   Procedure Laterality Date    BIOPSY      right breast--neg    BIOPSY BREAST Right 02/13/2014    DCIS, LOBULAR CARCINOMA    BIOPSY BREAST NEEDLE LOCALIZATION  2/13/2014    Procedure: BIOPSY BREAST NEEDLE LOCALIZATION;  WIRE LOCALIZED SEGMENTAL RESECTION, RIGHT BREAST;  Surgeon: Shanika Beaver MD;  Location: [facility]    CARDIAC SURGERY      stent    CHOLECYSTECTOMY  2010    COLONOSCOPY  2009    COLONOSCOPY  11/12/2013    DILATION AND CURETTAGE, OPERATIVE HYSTEROSCOPY, COMBINED  03/2017    [facility]    LUMPECTOMY BREAST Right 02/13/2014    DCIS, LOBULAR CARCINOMA    ORTHOPEDIC SURGERY      heel surgery    ORTHOPEDIC SURGERY      rt knee meniiscus tear       Family History:    Family History   Problem Relation Age of Onset    Hypertension Mother      Cerebrovascular Disease Mother     Breast Cancer Mother 55         age 71 left mastectomy    C.A.D. Father     Coronary Artery Disease Father     Cerebrovascular Disease Father     Prostate Cancer Father     Thyroid Disease Sister     Breast Cancer Maternal Aunt     Thyroid Disease Other     Diabetes No family hx of     Asthma No family hx of     Hyperlipidemia No family hx of     Colon Cancer No family hx of     Other Cancer No family hx of     Anesthesia Reaction No family hx of     Genetic Disorder No family hx of        Social History:  Marital Status:  Single [1]  Social History     Tobacco Use    Smoking status: Former     Current packs/day: 0.00     Average packs/day: 0.1 packs/day for 38.5 years (3.9 ttl pk-yrs)     Types: Cigarettes     Start date: 8/15/1974     Quit date: 3/3/2013     Years since quittin.1    Smokeless tobacco: Never   Vaping Use    Vaping status: Never Used   Substance Use Topics    Alcohol use: Yes    Drug use: No        Medications:    ciprofloxacin (CIPRO) 500 MG tablet  metroNIDAZOLE (FLAGYL) 500 MG tablet  acetaminophen (TYLENOL) 650 MG CR tablet  albuterol (PROAIR HFA) 108 (90 Base) MCG/ACT inhaler  amLODIPine (NORVASC) 10 MG tablet  aspirin (ASA) 81 MG EC tablet  atorvastatin (LIPITOR) 40 MG tablet  calcium-vitamin D (CALTRATE) 600-400 MG-UNIT per tablet  carvedilol (COREG) 12.5 MG tablet  escitalopram (LEXAPRO) 10 MG tablet  escitalopram (LEXAPRO) 20 MG tablet  furosemide (LASIX) 80 MG tablet  lisinopril (ZESTRIL) 40 MG tablet  magnesium oxide 400 MG CAPS  metoprolol succinate ER (TOPROL XL) 25 MG 24 hr tablet  montelukast (SINGULAIR) 10 MG tablet  Multiple Vitamin (DAILY MULTIVITAMIN PO)  nitroGLYcerin (NITROSTAT) 0.4 MG sublingual tablet  nystatin (MYCOSTATIN) 269957 UNIT/GM external powder  Omega-3 Fatty Acids (OMEGA 3 PO)  potassium chloride ER (KLOR-CON M) 20 MEQ CR tablet  spironolactone (ALDACTONE) 25 MG tablet  vitamin B-12 (CYANOCOBALAMIN) 1000 MCG  tablet  VITAMIN D, CHOLECALCIFEROL, PO          Review of Systems   All other systems reviewed and are negative.      Physical Exam   BP: 140/82  Pulse: 94  Temp: 98.4  F (36.9  C)  Resp: 20  Weight: 132.3 kg (291 lb 12.5 oz)  SpO2: 99 %      Physical Exam  Constitutional:       General: She is not in acute distress.     Appearance: Normal appearance. She is well-developed. She is not ill-appearing, toxic-appearing or diaphoretic.   HENT:      Head: Normocephalic and atraumatic.      Nose: Nose normal. No congestion or rhinorrhea.      Mouth/Throat:      Pharynx: No oropharyngeal exudate or posterior oropharyngeal erythema.   Eyes:      General: No scleral icterus.        Right eye: No discharge.         Left eye: No discharge.      Extraocular Movements: Extraocular movements intact.      Conjunctiva/sclera: Conjunctivae normal.      Pupils: Pupils are equal, round, and reactive to light.   Neck:      Vascular: No carotid bruit.   Cardiovascular:      Rate and Rhythm: Normal rate and regular rhythm.      Heart sounds: Normal heart sounds.   Pulmonary:      Effort: No respiratory distress.      Breath sounds: Normal breath sounds. No stridor. No wheezing, rhonchi or rales.   Chest:      Chest wall: No tenderness.   Abdominal:      General: Bowel sounds are normal. There is no distension.      Palpations: Abdomen is soft. There is no mass.      Tenderness: There is no abdominal tenderness. There is no right CVA tenderness, left CVA tenderness, guarding or rebound.      Hernia: No hernia is present.   Musculoskeletal:         General: No swelling, tenderness, deformity or signs of injury.      Cervical back: Normal range of motion and neck supple. No rigidity or tenderness.      Right lower leg: No edema.      Left lower leg: No edema.   Lymphadenopathy:      Cervical: No cervical adenopathy.   Skin:     General: Skin is warm and dry.      Coloration: Skin is not jaundiced or pale.      Findings: No bruising,  erythema, lesion or rash.   Neurological:      General: No focal deficit present.      Mental Status: She is alert and oriented to person, place, and time. Mental status is at baseline.      Cranial Nerves: No cranial nerve deficit.      Sensory: No sensory deficit.      Motor: No weakness.      Coordination: Coordination normal.      Gait: Gait normal.      Deep Tendon Reflexes: Reflexes normal.         ED Course      Patient was seen and examined shortly after arrival.  Stable.  Given 1 L normal saline bolus.  Lab and imaging reviewed.  CT shows possible early colitis.  Infectious versus inflammatory.  Given 40 mg oral prednisone and started on Cipro and Flagyl.  Patient was offered admission for further management IV antibiotic and further workup.  Patient refused admission.  Given prescription for Cipro and Flagyl and advised to    Rest and stay well-hydrated  Clear liquid diet today then advance as tolerated  Follow-up with your primary provider in a day or 2 for reevaluation and further workup and further management and likely colonoscopy  Come back for any concern or any worsening symptoms  Patient agrees with plan be stable for discharge    Procedures                Results for orders placed or performed during the hospital encounter of 04/15/24 (from the past 24 hour(s))   [place] Draw    Narrative    The following orders were created for panel order [place] Draw.  Procedure                               Abnormality         Status                     ---------                               -----------         ------                     Extra Blue Top Tube[[id number]]                              Final result               Extra Red Top Tube[[id number]]                               Final result               Extra Green Top (Lithium...[[id number]]                      Final result               Extra Green Top (Lithium...[[id number]]                      Final result               Extra Purple Top  Tube[[id number]]                            Final result                 Please view results for these tests on the individual orders.   Extra Red Top Tube   Result Value Ref Range    Hold Specimen JIC    Extra Blue Top Tube   Result Value Ref Range    Hold Specimen JIC    Extra Green Top (Lithium Heparin) Tube   Result Value Ref Range    Hold Specimen JIC    Extra Green Top (Lithium Heparin) Tube   Result Value Ref Range    Hold Specimen JIC    Extra Purple Top Tube   Result Value Ref Range    Hold Specimen JIC    CBC with platelets differential    Narrative    The following orders were created for panel order CBC with platelets differential.  Procedure                               Abnormality         Status                     ---------                               -----------         ------                     CBC with platelets and d...[[id number]]                      Final result                 Please view results for these tests on the individual orders.   INR   Result Value Ref Range    INR 1.00 0.85 - 1.15   Partial thromboplastin time   Result Value Ref Range    aPTT 25 22 - 38 Seconds   ABO/Rh type and screen    Narrative    The following orders were created for panel order ABO/Rh type and screen.  Procedure                               Abnormality         Status                     ---------                               -----------         ------                     Adult Type and Screen[[id number]]                            Final result                 Please view results for these tests on the individual orders.   Basic metabolic panel   Result Value Ref Range    Sodium 135 135 - 145 mmol/L    Potassium 4.1 3.4 - 5.3 mmol/L    Chloride 94 (L) 98 - 107 mmol/L    Carbon Dioxide (CO2) 26 22 - 29 mmol/L    Anion Gap 15 7 - 15 mmol/L    Urea Nitrogen 23.0 8.0 - 23.0 mg/dL    Creatinine 0.91 0.51 - 0.95 mg/dL    GFR Estimate 70 >60 mL/min/1.73m2    Calcium 10.2 8.8 - 10.2 mg/dL    Glucose 121 (H) 70 - 99  mg/dL   Hepatic function panel   Result Value Ref Range    Protein Total 8.3 6.4 - 8.3 g/dL    Albumin 4.5 3.5 - 5.2 g/dL    Bilirubin Total 0.6 <=1.2 mg/dL    Alkaline Phosphatase 67 40 - 150 U/L    AST 21 0 - 45 U/L    ALT 23 0 - 50 U/L    Bilirubin Direct <0.20 0.00 - 0.30 mg/dL   Lipase   Result Value Ref Range    Lipase 27 13 - 60 U/L   Lactic acid whole blood   Result Value Ref Range    Lactic Acid 2.0 0.7 - 2.0 mmol/L   Procalcitonin   Result Value Ref Range    Procalcitonin 0.03 <0.50 ng/mL   CRP inflammation   Result Value Ref Range    CRP Inflammation 6.73 (H) <5.00 mg/L   CBC with platelets and differential   Result Value Ref Range    WBC Count 10.5 4.0 - 11.0 10e3/uL    RBC Count 4.13 3.80 - 5.20 10e6/uL    Hemoglobin 13.2 11.7 - 15.7 g/dL    Hematocrit 38.0 35.0 - 47.0 %    MCV 92 78 - 100 fL    MCH 32.0 26.5 - 33.0 pg    MCHC 34.7 31.5 - 36.5 g/dL    RDW 12.9 10.0 - 15.0 %    Platelet Count 323 150 - 450 10e3/uL    % Neutrophils 67 %    % Lymphocytes 22 %    % Monocytes 9 %    % Eosinophils 1 %    % Basophils 1 %    % Immature Granulocytes 0 %    NRBCs per 100 WBC 0 <1 /100    Absolute Neutrophils 7.1 1.6 - 8.3 10e3/uL    Absolute Lymphocytes 2.3 0.8 - 5.3 10e3/uL    Absolute Monocytes 0.9 0.0 - 1.3 10e3/uL    Absolute Eosinophils 0.1 0.0 - 0.7 10e3/uL    Absolute Basophils 0.1 0.0 - 0.2 10e3/uL    Absolute Immature Granulocytes 0.0 <=0.4 10e3/uL    Absolute NRBCs 0.0 10e3/uL   Adult Type and Screen   Result Value Ref Range    ABO/RH(D) AB NEG     Antibody Screen Negative Negative    SPECIMEN EXPIRATION DATE [id number]    CT Abdomen Pelvis w Contrast    Narrative    EXAMINATION: CT ABDOMEN PELVIS W CONTRAST, 4/15/2024 3:19 PM    TECHNIQUE:  Helical CT images from the lung bases through the  symphysis pubis were obtained  with IV contrast. Contrast dose: Isovue  370 143ml Given    COMPARISON: none    HISTORY: abd pain, rectal bleeding    FINDINGS:    There is dependent atelectasis at the lung  bases.    The liver is free of masses or biliary ductal enlargement. The  gallbladder has been removed    The the spleen and pancreas appear normal.    The adrenal glands are normal.    The right and left kidneys are free of masses or hydronephrosis.    The periaortic lymph nodes are normal in caliber. There is a  retroaortic left renal vein which is a normal variant    There is some mild bowel wall thickening in the mid descending colon  with slight pericolonic edema. This raises the possibility of mild  colitis.    In the pelvis the bladder and rectum appear normal.    Advanced degenerative changes are present in the thoracic and lumbar  spine with spondylolisthesis of L4 on L5.      Impression    IMPRESSION: Questionable mild bowel wall thickening with surrounding  edema in the mid descending colon suspicious for early colitis.     RHONDA LINARES MD         SYSTEM ID:  [id number]       Medications   ondansetron (ZOFRAN) injection 4 mg (4 mg Intravenous $Given 4/15/24 1359)   HYDROmorphone (PF) (DILAUDID) injection 0.5 mg (0.5 mg Intravenous $Given 4/15/24 1359)   metroNIDAZOLE (FLAGYL) infusion 500 mg (500 mg Intravenous $New Bag 4/15/24 1652)   sodium chloride 0.9% BOLUS 1,000 mL (0 mLs Intravenous Stopped 4/15/24 1550)   iopamidol (ISOVUE-370) solution 143 mL (143 mLs Intravenous $Given 4/15/24 1507)   sodium chloride (PF) 0.9% PF flush 50 mL (50 mLs Intravenous $Given 4/15/24 1507)   ciprofloxacin (CIPRO) infusion 400 mg (0 mg Intravenous Stopped 4/15/24 1653)   predniSONE (DELTASONE) tablet 40 mg (40 mg Oral $Given 4/15/24 1548)       Assessments & Plan (with Medical Decision Making)     I have reviewed the nursing notes.    I have reviewed the findings, diagnosis, plan and need for follow up with the patient.          New Prescriptions    CIPROFLOXACIN (CIPRO) 500 MG TABLET    Take 1 tablet (500 mg) by mouth 2 times daily for 10 days    METRONIDAZOLE (FLAGYL) 500 MG TABLET    Take 1 tablet (500 mg) by mouth  3 times daily for 10 days       Final diagnoses:   Colitis   Rectal bleeding       4/15/2024   [facility]       Ida Horton MD  04/15/24 [id number]       Ida Horton MD  04/15/24 1398

## 2024-04-15 NOTE — ED TRIAGE NOTES
"Pt reports lower abdominal cramping and pain for the past 2 days along with bloody stools. Pt reports bright red blood in the stool. Pt denies chest pain and SOB. Her current SOB is her \"normal.\" Pt states some dizziness when sitting on the toilet for to long.         "

## 2024-04-16 ENCOUNTER — TELEPHONE (OUTPATIENT)
Dept: FAMILY MEDICINE | Facility: OTHER | Age: 64
End: 2024-04-16

## 2024-04-16 NOTE — TELEPHONE ENCOUNTER
Pt called looking to get scheduled with PCP for a follow-up ER visit from 04/15/2024.  Per follow-up recommendation pt to be seen for follow-up with PCP in 2 days.  Pt seen for rectal bleeding and diagnosed with colitis and rectal bleeding. Pt started on Cipro and Flagyl and reports starting them both.  Pt reports that the symptoms are the same as when seen in the ED.     Pt scheduled with PCP.    Pt encourage with any new or worsening symptoms that she would need to go to the ED to get reevaluated.  Pt reported understanding.

## 2024-04-17 ENCOUNTER — OFFICE VISIT (OUTPATIENT)
Dept: FAMILY MEDICINE | Facility: OTHER | Age: 64
End: 2024-04-17
Attending: NURSE PRACTITIONER
Payer: MEDICARE

## 2024-04-17 VITALS
HEART RATE: 85 BPM | BODY MASS INDEX: 47.79 KG/M2 | WEIGHT: 293 LBS | RESPIRATION RATE: 18 BRPM | SYSTOLIC BLOOD PRESSURE: 120 MMHG | TEMPERATURE: 98.6 F | OXYGEN SATURATION: 96 % | DIASTOLIC BLOOD PRESSURE: 79 MMHG

## 2024-04-17 DIAGNOSIS — K52.9 COLITIS: Primary | ICD-10-CM

## 2024-04-17 PROCEDURE — G2211 COMPLEX E/M VISIT ADD ON: HCPCS | Performed by: NURSE PRACTITIONER

## 2024-04-17 PROCEDURE — 99213 OFFICE O/P EST LOW 20 MIN: CPT | Performed by: NURSE PRACTITIONER

## 2024-04-17 PROCEDURE — G0463 HOSPITAL OUTPT CLINIC VISIT: HCPCS

## 2024-04-17 ASSESSMENT — PAIN SCALES - GENERAL: PAINLEVEL: MODERATE PAIN (4)

## 2024-04-17 NOTE — PROGRESS NOTES
Assessment & Plan         Colitis  - Adult GI  Referral - Consult Only; Future  - Complete your antibiotics prescribed in ER  - To ER with any increased symptoms  - Keep diet bland for now          MED REC REQUIRED{  Post Medication Reconciliation Status: discharge medications reconciled, continue medications without change          Jackie Hitchcock [facility]  [phone]           Vandana is a 64 year old, presenting for the following health issues:  Hospital F/U        ED/UC Followup:  Facility:  [facility]  Date of visit: 04/15/2024  Reason for visit: colitis; rectal bleeding  Current Status: Improving somewhat         Status:  Denies bloody stools  Denies Diarrhea  Denies constipation  Appetite is improved, she is following a very bland diet  Taking medication (Cipro / Flagyl) as prescribed  No fever at home  Drinking fluids well  Abdominal pain is improved          ER summary:    Vandana Jaeger is a 64 year old female who presented to the ER with a chief complaint of lower abdominal pain, cramping and bloody stools since yesterday.  She had 3 bowel movement, well-formed with some  Bright red blood.  Otherwise denies any fever or chills.  Denies any blood in the urine.  Denies any vaginal bleeding.  Denies any chest pain or shortness of breath or dizziness..  Denies any cough sore throat or nasal congestion.  Denies any fever or chills.  Never had blood in the stool before.  Last colonoscopy was a year ago and had 1 polyp just was fine        Patient was seen and examined shortly after arrival.  Stable.  Given 1 L normal saline bolus.  Lab and imaging reviewed.  CT shows possible early colitis.  Infectious versus inflammatory.  Given 40 mg oral prednisone and started on Cipro and Flagyl.  Patient was offered admission for further management IV antibiotic and further workup.  Patient refused admission.  Given prescription for Cipro and Flagyl and advised to         Rest and stay well-hydrated  Clear liquid  diet today then advance as tolerated  Follow-up with your primary provider in a day or 2 for reevaluation and further workup and further management and likely colonoscopy  Come back for any concern or any worsening symptoms  Patient agrees with plan be stable for discharge            INR 1.00 0.85 - 1.15    Partial thromboplastin time   Result Value Ref Range     aPTT 25 22 - 38 Seconds   ABO/Rh type and screen     Narrative     The following orders were created for panel order ABO/Rh type and screen.  Procedure                               Abnormality         Status                     ---------                               -----------         ------                     Adult Type and Screen[[id number]]                            Final result                  Please view results for these tests on the individual orders.   Basic metabolic panel   Result Value Ref Range     Sodium 135 135 - 145 mmol/L     Potassium 4.1 3.4 - 5.3 mmol/L     Chloride 94 (L) 98 - 107 mmol/L     Carbon Dioxide (CO2) 26 22 - 29 mmol/L     Anion Gap 15 7 - 15 mmol/L     Urea Nitrogen 23.0 8.0 - 23.0 mg/dL     Creatinine 0.91 0.51 - 0.95 mg/dL     GFR Estimate 70 >60 mL/min/1.73m2     Calcium 10.2 8.8 - 10.2 mg/dL     Glucose 121 (H) 70 - 99 mg/dL   Hepatic function panel   Result Value Ref Range     Protein Total 8.3 6.4 - 8.3 g/dL     Albumin 4.5 3.5 - 5.2 g/dL     Bilirubin Total 0.6 <=1.2 mg/dL     Alkaline Phosphatase 67 40 - 150 U/L     AST 21 0 - 45 U/L     ALT 23 0 - 50 U/L     Bilirubin Direct <0.20 0.00 - 0.30 mg/dL   Lipase   Result Value Ref Range     Lipase 27 13 - 60 U/L   Lactic acid whole blood   Result Value Ref Range     Lactic Acid 2.0 0.7 - 2.0 mmol/L   Procalcitonin   Result Value Ref Range     Procalcitonin 0.03 <0.50 ng/mL   CRP inflammation   Result Value Ref Range     CRP Inflammation 6.73 (H) <5.00 mg/L   CBC with platelets and differential   Result Value Ref Range     WBC Count 10.5 4.0 - 11.0 10e3/uL     RBC  Count 4.13 3.80 - 5.20 10e6/uL     Hemoglobin 13.2 11.7 - 15.7 g/dL     Hematocrit 38.0 35.0 - 47.0 %     MCV 92 78 - 100 fL     MCH 32.0 26.5 - 33.0 pg     MCHC 34.7 31.5 - 36.5 g/dL     RDW 12.9 10.0 - 15.0 %     Platelet Count 323 150 - 450 10e3/uL     % Neutrophils 67 %     % Lymphocytes 22 %     % Monocytes 9 %     % Eosinophils 1 %     % Basophils 1 %     % Immature Granulocytes 0 %     NRBCs per 100 WBC 0 <1 /100     Absolute Neutrophils 7.1 1.6 - 8.3 10e3/uL     Absolute Lymphocytes 2.3 0.8 - 5.3 10e3/uL     Absolute Monocytes 0.9 0.0 - 1.3 10e3/uL     Absolute Eosinophils 0.1 0.0 - 0.7 10e3/uL     Absolute Basophils 0.1 0.0 - 0.2 10e3/uL     Absolute Immature Granulocytes 0.0 <=0.4 10e3/uL     Absolute NRBCs 0.0 10e3/uL   Adult Type and Screen   Result Value Ref Range     ABO/RH(D) AB NEG       Antibody Screen Negative Negative     SPECIMEN EXPIRATION DATE [id number]     CT Abdomen Pelvis w Contrast     Narrative     EXAMINATION: CT ABDOMEN PELVIS W CONTRAST, 4/15/2024 3:19 PM     TECHNIQUE:  Helical CT images from the lung bases through the  symphysis pubis were obtained  with IV contrast. Contrast dose: Isovue  370 143ml Given     COMPARISON: none     HISTORY: abd pain, rectal bleeding     FINDINGS:     There is dependent atelectasis at the lung bases.     The liver is free of masses or biliary ductal enlargement. The  gallbladder has been removed     The the spleen and pancreas appear normal.     The adrenal glands are normal.     The right and left kidneys are free of masses or hydronephrosis.     The periaortic lymph nodes are normal in caliber. There is a  retroaortic left renal vein which is a normal variant     There is some mild bowel wall thickening in the mid descending colon  with slight pericolonic edema. This raises the possibility of mild  colitis.     In the pelvis the bladder and rectum appear normal.     Advanced degenerative changes are present in the thoracic and lumbar  spine with  spondylolisthesis of L4 on L5.        Impression     IMPRESSION: Questionable mild bowel wall thickening with surrounding  edema in the mid descending colon suspicious for early colitis.        Ida Horton MD           Review of Systems  Constitutional, neuro, ENT, endocrine, pulmonary, cardiac, gastrointestinal, genitourinary, musculoskeletal, integument and psychiatric systems are negative, except as otherwise noted.            Objective    /79 (BP Location: Right arm, Patient Position: Sitting, Cuff Size: Adult Large)   Pulse 85   Temp 98.6  F (37  C) (Tympanic)   Resp 18   Wt 134.3 kg (296 lb 1.6 oz)   SpO2 96%   Breastfeeding No   BMI 47.79 kg/m    Body mass index is 47.79 kg/m .        Physical Exam   GENERAL: alert and no distress  RESP: lungs clear to auscultation - no rales, rhonchi or wheezes  CV: regular rate and rhythm, normal S1 S2, no S3 or S4, no murmur, click or rub, no peripheral edema  ABDOMEN: diffuse mild tenderness  PSYCH: mentation appears normal, affect normal/bright        The longitudinal plan of care for the diagnosis(es)/condition(s) as documented were addressed during this visit. Due to the added complexity in care, I will continue to support Vandana in the subsequent management and with ongoing continuity of care.           Signed Electronically by: Jackie Hitchcock CNP

## 2024-04-18 DIAGNOSIS — F33.0 MILD EPISODE OF RECURRENT MAJOR DEPRESSIVE DISORDER (H): ICD-10-CM

## 2024-04-18 DIAGNOSIS — I10 BENIGN ESSENTIAL HYPERTENSION: ICD-10-CM

## 2024-04-18 DIAGNOSIS — J31.0 CHRONIC RHINITIS: ICD-10-CM

## 2024-04-18 DIAGNOSIS — I25.10 ASCVD (ARTERIOSCLEROTIC CARDIOVASCULAR DISEASE): ICD-10-CM

## 2024-04-18 NOTE — TELEPHONE ENCOUNTER
Escitalopram (Lexapro) 10 MG tablet    Last Written Prescription Date:  12/04/2023  Last Fill Quantity: 90,   # refills: 1  Last Office Visit: 04/17/2024      Escitalopram (Lexapro) 20 MG tablet   Last Written Prescription Date:  12/04/2023  Last Fill Quantity: 90,   # refills: 1        Lisinopril (Zestril) 40 MG tablet  Last Written Prescription Date:  01/19/2024  Last Fill Quantity: 90,   # refills: 0     Atorvastatin (Lipitor) 40 MG tablet      Last Written Prescription Date:  06/16/2023  Last Fill Quantity: 90,   # refills: 1     Montelukast (Singulair) 10 MG tablet     Last Written Prescription Date:  02/02/2024  Last Fill Quantity: 90,   # refills: 0

## 2024-04-19 RX ORDER — ESCITALOPRAM OXALATE 20 MG/1
TABLET ORAL
Qty: 90 TABLET | Refills: 1 | Status: SHIPPED | OUTPATIENT
Start: 2024-04-19

## 2024-04-19 RX ORDER — MONTELUKAST SODIUM 10 MG/1
1 TABLET ORAL AT BEDTIME
Qty: 90 TABLET | Refills: 0 | Status: SHIPPED | OUTPATIENT
Start: 2024-04-19 | End: 2024-07-19

## 2024-04-19 RX ORDER — ATORVASTATIN CALCIUM 40 MG/1
TABLET, FILM COATED ORAL
Qty: 90 TABLET | Refills: 1 | Status: SHIPPED | OUTPATIENT
Start: 2024-04-19

## 2024-04-19 RX ORDER — ESCITALOPRAM OXALATE 10 MG/1
TABLET ORAL
Qty: 90 TABLET | Refills: 1 | Status: SHIPPED | OUTPATIENT
Start: 2024-04-19

## 2024-04-19 RX ORDER — LISINOPRIL 40 MG/1
TABLET ORAL
Qty: 90 TABLET | Refills: 0 | Status: SHIPPED | OUTPATIENT
Start: 2024-04-19 | End: 2024-07-19

## 2024-06-18 ENCOUNTER — TELEPHONE (OUTPATIENT)
Dept: FAMILY MEDICINE | Facility: OTHER | Age: 64
End: 2024-06-18

## 2024-06-18 NOTE — TELEPHONE ENCOUNTER
3:45 PM    Reason for Call: OVERBOOK/PREOP    PREOP 6/25/24 left shoulder tear Dr Escamilla [facility] [place]     Patient is having left shoulder shoulder surgery with Dr Escamilla at [facility] on Tuesday 6/25/2024.    The patient is requesting an appointment for Preop with Jackie Hitchcock or any provider that has an opening.    Was an appointment offered for this call? No  If yes : Appointment type              Date    Preferred method for responding to this message: Telephone Call  What is your phone number ? [phone]    If we cannot reach you directly, may we leave a detailed response at the number you provided? Yes    Can this message wait until your PCP/provider returns, if unavailable today? NO

## 2024-06-20 ENCOUNTER — OFFICE VISIT (OUTPATIENT)
Dept: FAMILY MEDICINE | Facility: OTHER | Age: 64
End: 2024-06-20
Attending: NURSE PRACTITIONER
Payer: MEDICARE

## 2024-06-20 VITALS
DIASTOLIC BLOOD PRESSURE: 72 MMHG | HEART RATE: 76 BPM | RESPIRATION RATE: 21 BRPM | SYSTOLIC BLOOD PRESSURE: 120 MMHG | BODY MASS INDEX: 48.52 KG/M2 | OXYGEN SATURATION: 98 % | TEMPERATURE: 98.2 F | WEIGHT: 293 LBS

## 2024-06-20 DIAGNOSIS — R73.03 PREDIABETES: ICD-10-CM

## 2024-06-20 DIAGNOSIS — M67.912 DISORDER OF LEFT ROTATOR CUFF: ICD-10-CM

## 2024-06-20 DIAGNOSIS — I50.32 CHRONIC DIASTOLIC HEART FAILURE (H): ICD-10-CM

## 2024-06-20 DIAGNOSIS — I25.10 CORONARY ARTERY DISEASE INVOLVING NATIVE CORONARY ARTERY OF NATIVE HEART WITHOUT ANGINA PECTORIS: ICD-10-CM

## 2024-06-20 DIAGNOSIS — Z01.818 PRE-OP EXAM: Primary | ICD-10-CM

## 2024-06-20 DIAGNOSIS — I10 ESSENTIAL HYPERTENSION: ICD-10-CM

## 2024-06-20 DIAGNOSIS — E66.01 MORBID OBESITY WITH BODY MASS INDEX (BMI) OF 45.0 TO 49.9 IN ADULT (H): ICD-10-CM

## 2024-06-20 DIAGNOSIS — J98.9 REACTIVE AIRWAY DISEASE WITHOUT ASTHMA: ICD-10-CM

## 2024-06-20 LAB
ALBUMIN SERPL BCG-MCNC: 4.7 G/DL (ref 3.5–5.2)
ALP SERPL-CCNC: 77 U/L (ref 40–150)
ALT SERPL W P-5'-P-CCNC: 26 U/L (ref 0–50)
ANION GAP SERPL CALCULATED.3IONS-SCNC: 17 MMOL/L (ref 7–15)
AST SERPL W P-5'-P-CCNC: 19 U/L (ref 0–45)
ATRIAL RATE - MUSE: 72 BPM
BASOPHILS # BLD AUTO: 0 10E3/UL (ref 0–0.2)
BASOPHILS NFR BLD AUTO: 0 %
BILIRUB SERPL-MCNC: 0.6 MG/DL
BUN SERPL-MCNC: 17 MG/DL (ref 8–23)
CALCIUM SERPL-MCNC: 10.3 MG/DL (ref 8.8–10.2)
CHLORIDE SERPL-SCNC: 94 MMOL/L (ref 98–107)
CREAT SERPL-MCNC: 0.77 MG/DL (ref 0.51–0.95)
DEPRECATED HCO3 PLAS-SCNC: 25 MMOL/L (ref 22–29)
DIASTOLIC BLOOD PRESSURE - MUSE: NORMAL MMHG
EGFRCR SERPLBLD CKD-EPI 2021: 85 ML/MIN/1.73M2
EOSINOPHIL # BLD AUTO: 0.1 10E3/UL (ref 0–0.7)
EOSINOPHIL NFR BLD AUTO: 2 %
ERYTHROCYTE [DISTWIDTH] IN BLOOD BY AUTOMATED COUNT: 12.4 % (ref 10–15)
GLUCOSE SERPL-MCNC: 102 MG/DL (ref 70–99)
HCT VFR BLD AUTO: 40.9 % (ref 35–47)
HGB BLD-MCNC: 13.7 G/DL (ref 11.7–15.7)
IMM GRANULOCYTES # BLD: 0 10E3/UL
IMM GRANULOCYTES NFR BLD: 0 %
INTERPRETATION ECG - MUSE: NORMAL
LYMPHOCYTES # BLD AUTO: 2.1 10E3/UL (ref 0.8–5.3)
LYMPHOCYTES NFR BLD AUTO: 25 %
MCH RBC QN AUTO: 30.9 PG (ref 26.5–33)
MCHC RBC AUTO-ENTMCNC: 33.5 G/DL (ref 31.5–36.5)
MCV RBC AUTO: 92 FL (ref 78–100)
MONOCYTES # BLD AUTO: 0.6 10E3/UL (ref 0–1.3)
MONOCYTES NFR BLD AUTO: 7 %
NEUTROPHILS # BLD AUTO: 5.4 10E3/UL (ref 1.6–8.3)
NEUTROPHILS NFR BLD AUTO: 65 %
P AXIS - MUSE: 52 DEGREES
PLATELET # BLD AUTO: 340 10E3/UL (ref 150–450)
POTASSIUM SERPL-SCNC: 4.3 MMOL/L (ref 3.4–5.3)
PR INTERVAL - MUSE: 200 MS
PROT SERPL-MCNC: 8.1 G/DL (ref 6.4–8.3)
QRS DURATION - MUSE: 82 MS
QT - MUSE: 394 MS
QTC - MUSE: 431 MS
R AXIS - MUSE: 39 DEGREES
RBC # BLD AUTO: 4.44 10E6/UL (ref 3.8–5.2)
SODIUM SERPL-SCNC: 136 MMOL/L (ref 135–145)
SYSTOLIC BLOOD PRESSURE - MUSE: NORMAL MMHG
T AXIS - MUSE: 38 DEGREES
VENTRICULAR RATE- MUSE: 72 BPM
WBC # BLD AUTO: 8.2 10E3/UL (ref 4–11)

## 2024-06-20 PROCEDURE — 85004 AUTOMATED DIFF WBC COUNT: CPT | Mod: ZL | Performed by: NURSE PRACTITIONER

## 2024-06-20 PROCEDURE — 99214 OFFICE O/P EST MOD 30 MIN: CPT | Performed by: NURSE PRACTITIONER

## 2024-06-20 PROCEDURE — 36415 COLL VENOUS BLD VENIPUNCTURE: CPT | Mod: ZL | Performed by: NURSE PRACTITIONER

## 2024-06-20 PROCEDURE — 93005 ELECTROCARDIOGRAM TRACING: CPT | Performed by: NURSE PRACTITIONER

## 2024-06-20 PROCEDURE — G0463 HOSPITAL OUTPT CLINIC VISIT: HCPCS

## 2024-06-20 PROCEDURE — 93010 ELECTROCARDIOGRAM REPORT: CPT | Performed by: INTERNAL MEDICINE

## 2024-06-20 PROCEDURE — 80053 COMPREHEN METABOLIC PANEL: CPT | Mod: ZL | Performed by: NURSE PRACTITIONER

## 2024-06-20 ASSESSMENT — ANXIETY QUESTIONNAIRES
3. WORRYING TOO MUCH ABOUT DIFFERENT THINGS: NOT AT ALL
1. FEELING NERVOUS, ANXIOUS, OR ON EDGE: NOT AT ALL
7. FEELING AFRAID AS IF SOMETHING AWFUL MIGHT HAPPEN: NOT AT ALL
IF YOU CHECKED OFF ANY PROBLEMS ON THIS QUESTIONNAIRE, HOW DIFFICULT HAVE THESE PROBLEMS MADE IT FOR YOU TO DO YOUR WORK, TAKE CARE OF THINGS AT HOME, OR GET ALONG WITH OTHER PEOPLE: NOT DIFFICULT AT ALL
7. FEELING AFRAID AS IF SOMETHING AWFUL MIGHT HAPPEN: NOT AT ALL
GAD7 TOTAL SCORE: 0
6. BECOMING EASILY ANNOYED OR IRRITABLE: NOT AT ALL
4. TROUBLE RELAXING: NOT AT ALL
GAD7 TOTAL SCORE: 0
2. NOT BEING ABLE TO STOP OR CONTROL WORRYING: NOT AT ALL
5. BEING SO RESTLESS THAT IT IS HARD TO SIT STILL: NOT AT ALL
GAD7 TOTAL SCORE: 0
8. IF YOU CHECKED OFF ANY PROBLEMS, HOW DIFFICULT HAVE THESE MADE IT FOR YOU TO DO YOUR WORK, TAKE CARE OF THINGS AT HOME, OR GET ALONG WITH OTHER PEOPLE?: NOT DIFFICULT AT ALL

## 2024-06-20 ASSESSMENT — PATIENT HEALTH QUESTIONNAIRE - PHQ9
SUM OF ALL RESPONSES TO PHQ QUESTIONS 1-9: 1
10. IF YOU CHECKED OFF ANY PROBLEMS, HOW DIFFICULT HAVE THESE PROBLEMS MADE IT FOR YOU TO DO YOUR WORK, TAKE CARE OF THINGS AT HOME, OR GET ALONG WITH OTHER PEOPLE: NOT DIFFICULT AT ALL
SUM OF ALL RESPONSES TO PHQ QUESTIONS 1-9: 1

## 2024-06-20 ASSESSMENT — PAIN SCALES - GENERAL: PAINLEVEL: SEVERE PAIN (7)

## 2024-06-20 NOTE — PROGRESS NOTES
Preoperative Evaluation  [facility]  [address]  [place]  [place] [address]  Phone: [phone]  Primary Provider: Jackie Hitchcock CNP  Pre-op Performing Provider: Malina Blanco CNP  Jun 20, 2024 6/20/2024   Surgical Information   What procedure is being done? left shoulder rotary cuff repair   Facility or Hospital where procedure/surgery will be performed: [facility] surgery   Who is doing the procedure / surgery? dr lira   Date of surgery / procedure: 06/25/24   Time of surgery / procedure: TBD   Where do you plan to recover after surgery? at home with family        Fax number for surgical facility: on file    Assessment & Plan     The proposed surgical procedure is considered INTERMEDIATE risk.    Pre-op exam  - Comprehensive metabolic panel; Future  - CBC with platelets and differential; Future  - EKG 12-lead complete w/read - (Clinic Performed)  - Comprehensive metabolic panel  - CBC with platelets and differential    Disorder of left rotator cuff    Reactive airway disease that is not asthma    Morbid obesity with body mass index (BMI) of 45.0 to 49.9 in adult (H)  Noted. Recommend follow up with primary provider to discuss treatment options.    Prediabetes  noted    Coronary artery disease involving native coronary artery of native heart without angina pectoris  Coronary artery disease s/p PCI with 2 drug-eluting stents to LAD in March 2021   Noted. Stable. Reviewed with patient. Managed by Alejandro Francois APRN. Last note with Cardiology from 1/8/4 reviewed and medications reconciled.     Essential hypertension  stable    Chronic Diastolic Heart Failure  Vandana was under the impression this has resolved and they no longer have HF. We reviewed the cardiology note together and clarified this is a chronic condition that is managed actively and does not go away. Reinforced need to avoid sodium and work toward health weight.         - No identified  additional risk factors other than previously addressed    Antiplatelet or Anticoagulation Medication Instructions   - aspirin: Discontinue aspirin 7-10 days prior to procedure to reduce bleeding risk. It should be resumed postoperatively.     Additional Medication Instructions  continue acetaminophen (TYLENOL) 650 MG CR tablet   continue albuterol (PROAIR HFA) 108 (90 Base) MCG/ACT inhaler   Do not take AM of surgery. May be taken if BP is over 140 (top) / 90 (bottom)   amLODIPine (NORVASC) 10 MG tablet   Stop 7 days before surgery   aspirin (ASA) 81 MG EC tablet   continue atorvastatin (LIPITOR) 40 MG tablet   Stop 7 days before surgery calcium-vitamin D (CALTRATE) 600-400 MG-UNIT per tablet   continue carvedilol (COREG) 12.5 MG tablet   Continue   escitalopram (LEXAPRO)   Take AM dose with small sip of water.    furosemide (LASIX) 80 MG tablet   Do not take AM or surgery  lisinopril (ZESTRIL) 40 MG tablet   Stop 7 days before surgery magnesium oxide 400 MG CAPS       Do not take AM of surgery montelukast (SINGULAIR) 10 MG tablet     Stop 7 days before surgery   Multiple Vitamin (DAILY MULTIVITAMIN PO)   continue nitroGLYcerin (NITROSTAT) 0.4 MG sublingual tablet   Do not use AM of surgery nystatin (MYCOSTATIN) 565103 UNIT/GM external powder   Stop 7 days before surgery   Omega-3 Fatty Acids (OMEGA 3 PO)   continue potassium chloride ER (KLOR-CON M) 20 MEQ CR tablet       Continue due to heart failure   spironolactone (ALDACTONE) 25 MG tablet   Stop 7 days before surgery vitamin B-12 (CYANOCOBALAMIN) 1000 MCG tablet       Stop 7 days before surgery VITAMIN D, CHOLECALCIFEROL, PO       Recommendation  Approval given to proceed with proposed procedure, without further diagnostic evaluation.    Subjective   Vandana is a 64 year old, presenting for the following:  Pre-Op Exam          4/17/2024     2:58 PM   Additional Questions   Roomed by Karyn HENSLEY   Accompanied by None     HPI related to upcoming procedure:  Vandana is  "a 63 yo patient of LENNY Henderson. They are feeling well this visit with no acute concerns. Medical history is reviewed and pertinent conditions identified as above in \"assessment/plan\".    Medications reviewed and reconciled.         6/20/2024   Pre-Op Questionnaire   Have you ever had a heart attack or stroke? No   Have you ever had surgery on your heart or blood vessels, such as a stent placement, a coronary artery bypass, or surgery on an artery in your head, neck, heart, or legs? (!) YES coronary artery disease s/p PCI with 2 drug-eluting stents to LAD in March 2021    Do you have chest pain with activity? No   Do you have a history of heart failure? No   Do you currently have a cold, bronchitis or symptoms of other infection? No   Do you have a cough, shortness of breath, or wheezing? No   Do you or anyone in your family have previous history of blood clots? No   Do you or does anyone in your family have a serious bleeding problem such as prolonged bleeding following surgeries or cuts? No   Have you ever had problems with anemia or been told to take iron pills? No   Have you had any abnormal blood loss such as black, tarry or bloody stools, or abnormal vaginal bleeding? No   Have you ever had a blood transfusion? No   Are you willing to have a blood transfusion if it is medically needed before, during, or after your surgery? Yes   Have you or any of your relatives ever had problems with anesthesia? No   Do you have sleep apnea, excessive snoring or daytime drowsiness? No   Do you have any artifical heart valves or other implanted medical devices like a pacemaker, defibrillator, or continuous glucose monitor? No   Do you have artificial joints? (!) YES- Right knee > 2 years ago.    Are you allergic to latex? No        Health Care Directive  Patient does not have a Health Care Directive or Living Will: Discussed advance care planning with patient; however, patient declined at this time.    Preoperative Review " of    reviewed - no record of controlled substances prescribed.      Patient Active Problem List    Diagnosis Date Noted    Chronic diastolic heart failure (H) 06/21/2024     Priority: Medium    Colitis 04/17/2024     Priority: Medium    Prediabetes 02/01/2022     Priority: Medium     Formatting of this note is different from the original.  Patient met criteria for the diabetes prevention program for pre-diabetes patients.  Problem  prediabetes  added to patient's list. Reviewed and approved by [facility] in Spring 2021. Eligibility criteria: https://www.cdc.gov/diabetes/prevention/program-eligibility.html  Please contact Dr. Hammond with any concerns.      Age-related nuclear cataract, bilateral 12/14/2021     Priority: Medium    Cupping of optic disc, right 12/14/2021     Priority: Medium    Chronic fatigue 10/13/2021     Priority: Medium    Impaired gait and mobility 08/16/2021     Priority: Medium    Impairment of balance 08/16/2021     Priority: Medium    Right knee pain 08/16/2021     Priority: Medium    Hx of heart artery stent 04/06/2021     Priority: Medium    Coronary artery disease involving native coronary artery of native heart without angina pectoris 04/05/2021     Priority: Medium    History of COVID-19 01/11/2021     Priority: Medium    Urinary, incontinence, stress female 01/11/2021     Priority: Medium    Recurrent major depressive disorder (H24) 05/21/2019     Priority: Medium    Use of tamoxifen (Nolvadex) 02/20/2018     Priority: Medium    ACP (advance care planning) 08/09/2016     Priority: Medium     Advance Care Planning 8/9/2016: ACP Review of Chart / Resources Provided:  Reviewed chart for advance care plan.  Vandana Jaeger has been provided information and resources to begin or update their advance care plan.  Added by Alisson Whiting            Vitamin D deficiency 01/26/2016     Priority: Medium    Essential hypertension 11/24/2015     Priority: Medium    Disorder of  refraction and accommodation 04/02/2015     Priority: Medium     Formatting of this note might be different from the original.  IMO Update      Lesion of left upper eyelid 04/02/2015     Priority: Medium    Reactive airway disease that is not asthma      Priority: Medium     Replacing diagnoses that were inactivated after the 10/1/2021 regulatory import.      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  Goal weight xxx     BMR 1983    Calorie goal: 1700,  Carb goal: 50 net, Protein goal:     Initial visit 06/05/18  Initial Weight: (!) 138.6 kg (305 lb 8.9 oz)    Initial Body mass index is 49.32 kg/m .  Goal weight 150#    BMR 1983    Calorie goal: 1700,  Carb goal: 50 net, Protein goal:       Chondromalacia of patella 08/02/2010     Priority: Medium     Formatting of this note might be different from the original.  IMO Update 10/11      Medial meniscus tear 08/02/2010     Priority: Medium     Formatting of this note might be different from the original.  IMO Update 10/11        Past Medical History:   Diagnosis Date    Anxiety state, unspecified     Arthritis     ASCVD (arteriosclerotic cardiovascular disease) 04/05/2021    Benign essential hypertension 11/24/2015    Breast cancer (H) 02/13/2014    DCIS, LOBULAR CARCINOMA  RIGHT BREAST    Contact dermatitis and other eczema, due to unspecified cause     Coronary artery disease     Depressive disorder     Ductal carcinoma in situ of right breast 01/28/2014    Ductal carcinoma in  situ of right breast 01/28/2014    H/O herpes zoster     History of COVID-19 01/11/2021    Hx of heart artery stent 04/06/2021    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 01/28/2014    Morbid obesity (H) 01/28/2014    Other B-complex deficiencies 08/19/2014    Other specified disease of hair and hair follicles     Primary central sleep apnea 01/26/2016    Reactive airway disease that is not asthma      Problem list name updated by automated process. Provider to review    Recurrent major depressive disorder (H24) 05/21/2019    Uncomplicated asthma     Unspecified immunity deficiency     Urinary, incontinence, stress female 01/11/2021    Vitamin D deficiency 01/26/2016     Past Surgical History:   Procedure Laterality Date    BIOPSY      right breast--neg    BIOPSY BREAST Right 02/13/2014    DCIS, LOBULAR CARCINOMA    BIOPSY BREAST NEEDLE LOCALIZATION  2/13/2014    Procedure: BIOPSY BREAST NEEDLE LOCALIZATION;  WIRE LOCALIZED SEGMENTAL RESECTION, RIGHT BREAST;  Surgeon: Shanika Beaver MD;  Location: [facility]    CARDIAC SURGERY      stent    CHOLECYSTECTOMY  2010    COLONOSCOPY  2009    COLONOSCOPY  11/12/2013    DILATION AND CURETTAGE, OPERATIVE HYSTEROSCOPY, COMBINED  03/2017    [facility]    LUMPECTOMY BREAST Right 02/13/2014    DCIS, LOBULAR CARCINOMA    ORTHOPEDIC SURGERY      heel surgery    ORTHOPEDIC SURGERY      rt knee meniiscus tear     Current Outpatient Medications   Medication Sig Dispense Refill    acetaminophen (TYLENOL) 650 MG CR tablet Take 650 mg by mouth every 8 hours as needed for mild pain or fever      albuterol (PROAIR HFA) 108 (90 Base) MCG/ACT inhaler Inhale 2 Puffs into the lungs every four hours as needed for shortness of breath / dyspnea or wheezing 8.5 g 0    amLODIPine (NORVASC) 10 MG tablet Take 10 mg by mouth daily      aspirin (ASA) 81 MG EC tablet Take 1 tablet (81 mg) by mouth daily 90 tablet 11    atorvastatin (LIPITOR) 40 MG tablet Take 1 Tablet by mouth one time a day. 90 tablet 1     calcium-vitamin D (CALTRATE) 600-400 MG-UNIT per tablet Take 1 tablet by mouth 2 times daily      carvedilol (COREG) 12.5 MG tablet Take 12.5 mg by mouth 2 times daily (with meals)      escitalopram (LEXAPRO) 10 MG tablet Take 1 Tablet by mouth one time a day. Take along with 20mg tablet for a total dose of 30mg. 90 tablet 1    escitalopram (LEXAPRO) 20 MG tablet Take 1 Tablet by mouth one time a day. Take along with 10 mg tablet for a total dose of 30 mg. 90 tablet 1    furosemide (LASIX) 80 MG tablet Take 80 mg by mouth 2 times daily      lisinopril (ZESTRIL) 40 MG tablet Take 1 Tablet by mouth one time a day. 90 tablet 0    magnesium oxide 400 MG CAPS Take 400 mg po daily and occasionally every other day      montelukast (SINGULAIR) 10 MG tablet Take 1 Tablet by mouth at bedtime. 90 tablet 0    Multiple Vitamin (DAILY MULTIVITAMIN PO) Take one tablet by oral route every day with food      nitroGLYcerin (NITROSTAT) 0.4 MG sublingual tablet Place 1 tablet (0.4 mg) under the tongue every 5 minutes as needed for chest pain 8 tablet 0    nystatin (MYCOSTATIN) 249481 UNIT/GM external powder Apply 100,000 g topically 2 times daily as needed (Patient taking differently: Apply topically 2 times daily as needed) 60 g 3    Omega-3 Fatty Acids (OMEGA 3 PO) Take 1,000 mg by mouth daily       potassium chloride ER (KLOR-CON M) 20 MEQ CR tablet Take 20 mEq by mouth daily      spironolactone (ALDACTONE) 25 MG tablet Take 1 tablet by mouth daily      vitamin B-12 (CYANOCOBALAMIN) 1000 MCG tablet Take 1,000 mcg by mouth daily 30 tablet     VITAMIN D, CHOLECALCIFEROL, PO Take 5,000 Units by mouth daily          Allergies   Allergen Reactions    Adhesive Tape      Steri strips left scar.    Latex      Possible allergy.        Social History     Tobacco Use    Smoking status: Former     Current packs/day: 0.00     Average packs/day: 0.1 packs/day for 38.5 years (3.9 ttl pk-yrs)     Types: Cigarettes     Start date: 8/15/1974      "Quit date: 3/3/2013     Years since quittin.3     Passive exposure: Past    Smokeless tobacco: Never   Substance Use Topics    Alcohol use: Yes       History   Drug Use No             Review of Systems  Constitutional, HEENT, cardiovascular, pulmonary, gi and gu systems are negative for acute concerns, except as otherwise noted.    Objective    /72 (BP Location: Right arm, Patient Position: Sitting, Cuff Size: Adult Regular)   Pulse 76   Temp 98.2  F (36.8  C) (Tympanic)   Resp 21   Wt 136.4 kg (300 lb 9.6 oz)   SpO2 98%   BMI 48.52 kg/m     Estimated body mass index is 48.52 kg/m  as calculated from the following:    Height as of 23: 1.676 m (5' 6\").    Weight as of this encounter: 136.4 kg (300 lb 9.6 oz).  Physical Exam  GENERAL: alert, obese, and no distress  EYES: Eyes grossly normal to inspection, PERRL and conjunctivae and sclerae normal  HENT: ear canals and TM's normal, nose and mouth without ulcers or lesions  NECK: no adenopathy, no asymmetry, masses, or scars  RESP: lungs clear to auscultation - no rales, rhonchi or wheezes  CV: regular rates and rhythm, normal S1 S2, no S3 or S4, no murmur, click or rub, and peripheral pulses strong  ABDOMEN: soft, nontender, no hepatosplenomegaly, no masses and bowel sounds normal  MS: no gross musculoskeletal defects noted, no edema  SKIN: no suspicious lesions or rashes  NEURO: Normal strength and tone, mentation intact and speech normal  PSYCH: mentation appears normal, affect normal/bright    Recent Labs   Lab Test 04/15/24  1357 23  1046 23  1132   HGB 13.2  --   --      --   --    INR 1.00  --   --     133* 136   POTASSIUM 4.1 4.5 4.0   CR 0.91 0.95 0.63   A1C  --  5.7* 5.5        Diagnostics     Results for orders placed or performed in visit on 24   Comprehensive metabolic panel     Status: Abnormal   Result Value Ref Range    Sodium 136 135 - 145 mmol/L    Potassium 4.3 3.4 - 5.3 mmol/L    Carbon Dioxide " (CO2) 25 22 - 29 mmol/L    Anion Gap 17 (H) 7 - 15 mmol/L    Urea Nitrogen 17.0 8.0 - 23.0 mg/dL    Creatinine 0.77 0.51 - 0.95 mg/dL    GFR Estimate 85 >60 mL/min/1.73m2    Calcium 10.3 (H) 8.8 - 10.2 mg/dL    Chloride 94 (L) 98 - 107 mmol/L    Glucose 102 (H) 70 - 99 mg/dL    Alkaline Phosphatase 77 40 - 150 U/L    AST 19 0 - 45 U/L    ALT 26 0 - 50 U/L    Protein Total 8.1 6.4 - 8.3 g/dL    Albumin 4.7 3.5 - 5.2 g/dL    Bilirubin Total 0.6 <=1.2 mg/dL   CBC with platelets and differential     Status: None   Result Value Ref Range    WBC Count 8.2 4.0 - 11.0 10e3/uL    RBC Count 4.44 3.80 - 5.20 10e6/uL    Hemoglobin 13.7 11.7 - 15.7 g/dL    Hematocrit 40.9 35.0 - 47.0 %    MCV 92 78 - 100 fL    MCH 30.9 26.5 - 33.0 pg    MCHC 33.5 31.5 - 36.5 g/dL    RDW 12.4 10.0 - 15.0 %    Platelet Count 340 150 - 450 10e3/uL    % Neutrophils 65 %    % Lymphocytes 25 %    % Monocytes 7 %    % Eosinophils 2 %    % Basophils 0 %    % Immature Granulocytes 0 %    Absolute Neutrophils 5.4 1.6 - 8.3 10e3/uL    Absolute Lymphocytes 2.1 0.8 - 5.3 10e3/uL    Absolute Monocytes 0.6 0.0 - 1.3 10e3/uL    Absolute Eosinophils 0.1 0.0 - 0.7 10e3/uL    Absolute Basophils 0.0 0.0 - 0.2 10e3/uL    Absolute Immature Granulocytes 0.0 <=0.4 10e3/uL   EKG 12-lead complete w/read - (Clinic Performed)     Status: None   Result Value Ref Range    Systolic Blood Pressure  mmHg    Diastolic Blood Pressure  mmHg    Ventricular Rate 72 BPM    Atrial Rate 72 BPM    IL Interval 200 ms    QRS Duration 82 ms     ms    QTc 431 ms    P Axis 52 degrees    R AXIS 39 degrees    T Axis 38 degrees    Interpretation ECG       Sinus rhythm  Low voltage QRS  Borderline ECG  No previous ECGs available  Confirmed by MD Scruggs Anthony ([id number]) on 6/20/2024 10:08:47 AM     CBC with platelets and differential     Status: None    Narrative    The following orders were created for panel order CBC with platelets and differential.  Procedure                                Abnormality         Status                     ---------                               -----------         ------                     CBC with platelets and d...[[id number]]                      Final result                 Please view results for these tests on the individual orders.         Revised Cardiac Risk Index (RCRI)  The patient has the following serious cardiovascular risks for perioperative complications:   - Coronary Artery Disease (MI, positive stress test, angina, Qs on EKG) = 1 point   - Congestive Heart Failure (pulmonary edema, PND, s3 lilia, CXR with pulmonary congestion, basilar rales) = 1 point     RCRI Interpretation: 2 points: Class III (moderate risk - 6.6% complication rate)     Estimated Functional Capacity: Performs 4 METS exercise without symptoms (e.g., light housework, stairs, 4 mph walk, 7 mph bike, slow step dance)       Signed Electronically by: Malina Blanco CNP  Copy of this evaluation report is provided to requesting physician.

## 2024-06-20 NOTE — PATIENT INSTRUCTIONS
MEDICATIONS:   Do not start any new medications prior to your surgery.  Stop all supplements (herbal, non-prescription vitamins and minerals) two weeks prior to surgery.     Below are instructions for medications as reported during your visit:    Current Outpatient Medications   Medication   continue acetaminophen (TYLENOL) 650 MG CR tablet   continue albuterol (PROAIR HFA) 108 (90 Base) MCG/ACT inhaler   Do not take AM of surgery. May be taken if BP is over 140 (top) / 90 (bottom)   amLODIPine (NORVASC) 10 MG tablet   Stop 7 days before surgery   aspirin (ASA) 81 MG EC tablet   continue atorvastatin (LIPITOR) 40 MG tablet   Stop 7 days before surgery calcium-vitamin D (CALTRATE) 600-400 MG-UNIT per tablet   continue carvedilol (COREG) 12.5 MG tablet   Continue   escitalopram (LEXAPRO)   Take AM dose with small sip of water.    furosemide (LASIX) 80 MG tablet   Do not take AM or surgery  lisinopril (ZESTRIL) 40 MG tablet   Stop 7 days before surgery magnesium oxide 400 MG CAPS       Do not take AM of surgery montelukast (SINGULAIR) 10 MG tablet     Stop 7 days before surgery   Multiple Vitamin (DAILY MULTIVITAMIN PO)   continue nitroGLYcerin (NITROSTAT) 0.4 MG sublingual tablet   Do not use AM of surgery nystatin (MYCOSTATIN) 109693 UNIT/GM external powder   Stop 7 days before surgery   Omega-3 Fatty Acids (OMEGA 3 PO)   continue potassium chloride ER (KLOR-CON M) 20 MEQ CR tablet       Continue due to heart failure   spironolactone (ALDACTONE) 25 MG tablet   Stop 7 days before surgery vitamin B-12 (CYANOCOBALAMIN) 1000 MCG tablet       Stop 7 days before surgery VITAMIN D, CHOLECALCIFEROL, PO     No current facility-administered medications for this visit.

## 2024-06-21 PROBLEM — I50.32 CHRONIC DIASTOLIC HEART FAILURE (H): Status: ACTIVE | Noted: 2024-06-21

## 2024-07-08 ENCOUNTER — TRANSFERRED RECORDS (OUTPATIENT)
Dept: HEALTH INFORMATION MANAGEMENT | Facility: CLINIC | Age: 64
End: 2024-07-08

## 2024-07-19 DIAGNOSIS — J31.0 CHRONIC RHINITIS: ICD-10-CM

## 2024-07-19 DIAGNOSIS — I10 BENIGN ESSENTIAL HYPERTENSION: ICD-10-CM

## 2024-07-19 RX ORDER — MONTELUKAST SODIUM 10 MG/1
1 TABLET ORAL AT BEDTIME
Qty: 90 TABLET | Refills: 2 | Status: SHIPPED | OUTPATIENT
Start: 2024-07-19

## 2024-07-19 RX ORDER — LISINOPRIL 40 MG/1
TABLET ORAL
Qty: 90 TABLET | Refills: 2 | Status: SHIPPED | OUTPATIENT
Start: 2024-07-19

## 2024-07-24 ENCOUNTER — OFFICE VISIT (OUTPATIENT)
Dept: FAMILY MEDICINE | Facility: OTHER | Age: 64
End: 2024-07-24
Attending: NURSE PRACTITIONER
Payer: MEDICARE

## 2024-07-24 VITALS
OXYGEN SATURATION: 97 % | DIASTOLIC BLOOD PRESSURE: 78 MMHG | HEART RATE: 96 BPM | WEIGHT: 293 LBS | TEMPERATURE: 98.9 F | RESPIRATION RATE: 22 BRPM | BODY MASS INDEX: 47.86 KG/M2 | SYSTOLIC BLOOD PRESSURE: 130 MMHG

## 2024-07-24 DIAGNOSIS — F33.1 MODERATE EPISODE OF RECURRENT MAJOR DEPRESSIVE DISORDER (H): ICD-10-CM

## 2024-07-24 DIAGNOSIS — I25.10 CORONARY ARTERY DISEASE INVOLVING NATIVE CORONARY ARTERY OF NATIVE HEART WITHOUT ANGINA PECTORIS: ICD-10-CM

## 2024-07-24 DIAGNOSIS — Z85.3 PERSONAL HISTORY OF MALIGNANT NEOPLASM OF BREAST: ICD-10-CM

## 2024-07-24 DIAGNOSIS — Z12.31 ENCOUNTER FOR SCREENING MAMMOGRAM FOR MALIGNANT NEOPLASM OF BREAST: ICD-10-CM

## 2024-07-24 DIAGNOSIS — J98.9 REACTIVE AIRWAY DISEASE WITHOUT ASTHMA: ICD-10-CM

## 2024-07-24 DIAGNOSIS — F41.9 ANXIETY: ICD-10-CM

## 2024-07-24 DIAGNOSIS — I10 ESSENTIAL HYPERTENSION: Primary | ICD-10-CM

## 2024-07-24 DIAGNOSIS — R73.03 PREDIABETES: ICD-10-CM

## 2024-07-24 DIAGNOSIS — I50.32 CHRONIC DIASTOLIC HEART FAILURE (H): ICD-10-CM

## 2024-07-24 LAB
ALBUMIN UR-MCNC: NEGATIVE MG/DL
APPEARANCE UR: CLEAR
BILIRUB UR QL STRIP: NEGATIVE
COLOR UR AUTO: YELLOW
GLUCOSE UR STRIP-MCNC: NEGATIVE MG/DL
HGB UR QL STRIP: NEGATIVE
KETONES UR STRIP-MCNC: NEGATIVE MG/DL
LEUKOCYTE ESTERASE UR QL STRIP: NEGATIVE
NITRATE UR QL: NEGATIVE
PH UR STRIP: 6 [PH] (ref 5–7)
SP GR UR STRIP: 1.01 (ref 1–1.03)
UROBILINOGEN UR STRIP-ACNC: 0.2 E.U./DL

## 2024-07-24 PROCEDURE — 83036 HEMOGLOBIN GLYCOSYLATED A1C: CPT | Mod: ZL | Performed by: NURSE PRACTITIONER

## 2024-07-24 PROCEDURE — G0463 HOSPITAL OUTPT CLINIC VISIT: HCPCS

## 2024-07-24 PROCEDURE — 80053 COMPREHEN METABOLIC PANEL: CPT | Mod: ZL | Performed by: NURSE PRACTITIONER

## 2024-07-24 PROCEDURE — 99215 OFFICE O/P EST HI 40 MIN: CPT | Performed by: NURSE PRACTITIONER

## 2024-07-24 PROCEDURE — G2211 COMPLEX E/M VISIT ADD ON: HCPCS | Performed by: NURSE PRACTITIONER

## 2024-07-24 PROCEDURE — 81003 URINALYSIS AUTO W/O SCOPE: CPT | Mod: ZL | Performed by: NURSE PRACTITIONER

## 2024-07-24 PROCEDURE — 84443 ASSAY THYROID STIM HORMONE: CPT | Mod: ZL | Performed by: NURSE PRACTITIONER

## 2024-07-24 PROCEDURE — 36415 COLL VENOUS BLD VENIPUNCTURE: CPT | Mod: ZL | Performed by: NURSE PRACTITIONER

## 2024-07-24 PROCEDURE — 80061 LIPID PANEL: CPT | Mod: ZL | Performed by: NURSE PRACTITIONER

## 2024-07-24 RX ORDER — NITROGLYCERIN 0.4 MG/1
0.4 TABLET SUBLINGUAL EVERY 5 MIN PRN
Qty: 8 TABLET | Refills: 0 | Status: SHIPPED | OUTPATIENT
Start: 2024-07-24

## 2024-07-24 RX ORDER — ALBUTEROL SULFATE 90 UG/1
AEROSOL, METERED RESPIRATORY (INHALATION)
Qty: 8.5 G | Refills: 0 | Status: SHIPPED | OUTPATIENT
Start: 2024-07-24

## 2024-07-24 RX ORDER — PREDNISONE 20 MG/1
TABLET ORAL
COMMUNITY
Start: 2024-07-08

## 2024-07-24 ASSESSMENT — PAIN SCALES - GENERAL: PAINLEVEL: MODERATE PAIN (5)

## 2024-07-24 NOTE — PATIENT INSTRUCTIONS
Assessment & Plan         Essential hypertension  - Comprehensive metabolic panel; Future  - Lipid Profile (Chol, Trig, HDL, LDL calc); Future  - TSH with free T4 reflex; Future  - UA Macroscopic with reflex to Microscopic and Culture; Future      Prediabetes  - Hemoglobin A1c; Future      Coronary artery disease involving native coronary artery of native heart without angina pectoris  - Continue Follow-up with Cardiology  - nitroGLYcerin (NITROSTAT) 0.4 MG sublingual tablet; Place 1 tablet (0.4 mg) under the tongue every 5 minutes as needed for chest pain      Chronic diastolic heart failure (H)  - Continue Follow-up with Cardiology      Anxiety  - Continue medication as directed      Moderate episode of recurrent major depressive disorder (H)  - Continue medication as directed      Reactive airway disease without asthma  - albuterol (PROAIR HFA) 108 (90 Base) MCG/ACT inhaler; Inhale 2 Puffs into the lungs every four hours as needed for shortness of breath / dyspnea or wheezing      Encounter for screening mammogram for malignant neoplasm of breast  - MA Screen Bilateral w/Sen; Future      Personal history of malignant neoplasm of breast  - MA Screen Bilateral w/Sen; Future  - Continue Follow-up with Oncology  - I have updated Birdie Patrick NP regarding your Mammogram orders        Return in about 6 months (around 1/24/2025), or Medicare physical and CDM.      Please continue to take all medication as directed  Please notify your pharmacy or our refill line of future refills needed.  Please return sooner than your next scheduled appointment with any concerns.      When your lab results return, we will call you with abnormal findings  You can also view this information in your MyChart, if you have an account.  Please call or [place] message us with any questions you may have.            Jackie ESPITIA  [phone]

## 2024-07-24 NOTE — LETTER
"My Heart Failure Action Plan  Name: Vandana Jaeger   YOB: 1960  Date: 7/24/2024   My doctor:   Jackie Hitchcock [facility]   [address] [place] [address]  [place] [address]  [phone] My Diagnosis: diastolic heart failure  My Ejection Fraction: No results found for: \"LVEF\"  Not on file  My Exercise Goal: Start exercise slowly - to begin, do a few minutes of exercise, several times a day. Increase your time and speed [id number] to build tolerance, with a goal of 30 minutes of exercise daily. Steady, slow, and consistent exercise is both safe and healthy. Stop and rest when you feel tired or become short of breath. Do not push yourself on days when you don t feel well.       My Weight Plan:   Wt Readings from Last 2 Encounters:   07/24/24 134.5 kg (296 lb 8 oz)   06/20/24 136.4 kg (300 lb 9.6 oz)     Weigh yourself daily using the same scale. If you gain more than 2 pounds in 24 hours or 5 pounds in 7 days. call the clinic    My Diet Goal: No added salt    Emergency Room Visits:    Our goal is to improve your quality of life and help you avoid a visit to the emergency room or hospital.  If we work together, we can achieve this goal. But, if you feel you need to call 911 or go to the emergency room, please do so.  If you go to the emergency room, please bring your list of medicines and your daily weight chart with you.    Each day ask yourself:  Is my weight up?  Do I have swelling?  Do I have trouble breathing?  How did I sleep?  Other problems?       GREEN ZONE     Weight gained is no more than 2 pounds a day or 5 pounds a in 7 days.  No swelling in feet, ankles, legs or stomach.  No more swelling than usual.  No more trouble breathing than usual.  No change in my sleep.  No other problems. What should I do?  I am doing fine. I will take my medicine, follow my diet, see my doctor, exercise, and watch for symptoms.           YELLOW ZONE         Weight gain of more than 2 " pounds in one day or 5 pounds in 7 days.  New swelling in ankle, leg, knee or thigh.  Bloating in belly, pants feel tighter.  Swelling in hands or face.  Coughing or trouble breathing while walking or talking.  Harder to breathe last night.  Have trouble sleeping, wake up short of breath.  Unusually tired.  Not eating.  Nausea, vomiting, or diarrhea  Pain in my chest or bad  leg cramps.  Feel weak or dizzy. What should I do?  I need to take action and call my doctor or nurse today.                 RED ZONE         Weight gain of 5 pounds overnight.  Chest pain or pressure that does not go away.  Feel less alert.  Wheezing or have trouble breathing when at rest.  Cannot sleep lying down.  Cannot take my medicines.  Pass out or faint. What should I do?  I need to call my doctor or nurse now!  Call 911 if I have chest pain or cannot breathe.

## 2024-07-24 NOTE — Clinical Note
Here for CDM visit - has not had breast MRI.   I ordered her 3D mammo - she doesn't feel she h=can do breast MRI due to left shoulder surgery recovery.   TRUDY LOBATORUDY [phone]

## 2024-07-24 NOTE — PROGRESS NOTES
Assessment & Plan         Essential hypertension  - Comprehensive metabolic panel; Future  - Lipid Profile (Chol, Trig, HDL, LDL calc); Future  - TSH with free T4 reflex; Future  - UA Macroscopic with reflex to Microscopic and Culture; Future      Prediabetes  - Hemoglobin A1c; Future      Coronary artery disease involving native coronary artery of native heart without angina pectoris  - Continue Follow-up with Cardiology  - nitroGLYcerin (NITROSTAT) 0.4 MG sublingual tablet; Place 1 tablet (0.4 mg) under the tongue every 5 minutes as needed for chest pain      Chronic diastolic heart failure (H)  - Continue Follow-up with Cardiology      Anxiety  - Continue medication as directed      Moderate episode of recurrent major depressive disorder (H)  - Continue medication as directed      Reactive airway disease without asthma  - albuterol (PROAIR HFA) 108 (90 Base) MCG/ACT inhaler; Inhale 2 Puffs into the lungs every four hours as needed for shortness of breath / dyspnea or wheezing      Encounter for screening mammogram for malignant neoplasm of breast  - MA Screen Bilateral w/Sen; Future      Personal history of malignant neoplasm of breast  - MA Screen Bilateral w/Sen; Future  - Continue Follow-up with Oncology  - I have updated Birdie Patrick NP regarding your Mammogram orders        Return in about 6 months (around 1/24/2025), or Medicare physical and CDM.      Please continue to take all medication as directed  Please notify your pharmacy or our refill line of future refills needed.  Please return sooner than your next scheduled appointment with any concerns.      When your lab results return, we will call you with abnormal findings  You can also view this information in your MyChart, if you have an account.  Please call or [employer] message us with any questions you may have.          Visit time 40 minutes.  Patient examination, chart review, ordering of lab and diagnostic testing, lab interpretation.   Medication review, Health maintenance updates, communication with Oncology provider, patient education.         Jackie Coretta [facility]  [phone]           Vandana is a 64 year old, presenting for the following health issues:  Chronic Disease Management          Hypertension Follow-up  Do you check your blood pressure regularly outside of the clinic? Yes   Are you following a low salt diet? Yes  Are your blood pressures ever more than 140 on the top number (systolic) OR more   than 90 on the bottom number (diastolic), for example 140/90? No        Coronary Vascular Disease Follow-up  Follows with Cardiology  How often do you take nitroglycerin? Never  Do you take an aspirin every day? Yes        Heart Failure Follow-up  Follows with Cardiology at   Are you experiencing any shortness of breath? Yes, with activity only     How would you describe your shortness of breath?  Same as usual  Are you experiencing any swelling in your legs or feet?  No  Are you using more pillows than usual? No  Do you cough at night?  No  Do you check your weight daily?  No  Have you had a weight change recently?  No  Are you having any of the following side effects from your medications? (Select all that apply)  The patient does not report symptoms of dizziness, fatigue, cough, swelling, or slow heart beat.  Since your last visit, how many times have you gone to the cardiologist, urgent care, emergency room, or hospital because of your heart failure?   None  Last Echo: No results found.        Depression and Anxiety   How are you doing with your depression since your last visit? Improved   How are you doing with your anxiety since your last visit?  Improved   Are you having other symptoms that might be associated with depression or anxiety? No  Have you had a significant life event? No   Do you have any concerns with your use of alcohol or other drugs? No          Social History     Tobacco Use    Smoking status: Former     Current  packs/day: 0.00     Average packs/day: 0.1 packs/day for 38.5 years (3.9 ttl pk-yrs)     Types: Cigarettes     Start date: 8/15/1974     Quit date: 3/3/2013     Years since quittin.4     Passive exposure: Past    Smokeless tobacco: Never   Vaping Use    Vaping status: Never Used   Substance Use Topics    Alcohol use: Yes    Drug use: No               2023    11:18 AM 2023    10:29 AM 2024     9:48 AM   PHQ   PHQ-9 Total Score 3 3 1   Q9: Thoughts of better off dead/self-harm past 2 weeks Not at all Not at all Not at all               2023    11:20 AM 2023    10:00 AM 2024     9:55 AM   REUBEN-7 SCORE   Total Score 5 (mild anxiety)  0 (minimal anxiety)   Total Score 5 5 0               2024     9:48 AM   Last PHQ-9   1.  Little interest or pleasure in doing things 0   2.  Feeling down, depressed, or hopeless 0   3.  Trouble falling or staying asleep, or sleeping too much 0   4.  Feeling tired or having little energy 1   5.  Poor appetite or overeating 0   6.  Feeling bad about yourself 0   7.  Trouble concentrating 0   8.  Moving slowly or restless 0   Q9: Thoughts of better off dead/self-harm past 2 weeks 0   PHQ-9 Total Score 1             2024     9:55 AM   REUBEN-7    1. Feeling nervous, anxious, or on edge 0   2. Not being able to stop or control worrying 0   3. Worrying too much about different things 0   4. Trouble relaxing 0   5. Being so restless that it is hard to sit still 0   6. Becoming easily annoyed or irritable 0   7. Feeling afraid, as if something awful might happen 0   REUBEN-7 Total Score 0   If you checked any problems, how difficult have they made it for you to do your work, take care of things at home, or get along with other people? Not difficult at all         Personal history of breast cancer - DCIS right breast       Oncologic History:      Vandana had undergone an abnormal mammogram in 2013 which showed microcalcifications suspicious for  malignancy. She subsequently underwent stereotactic biopsy on November 21, 2013 with original interpretation of histopathology being that of hyperplasia without atypia.  This was then over read as part of routine protocol in January 2014.  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  She was evaluated by Dr. Shanika Beaver in Gen. Surgery on January 28, 2014 and bilateral MRI of the breast was obtained which revealed oval-shaped nodular densities in both breasts consistent with inframammary lymph nodes.      The patient was seen by Dr. Chávez of radiation oncology on 1/31/2014 and his impression was that the patient had DCIS and would require definitive surgery.     The patient underwent excisional breast biopsy on 2/13/2014 and pathology showed LCIS, multifocal, completely excised and low-grade, grade 1, DCIS  which was completely excised.  Tumor size was at least 0.5 cm DCIS grade 1; margins were uninvolved by DCIS.  Estrogen receptor was positive at 90% and progesterone receptor was positive at 50%.  Patient was staged pathologic Tis associated with LCIS.  She was started on tamoxifen.     Previous TX:  Tamoxifen 20 mg daily started March 2014 and completed 3/2019       ASSESSMENT/PLAN:     #1 high risk for breast cancer: History of DCIS and LCIS and completed 5 years of tamoxifen therapy.  Patient would like to continue with high screening alternating mammogram with MRI every 6 months.  She is due for breast MRI this month and we did discuss that [facility] radiology in [place] does have an MRI scanner that should work for her height and weight.  She is aware that I did talk with one of the technicians back in August regarding this.  Patient will decide on this.  We also did discuss possible other way of screening which would be 3D mammogram with IV contrast and we did discuss that the efficacy is not really known but it would be an alternative if she is unable to have the MRI  completed.  She will let me know what she decides.       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  She underwent lumpectomy and was placed on tamoxifen 20 mg daily and completed 5 years of therapy.       #2 history of LCIS: Lobular carcinoma in situ completely excised January 2014.  She completed 5 years of tamoxifen therapy as above.  Plan was to continue with high risk screening.        #3 family history of cancer: Number was given for the cancer risk management group scheduling and she will contact them to schedule an appointment.        I encouraged patient to call with any questions or concerns.     Matilde Patrick, NP  APRN, FNP-BC, AOCNP          She had left shoulder surgery with Dr escamilla, is recovering nicely, going to PT at [facility].  She will Follow-up with Dr Escamilla as scheduled.          Review of Systems  Constitutional, HEENT, cardiovascular, pulmonary, GI, , musculoskeletal, neuro, skin, endocrine and psych systems are negative, except as otherwise noted.            Objective    /78 (BP Location: Left arm, Patient Position: Sitting, Cuff Size: Adult Large)   Pulse 96   Temp 98.9  F (37.2  C) (Tympanic)   Resp 22   Wt 134.5 kg (296 lb 8 oz)   SpO2 97%   BMI 47.86 kg/m    Body mass index is 47.86 kg/m .          Physical Exam   GENERAL: alert and no distress  EYES: Eyes grossly normal to inspection, PERRL and conjunctivae and sclerae normal  HENT: ear canals and TM's normal, nose and mouth without ulcers or lesions  NECK: no adenopathy, no asymmetry, masses, or scars  RESP: lungs clear to auscultation - no rales, rhonchi or wheezes  CV: regular rate and rhythm, normal S1 S2, no S3 or S4, no murmur, click or rub, no peripheral edema  MS: Left outer thigh pain  SKIN: no suspicious lesions or rashes  PSYCH: mentation appears normal, affect  normal/bright          The longitudinal plan of care for the diagnosis(es)/condition(s) as documented were addressed during this visit. Due to the added complexity in care, I will continue to support Vandana in the subsequent management and with ongoing continuity of care.             Signed Electronically by: Jackie Hitchcock CNP

## 2024-07-25 LAB
ALBUMIN SERPL BCG-MCNC: 4.7 G/DL (ref 3.5–5.2)
ALP SERPL-CCNC: 68 U/L (ref 40–150)
ALT SERPL W P-5'-P-CCNC: 41 U/L (ref 0–50)
ANION GAP SERPL CALCULATED.3IONS-SCNC: 17 MMOL/L (ref 7–15)
AST SERPL W P-5'-P-CCNC: 22 U/L (ref 0–45)
BILIRUB SERPL-MCNC: 0.5 MG/DL
BUN SERPL-MCNC: 30.4 MG/DL (ref 8–23)
CALCIUM SERPL-MCNC: 10 MG/DL (ref 8.8–10.4)
CHLORIDE SERPL-SCNC: 97 MMOL/L (ref 98–107)
CHOLEST SERPL-MCNC: 169 MG/DL
CREAT SERPL-MCNC: 0.99 MG/DL (ref 0.51–0.95)
EGFRCR SERPLBLD CKD-EPI 2021: 63 ML/MIN/1.73M2
EST. AVERAGE GLUCOSE BLD GHB EST-MCNC: 108 MG/DL
FASTING STATUS PATIENT QL REPORTED: NO
FASTING STATUS PATIENT QL REPORTED: NO
GLUCOSE SERPL-MCNC: 131 MG/DL (ref 70–99)
HBA1C MFR BLD: 5.4 %
HCO3 SERPL-SCNC: 22 MMOL/L (ref 22–29)
HDLC SERPL-MCNC: 64 MG/DL
LDLC SERPL CALC-MCNC: 87 MG/DL
NONHDLC SERPL-MCNC: 105 MG/DL
POTASSIUM SERPL-SCNC: 4.8 MMOL/L (ref 3.4–5.3)
PROT SERPL-MCNC: 8 G/DL (ref 6.4–8.3)
SODIUM SERPL-SCNC: 136 MMOL/L (ref 135–145)
TRIGL SERPL-MCNC: 91 MG/DL
TSH SERPL DL<=0.005 MIU/L-ACNC: 0.4 UIU/ML (ref 0.3–4.2)

## 2024-07-26 NOTE — RESULT ENCOUNTER NOTE
Her labs look stable  A1C is 5.4 which is normal, down from 5.7 seven months ago    Lipids look great      Jackie Hitchcock [facility]  [phone]

## 2024-08-26 ENCOUNTER — TRANSFERRED RECORDS (OUTPATIENT)
Dept: HEALTH INFORMATION MANAGEMENT | Facility: CLINIC | Age: 64
End: 2024-08-26

## 2024-10-15 ENCOUNTER — APPOINTMENT (OUTPATIENT)
Dept: CT IMAGING | Facility: HOSPITAL | Age: 64
End: 2024-10-15
Attending: PHYSICIAN ASSISTANT
Payer: MEDICARE

## 2024-10-15 ENCOUNTER — HOSPITAL ENCOUNTER (EMERGENCY)
Facility: HOSPITAL | Age: 64
Discharge: HOME | End: 2024-10-15
Attending: PHYSICIAN ASSISTANT | Admitting: PHYSICIAN ASSISTANT
Payer: MEDICARE

## 2024-10-15 ENCOUNTER — OFFICE VISIT (OUTPATIENT)
Dept: FAMILY MEDICINE | Facility: OTHER | Age: 64
End: 2024-10-15
Attending: NURSE PRACTITIONER
Payer: MEDICARE

## 2024-10-15 VITALS
OXYGEN SATURATION: 93 % | BODY MASS INDEX: 47.52 KG/M2 | SYSTOLIC BLOOD PRESSURE: 105 MMHG | HEART RATE: 85 BPM | RESPIRATION RATE: 22 BRPM | WEIGHT: 293 LBS | TEMPERATURE: 98.1 F | DIASTOLIC BLOOD PRESSURE: 75 MMHG

## 2024-10-15 VITALS
SYSTOLIC BLOOD PRESSURE: 119 MMHG | OXYGEN SATURATION: 98 % | DIASTOLIC BLOOD PRESSURE: 93 MMHG | TEMPERATURE: 97.5 F | RESPIRATION RATE: 22 BRPM | HEART RATE: 97 BPM

## 2024-10-15 DIAGNOSIS — I50.32 CHRONIC DIASTOLIC HEART FAILURE (H): ICD-10-CM

## 2024-10-15 DIAGNOSIS — R06.09 DOE (DYSPNEA ON EXERTION): ICD-10-CM

## 2024-10-15 DIAGNOSIS — Z01.818 PRE-OP EXAM: Primary | ICD-10-CM

## 2024-10-15 DIAGNOSIS — I25.10 CORONARY ARTERY DISEASE INVOLVING NATIVE CORONARY ARTERY OF NATIVE HEART WITHOUT ANGINA PECTORIS: ICD-10-CM

## 2024-10-15 DIAGNOSIS — R42 DIZZINESS: ICD-10-CM

## 2024-10-15 DIAGNOSIS — R06.09 DYSPNEA ON EXERTION: ICD-10-CM

## 2024-10-15 DIAGNOSIS — M19.012 ARTHROPATHY OF LEFT SHOULDER: ICD-10-CM

## 2024-10-15 LAB
ANION GAP SERPL CALCULATED.3IONS-SCNC: 14 MMOL/L (ref 7–15)
BASOPHILS # BLD AUTO: 0 10E3/UL (ref 0–0.2)
BASOPHILS NFR BLD AUTO: 0 %
BUN SERPL-MCNC: 24 MG/DL (ref 8–23)
CALCIUM SERPL-MCNC: 9.7 MG/DL (ref 8.8–10.4)
CHLORIDE SERPL-SCNC: 99 MMOL/L (ref 98–107)
CREAT SERPL-MCNC: 1.07 MG/DL (ref 0.51–0.95)
D DIMER PPP FEU-MCNC: 0.65 UG/ML FEU (ref 0–0.5)
EGFRCR SERPLBLD CKD-EPI 2021: 57 ML/MIN/1.73M2
EOSINOPHIL # BLD AUTO: 0.2 10E3/UL (ref 0–0.7)
EOSINOPHIL NFR BLD AUTO: 2 %
ERYTHROCYTE [DISTWIDTH] IN BLOOD BY AUTOMATED COUNT: 12.6 % (ref 10–15)
GLUCOSE SERPL-MCNC: 114 MG/DL (ref 70–99)
HCO3 SERPL-SCNC: 22 MMOL/L (ref 22–29)
HCT VFR BLD AUTO: 36.8 % (ref 35–47)
HGB BLD-MCNC: 12 G/DL (ref 11.7–15.7)
IMM GRANULOCYTES # BLD: 0 10E3/UL
IMM GRANULOCYTES NFR BLD: 0 %
LYMPHOCYTES # BLD AUTO: 1.9 10E3/UL (ref 0.8–5.3)
LYMPHOCYTES NFR BLD AUTO: 19 %
MCH RBC QN AUTO: 30.5 PG (ref 26.5–33)
MCHC RBC AUTO-ENTMCNC: 32.6 G/DL (ref 31.5–36.5)
MCV RBC AUTO: 93 FL (ref 78–100)
MONOCYTES # BLD AUTO: 1 10E3/UL (ref 0–1.3)
MONOCYTES NFR BLD AUTO: 10 %
NEUTROPHILS # BLD AUTO: 6.8 10E3/UL (ref 1.6–8.3)
NEUTROPHILS NFR BLD AUTO: 68 %
NRBC # BLD AUTO: 0 10E3/UL
NRBC BLD AUTO-RTO: 0 /100
NT-PROBNP SERPL-MCNC: 106 PG/ML (ref 0–900)
PLAT MORPH BLD: ABNORMAL
PLATELET # BLD AUTO: 314 10E3/UL (ref 150–450)
POTASSIUM SERPL-SCNC: 4.8 MMOL/L (ref 3.4–5.3)
RBC # BLD AUTO: 3.94 10E6/UL (ref 3.8–5.2)
RBC MORPH BLD: ABNORMAL
SODIUM SERPL-SCNC: 135 MMOL/L (ref 135–145)
STOMATOCYTES BLD QL SMEAR: SLIGHT
TROPONIN T SERPL HS-MCNC: 11 NG/L
WBC # BLD AUTO: 9.9 10E3/UL (ref 4–11)

## 2024-10-15 PROCEDURE — 250N000011 HC RX IP 250 OP 636: Performed by: RADIOLOGY

## 2024-10-15 PROCEDURE — 85379 FIBRIN DEGRADATION QUANT: CPT | Performed by: PHYSICIAN ASSISTANT

## 2024-10-15 PROCEDURE — G2211 COMPLEX E/M VISIT ADD ON: HCPCS | Performed by: NURSE PRACTITIONER

## 2024-10-15 PROCEDURE — 99285 EMERGENCY DEPT VISIT HI MDM: CPT | Mod: 25

## 2024-10-15 PROCEDURE — 80048 BASIC METABOLIC PNL TOTAL CA: CPT | Performed by: PHYSICIAN ASSISTANT

## 2024-10-15 PROCEDURE — 93005 ELECTROCARDIOGRAM TRACING: CPT

## 2024-10-15 PROCEDURE — 93010 ELECTROCARDIOGRAM REPORT: CPT | Performed by: INTERNAL MEDICINE

## 2024-10-15 PROCEDURE — G0463 HOSPITAL OUTPT CLINIC VISIT: HCPCS | Mod: 25,27

## 2024-10-15 PROCEDURE — 99284 EMERGENCY DEPT VISIT MOD MDM: CPT | Performed by: PHYSICIAN ASSISTANT

## 2024-10-15 PROCEDURE — 82310 ASSAY OF CALCIUM: CPT | Performed by: PHYSICIAN ASSISTANT

## 2024-10-15 PROCEDURE — 84484 ASSAY OF TROPONIN QUANT: CPT | Performed by: PHYSICIAN ASSISTANT

## 2024-10-15 PROCEDURE — 83880 ASSAY OF NATRIURETIC PEPTIDE: CPT | Performed by: PHYSICIAN ASSISTANT

## 2024-10-15 PROCEDURE — G0463 HOSPITAL OUTPT CLINIC VISIT: HCPCS

## 2024-10-15 PROCEDURE — 250N000013 HC RX MED GY IP 250 OP 250 PS 637: Performed by: PHYSICIAN ASSISTANT

## 2024-10-15 PROCEDURE — 71275 CT ANGIOGRAPHY CHEST: CPT | Mod: MA

## 2024-10-15 PROCEDURE — 85025 COMPLETE CBC W/AUTO DIFF WBC: CPT | Performed by: PHYSICIAN ASSISTANT

## 2024-10-15 PROCEDURE — 93005 ELECTROCARDIOGRAM TRACING: CPT | Performed by: NURSE PRACTITIONER

## 2024-10-15 PROCEDURE — 93010 ELECTROCARDIOGRAM REPORT: CPT | Mod: 76 | Performed by: INTERNAL MEDICINE

## 2024-10-15 PROCEDURE — 36415 COLL VENOUS BLD VENIPUNCTURE: CPT | Performed by: PHYSICIAN ASSISTANT

## 2024-10-15 PROCEDURE — 99215 OFFICE O/P EST HI 40 MIN: CPT | Performed by: NURSE PRACTITIONER

## 2024-10-15 RX ORDER — LORAZEPAM 1 MG/1
1 TABLET ORAL ONCE
Status: COMPLETED | OUTPATIENT
Start: 2024-10-15 | End: 2024-10-15

## 2024-10-15 RX ORDER — IOPAMIDOL 755 MG/ML
77 INJECTION, SOLUTION INTRAVASCULAR ONCE
Status: COMPLETED | OUTPATIENT
Start: 2024-10-15 | End: 2024-10-15

## 2024-10-15 RX ADMIN — LORAZEPAM 1 MG: 1 TABLET ORAL at 13:11

## 2024-10-15 RX ADMIN — IOPAMIDOL 77 ML: 755 INJECTION, SOLUTION INTRAVENOUS at 14:31

## 2024-10-15 ASSESSMENT — COLUMBIA-SUICIDE SEVERITY RATING SCALE - C-SSRS
1. IN THE PAST MONTH, HAVE YOU WISHED YOU WERE DEAD OR WISHED YOU COULD GO TO SLEEP AND NOT WAKE UP?: NO
2. HAVE YOU ACTUALLY HAD ANY THOUGHTS OF KILLING YOURSELF IN THE PAST MONTH?: NO
6. HAVE YOU EVER DONE ANYTHING, STARTED TO DO ANYTHING, OR PREPARED TO DO ANYTHING TO END YOUR LIFE?: NO

## 2024-10-15 ASSESSMENT — ENCOUNTER SYMPTOMS
ACTIVITY CHANGE: 0
PALPITATIONS: 0
APPETITE CHANGE: 0
FATIGUE: 0
FEVER: 0
COUGH: 0
SHORTNESS OF BREATH: 1

## 2024-10-15 ASSESSMENT — ACTIVITIES OF DAILY LIVING (ADL)
ADLS_ACUITY_SCORE: 35

## 2024-10-15 ASSESSMENT — PAIN SCALES - GENERAL: PAINLEVEL: MODERATE PAIN (5)

## 2024-10-15 NOTE — Clinical Note
Please notify surgery center and surgeon that she is not cleared for surgery at this time due to severe shortness of breath.  I am sending her to ER and she will follow with Cardiology in early November - sooner as needed

## 2024-10-15 NOTE — DISCHARGE INSTRUCTIONS
Stay hydrated and change positions slowly.    Follow-up in the clinic for recheck this week.    Return here for any other questions or concerns.

## 2024-10-15 NOTE — PATIENT INSTRUCTIONS
Assessment & Plan         1. Pre-op exam  - EKG 12-lead complete w/read - (Clinic Performed)  - Surgery held for now - surgery center and surgeon will be notified      2. Arthropathy of left shoulder  - See above      3. Coronary artery disease involving native coronary artery of native heart without angina pectoris  - ER for further evaluation      4. Chronic diastolic heart failure (H)  - As above      5. THOMAS (dyspnea on exertion)  - As above          Recommendation  Hold off on surgery for now due to active THOMAS  Will send to ER for further evaluation due to dizziness, fall, and significant THOMAS  Her significant other can take her to ER   I will call ahead to ER with a report            Signed Electronically by: Jackie Hitchcock CNP

## 2024-10-15 NOTE — ED TRIAGE NOTES
"Was at a PCP appointment for a pre-op consult.  Sent here for shortness of breath and dizziness.  BEFAST negative.  99%RA in triage.  EKG done at appointment.    Shortness of breath has been going on \"for a year\" and dizziness has been \"all summer.\"    Fell on the 10th; did lose consciousness either before or after fall.        "

## 2024-10-15 NOTE — PROGRESS NOTES
Preoperative Evaluation  [facility]  [address]  [facility] [address]      Phone: [phone]        Primary Provider: Jackie Hitchcock CNP  Pre-op Performing Provider: Jackie Hitchcock CNP          Oct 15, 2024         Presents for a pre-op today for an upcoming left shoulder replacement      Notes increased THOMAS for about 5 days,  dizziness, fell at home last week.  Reports she has been taking medication as directed       O2 saturation in the high 80's today after she walked into the clinic - Sat after she sat for a couple minutes was 93%  Notes brain fog, fatigue  No cold symptoms  No cough  No fever  No appetite change  No GI or  symptoms  Difficulty with function at home in terms of mobility due to THOMAS, becomes SOB very easily and needs to stop to rest after walking even a short distance in her home        Sees cardiology- Alejandro Carlson at [facility] - next appt November 4th - she has attempted to move this up but was unable to due to provider unavailability        Vandana has a history of CAD s/p PCI with 2 drug eluding stents to LAD in March 2021  Dyspnea has been chronic but has increased over the past week  Not really following salt restriction  No peripheral edema  Reports her weight is unchanged            10/15/2024   Surgical Information   What procedure is being done? Left shoulder replacement   Facility or Hospital where procedure/surgery will be performed: [facility]   Who is doing the procedure / surgery? Dr. Escamilla   Date of surgery / procedure: 10/24/24   Time of surgery / procedure: To be determined   Where do you plan to recover after surgery? at home with family        Fax number for surgical facility:  On File      Vandana is a 64 year old, presenting for the following:  Pre-Op Exam          HPI related to upcoming procedure:     Left shoulder pain, AC degeneration, osteoarthritis        The most recent imaging I have available is attached  below:      MR SHOULDER LEFT WO CONTRAST    COMPARISON: 2/12/2022.    HISTORY: Shoulder pain.    TECHNIQUE: Multiplanar, multisequence noncontrast images of the shoulder.    FINDINGS:  Joints: Moderate AC joint degeneration with small inferior directing spurs which have mass effect on the underlying supraspinatus. Greater than physiologic amount of subacromial subdeltoid fluid, which freely communicated with a small glenohumeral joint effusion. Mild glenohumeral osteoarthritis.    Cuff: Partial full-thickness tear supraspinatus with moderate muscular body atrophy. Partial tear and/or tendinopathy of the infraspinatus with prominent subchondral cysts noted underlying is insertion. The teres minor and subscapularis tendons and muscle bodies were intact.    Biceps: Focal partial tear and/or tendinopathy of the proximal long head of the biceps tendon, just prior to as it enters the bicipital groove.    Labrum: Mild labral degeneration with tiny SLAP tear suggested at 12:00.    Bones: No acute fracture or suspicious marrow replacing lesion.    Soft Tissue: No detectable masses.        IMPRESSION:  1. Moderate AC joint degeneration with small inferior directing spurs which had mass effect on the underlying supraspinatus.  2. Partial full-thickness tear supraspinatus with moderate muscular body atrophy.  3. Partial tear and/or tendinopathy of the infraspinatus with prominent subchondral cysts noted underlying its insertion.  4. Focal partial tear and/or tendinopathy of the proximal long head of the biceps tendon, just prior to as it enters the bicipital groove.  5. Mild glenohumeral osteoarthritis with mild labral degeneration and tiny SLAP tear suggested at 12:00.      Electronically Signed: Venancio Mann 6/7/2024 3:46 PM            10/15/2024   Pre-Op Questionnaire   Have you ever had a heart attack or stroke? No   Have you ever had surgery on your heart or blood vessels, such as a stent placement, a coronary artery  bypass, or surgery on an artery in your head, neck, heart, or legs? (!) YES -  Stents   Do you have chest pain with activity? No   Do you have a history of heart failure? No   Do you currently have a cold, bronchitis or symptoms of other infection? No   Do you have a cough, shortness of breath, or wheezing? (!) YES SOB    Do you or anyone in your family have previous history of blood clots? No   Do you or does anyone in your family have a serious bleeding problem such as prolonged bleeding following surgeries or cuts? No   Have you ever had problems with anemia or been told to take iron pills? No   Have you had any abnormal blood loss such as black, tarry or bloody stools, or abnormal vaginal bleeding? No   Have you ever had a blood transfusion? No   Are you willing to have a blood transfusion if it is medically needed before, during, or after your surgery? Yes   Have you or any of your relatives ever had problems with anesthesia? No   Do you have sleep apnea, excessive snoring or daytime drowsiness? (!) YES Snores   Do you have a CPAP machine? (!) NO    Do you have any artifical heart valves or other implanted medical devices like a pacemaker, defibrillator, or continuous glucose monitor? No   Do you have artificial joints? (!) YES   Are you allergic to latex? No          Health Care Directive  Patient does not have a Health Care Directive or Living Will: Discussed advance care planning with patient; however, patient declined at this time.        Status of Chronic Conditions:  See problem list for active medical problems.  Problems all longstanding and stable, except as noted/documented.  See ROS for pertinent symptoms related to these conditions.          Patient Active Problem List    Diagnosis Date Noted    Chronic diastolic heart failure (H) 06/21/2024     Priority: Medium    Colitis 04/17/2024     Priority: Medium    Prediabetes 02/01/2022     Priority: Medium     Formatting of this note is different from the  original.  Patient met criteria for the diabetes prevention program for pre-diabetes patients.  Problem  prediabetes  added to patient's list. Reviewed and approved by [facility] in Spring 2021. Eligibility criteria: https://www.cdc.gov/diabetes/prevention/program-eligibility.html  Please contact Dr. Hammond with any concerns.      Age-related nuclear cataract, bilateral 12/14/2021     Priority: Medium    Cupping of optic disc, right 12/14/2021     Priority: Medium    Chronic fatigue 10/13/2021     Priority: Medium    Impaired gait and mobility 08/16/2021     Priority: Medium    Impairment of balance 08/16/2021     Priority: Medium    Right knee pain 08/16/2021     Priority: Medium    Hx of heart artery stent 04/06/2021     Priority: Medium    Coronary artery disease involving native coronary artery of native heart without angina pectoris 04/05/2021     Priority: Medium    History of COVID-19 01/11/2021     Priority: Medium    Urinary, incontinence, stress female 01/11/2021     Priority: Medium    Recurrent major depressive disorder (H) 05/21/2019     Priority: Medium    Use of tamoxifen (Nolvadex) 02/20/2018     Priority: Medium    ACP (advance care planning) 08/09/2016     Priority: Medium     Advance Care Planning 8/9/2016: ACP Review of Chart / Resources Provided:  Reviewed chart for advance care plan.  Vandana JHON Jaeger has been provided information and resources to begin or update their advance care plan.  Added by Alisson Whiting            Vitamin D deficiency 01/26/2016     Priority: Medium    Essential hypertension 11/24/2015     Priority: Medium    Disorder of refraction and accommodation 04/02/2015     Priority: Medium     Formatting of this note might be different from the original.  IMO Update      Lesion of left upper eyelid 04/02/2015     Priority: Medium    Reactive airway disease that is not asthma      Priority: Medium     Replacing diagnoses that were inactivated after the 10/1/2021  regulatory import.      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  Goal weight xxx     BMR 1983    Calorie goal: 1700,  Carb goal: 50 net, Protein goal:     Initial visit 06/05/18  Initial Weight: (!) 138.6 kg (305 lb 8.9 oz)    Initial Body mass index is 49.32 kg/m .  Goal weight 150#    BMR 1983    Calorie goal: 1700,  Carb goal: 50 net, Protein goal:       Chondromalacia of patella 08/02/2010     Priority: Medium     Formatting of this note might be different from the original.  IMO Update 10/11      Medial meniscus tear 08/02/2010     Priority: Medium     Formatting of this note might be different from the original.  IMO Update 10/11              Past Medical History:   Diagnosis Date    Anxiety state, unspecified     Arthritis     ASCVD (arteriosclerotic cardiovascular disease) 04/05/2021    Benign essential hypertension 11/24/2015    Breast cancer (H) 02/13/2014    DCIS, LOBULAR CARCINOMA  RIGHT BREAST    Contact dermatitis and other eczema, due to unspecified cause     Coronary artery disease     Depressive disorder     Ductal carcinoma in situ of right breast 01/28/2014    Ductal carcinoma in situ of right breast 01/28/2014    H/O herpes zoster     History of COVID-19 01/11/2021    Hx of heart artery stent 04/06/2021    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 01/28/2014    Morbid obesity (H) 01/28/2014    Other B-complex deficiencies 08/19/2014    Other specified disease of hair and hair follicles     Primary  central sleep apnea 01/26/2016    Reactive airway disease that is not asthma      Problem list name updated by automated process. Provider to review    Recurrent major depressive disorder (H) 05/21/2019    Uncomplicated asthma     Unspecified immunity deficiency     Urinary, incontinence, stress female 01/11/2021    Vitamin D deficiency 01/26/2016         Past Surgical History:   Procedure Laterality Date    BIOPSY      right breast--neg    BIOPSY BREAST Right 02/13/2014    DCIS, LOBULAR CARCINOMA    BIOPSY BREAST NEEDLE LOCALIZATION  2/13/2014    Procedure: BIOPSY BREAST NEEDLE LOCALIZATION;  WIRE LOCALIZED SEGMENTAL RESECTION, RIGHT BREAST;  Surgeon: Shanika Beaver MD;  Location: [facility] OR    CARDIAC SURGERY      stent    CHOLECYSTECTOMY  2010    COLONOSCOPY  2009    COLONOSCOPY  11/12/2013    DILATION AND CURETTAGE, OPERATIVE HYSTEROSCOPY, COMBINED  03/2017    [facility]    LUMPECTOMY BREAST Right 02/13/2014    DCIS, LOBULAR CARCINOMA    ORTHOPEDIC SURGERY      heel surgery    ORTHOPEDIC SURGERY      rt knee meniiscus tear           Current Outpatient Medications   Medication Sig Dispense Refill    acetaminophen (TYLENOL) 650 MG CR tablet Take 650 mg by mouth every 8 hours as needed for mild pain or fever      albuterol (PROAIR HFA) 108 (90 Base) MCG/ACT inhaler Inhale 2 Puffs into the lungs every four hours as needed for shortness of breath / dyspnea or wheezing 8.5 g 0    amLODIPine (NORVASC) 10 MG tablet Take 10 mg by mouth daily      aspirin (ASA) 81 MG EC tablet Take 1 tablet (81 mg) by mouth daily 90 tablet 11    atorvastatin (LIPITOR) 40 MG tablet Take 1 Tablet by mouth one time a day. 90 tablet 1    calcium-vitamin D (CALTRATE) 600-400 MG-UNIT per tablet Take 1 tablet by mouth 2 times daily      carvedilol (COREG) 12.5 MG tablet Take 12.5 mg by mouth 2 times daily (with meals)      escitalopram (LEXAPRO) 10 MG tablet Take 1 Tablet by mouth one time a day. Take along with 20mg tablet for a total dose of 30mg.  90 tablet 1    escitalopram (LEXAPRO) 20 MG tablet Take 1 Tablet by mouth one time a day. Take along with 10 mg tablet for a total dose of 30 mg. 90 tablet 1    furosemide (LASIX) 80 MG tablet Take 80 mg by mouth 2 times daily      lisinopril (ZESTRIL) 40 MG tablet Take 1 Tablet by mouth one time a day. 90 tablet 2    magnesium oxide 400 MG CAPS Take 400 mg po daily and occasionally every other day      montelukast (SINGULAIR) 10 MG tablet Take 1 Tablet by mouth at bedtime. 90 tablet 2    Multiple Vitamin (DAILY MULTIVITAMIN PO) Take one tablet by oral route every day with food      nitroGLYcerin (NITROSTAT) 0.4 MG sublingual tablet Place 1 tablet (0.4 mg) under the tongue every 5 minutes as needed for chest pain 8 tablet 0    nystatin (MYCOSTATIN) 831079 UNIT/GM external powder Apply 100,000 g topically 2 times daily as needed (Patient taking differently: Apply topically 2 times daily as needed.) 60 g 3    Omega-3 Fatty Acids (OMEGA 3 PO) Take 1,000 mg by mouth daily       potassium chloride ER (KLOR-CON M) 20 MEQ CR tablet Take 20 mEq by mouth daily      predniSONE (DELTASONE) 20 MG tablet Take one tablet by mouth once daily for 7 days then take one tablet by mouth every other day for 14 days      spironolactone (ALDACTONE) 25 MG tablet Take 1 tablet by mouth daily      vitamin B-12 (CYANOCOBALAMIN) 1000 MCG tablet Take 1,000 mcg by mouth daily 30 tablet     VITAMIN D, CHOLECALCIFEROL, PO Take 5,000 Units by mouth daily              Allergies   Allergen Reactions    Adhesive Tape      Steri strips left scar.    Latex      Possible allergy.            Social History     Tobacco Use    Smoking status: Former     Current packs/day: 0.00     Average packs/day: 0.1 packs/day for 38.5 years (3.9 ttl pk-yrs)     Types: Cigarettes     Start date: 8/15/1974     Quit date: 3/3/2013     Years since quittin.6     Passive exposure: Past    Smokeless tobacco: Never   Substance Use Topics    Alcohol use: Yes         Family  "History   Problem Relation Age of Onset    Hypertension Mother     Cerebrovascular Disease Mother     Breast Cancer Mother 55         age 71 left mastectomy    C.A.D. Father     Coronary Artery Disease Father     Cerebrovascular Disease Father     Prostate Cancer Father     Thyroid Disease Sister     Breast Cancer Maternal Aunt     Thyroid Disease Other     Diabetes No family hx of     Asthma No family hx of     Hyperlipidemia No family hx of     Colon Cancer No family hx of     Other Cancer No family hx of     Anesthesia Reaction No family hx of     Genetic Disorder No family hx of            History   Drug Use No             Review of Systems  Constitutional, HEENT, cardiovascular, pulmonary, GI, , musculoskeletal, neuro, skin, endocrine and psych systems are negative, except as otherwise noted.          Objective    /75 (BP Location: Right arm, Patient Position: Sitting, Cuff Size: Adult Large)   Pulse 85   Temp 98.1  F (36.7  C) (Tympanic)   Resp 22   Wt 133.5 kg (294 lb 6.4 oz)   SpO2 93%   Breastfeeding No   BMI 47.52 kg/m     Estimated body mass index is 47.52 kg/m  as calculated from the following:    Height as of 23: 1.676 m (5' 6\").    Weight as of this encounter: 133.5 kg (294 lb 6.4 oz).            Physical Exam  GENERAL: alert, over weight, and fatigued  NECK: no adenopathy, no asymmetry, masses, or scars  RESP: lungs clear to auscultation - no rales, rhonchi or wheezes  CV: regular rate and rhythm, normal S1 S2, no S3 or S4, no murmur, click or rub, no peripheral edema  ABDOMEN: soft, nontender, no hepatosplenomegaly, no masses and bowel sounds normal  NEURO: Normal strength and tone, mentation intact and speech normal  PSYCH: mentation appears normal, affect normal/bright          Recent Labs   Lab Test 24  1633 24  1052 04/15/24  1357 23  1046   HGB  --  13.7 13.2  --    PLT  --  340 323  --    INR  --   --  1.00  --     136 135 133*   POTASSIUM 4.8 4.3 " 4.1 4.5   CR 0.99* 0.77 0.91 0.95   A1C 5.4  --   --  5.7*            EKG: appears normal, NSR        Recheck of O2 sat - after waling 45 steps in clinic, she desaturated to 82%  Returned to room, sat down for a couple minutes and sat is 91%          Assessment & Plan         1. Pre-op exam  - EKG 12-lead complete w/read - (Clinic Performed)  - Surgery held for now - surgery center and surgeon will be notified      2. Arthropathy of left shoulder  - See above      3. Coronary artery disease involving native coronary artery of native heart without angina pectoris  - ER for further evaluation      4. Chronic diastolic heart failure (H)  - As above      5. THOMAS (dyspnea on exertion)  - As above          Recommendation  Hold off on surgery for now due to active THOMAS  Will send to ER for further evaluation due to dizziness, fall, and significant THOMAS  Her significant other can take her to ER   I have called ahead to ER with a report        Total visit time - 40 minutes  Time represents total time spent by me on this date of service.  Visit includes:  Pre-visit chart review, ordering diagnostic tests, review of diagnostic results, coordination of care, documentation, interpretation of test results, medication review and renewal, patient education, face to face time with patient, communication of results to patient.  Coordination of care to ER complete        The longitudinal plan of care for the diagnosis(es)/condition(s) as documented were addressed during this visit. Due to the added complexity in care, I will continue to support Vandana in the subsequent management and with ongoing continuity of care.            Signed Electronically by: Jackie Hitchcock, MAXX

## 2024-10-15 NOTE — ED PROVIDER NOTES
History     Chief Complaint   Patient presents with    Shortness of Breath    Dizziness     The history is provided by the patient.     Vandana Jaeger is a 64 year old female who presented to the emergency department from the clinic for evaluation of shortness of breath as well as dizziness.  Discussion with the patient and she reports several months of shortness of breath with exertion as well as several months of dizziness with position change and ambulation.  No hemoptysis.  No chest pain.  She does have a history of coronary stenting.  No fevers.  She is due for a total shoulder on the left.  Was found to be hypoxic with exertion at the clinic.  Oxygen saturations were 98% on arrival.    Allergies:  Allergies   Allergen Reactions    Adhesive Tape      Steri strips left scar.    Latex      Possible allergy.       Problem List:    Patient Active Problem List    Diagnosis Date Noted    Chronic diastolic heart failure (H) 06/21/2024     Priority: Medium    Colitis 04/17/2024     Priority: Medium    Prediabetes 02/01/2022     Priority: Medium     Formatting of this note is different from the original.  Patient met criteria for the diabetes prevention program for pre-diabetes patients.  Problem  prediabetes  added to patient's list. Reviewed and approved by [facility] in Spring 2021. Eligibility criteria: https://www.cdc.gov/diabetes/prevention/program-eligibility.html  Please contact Dr. Hammond with any concerns.      Age-related nuclear cataract, bilateral 12/14/2021     Priority: Medium    Cupping of optic disc, right 12/14/2021     Priority: Medium    Chronic fatigue 10/13/2021     Priority: Medium    Impaired gait and mobility 08/16/2021     Priority: Medium    Impairment of balance 08/16/2021     Priority: Medium    Right knee pain 08/16/2021     Priority: Medium    Hx of heart artery stent 04/06/2021     Priority: Medium    Coronary artery disease involving native coronary artery of native heart  without angina pectoris 04/05/2021     Priority: Medium    History of COVID-19 01/11/2021     Priority: Medium    Urinary, incontinence, stress female 01/11/2021     Priority: Medium    Recurrent major depressive disorder (H) 05/21/2019     Priority: Medium    Use of tamoxifen (Nolvadex) 02/20/2018     Priority: Medium    ACP (advance care planning) 08/09/2016     Priority: Medium     Advance Care Planning 8/9/2016: ACP Review of Chart / Resources Provided:  Reviewed chart for advance care plan.  Vandana Jaeger has been provided information and resources to begin or update their advance care plan.  Added by Alisson Whiting            Vitamin D deficiency 01/26/2016     Priority: Medium    Essential hypertension 11/24/2015     Priority: Medium    Disorder of refraction and accommodation 04/02/2015     Priority: Medium     Formatting of this note might be different from the original.  IMO Update      Lesion of left upper eyelid 04/02/2015     Priority: Medium    Reactive airway disease that is not asthma      Priority: Medium     Replacing diagnoses that were inactivated after the 10/1/2021 regulatory import.      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  Goal weight xxx     BMR 1983    Calorie goal: 1700,  Carb goal: 50 net, Protein goal:     Initial visit 06/05/18  Initial Weight: (!) 138.6 kg (305 lb 8.9 oz)    Initial Body mass index is 49.32 kg/m .  Goal weight 150#    BMR 1983    Calorie goal: 1700,  Carb goal: 50 net,  Protein goal:       Chondromalacia of patella 08/02/2010     Priority: Medium     Formatting of this note might be different from the original.  IMO Update 10/11      Medial meniscus tear 08/02/2010     Priority: Medium     Formatting of this note might be different from the original.  IMO Update 10/11          Past Medical History:    Past Medical History:   Diagnosis Date    Anxiety state, unspecified     Arthritis     ASCVD (arteriosclerotic cardiovascular disease) 04/05/2021    Benign essential hypertension 11/24/2015    Breast cancer (H) 02/13/2014    Contact dermatitis and other eczema, due to unspecified cause     Coronary artery disease     Depressive disorder     Ductal carcinoma in situ of right breast 01/28/2014    Ductal carcinoma in situ of right breast 01/28/2014    H/O herpes zoster     History of COVID-19 01/11/2021    Hx of heart artery stent 04/06/2021    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 01/28/2014    Morbid obesity (H) 01/28/2014    Other B-complex deficiencies 08/19/2014    Other specified disease of hair and hair follicles     Primary central sleep apnea 01/26/2016    Reactive airway disease that is not asthma     Recurrent major depressive disorder (H) 05/21/2019    Uncomplicated asthma     Unspecified immunity deficiency     Urinary, incontinence, stress female 01/11/2021    Vitamin D deficiency 01/26/2016       Past Surgical History:    Past Surgical History:   Procedure Laterality Date    BIOPSY      right breast--neg    BIOPSY BREAST Right 02/13/2014    DCIS, LOBULAR CARCINOMA    BIOPSY BREAST NEEDLE LOCALIZATION  2/13/2014    Procedure: BIOPSY BREAST NEEDLE LOCALIZATION;  WIRE LOCALIZED SEGMENTAL RESECTION, RIGHT BREAST;  Surgeon: Shanika Beaver MD;  Location: [facility]    CARDIAC SURGERY      stent    CHOLECYSTECTOMY  2010    COLONOSCOPY  2009    COLONOSCOPY  11/12/2013    DILATION AND CURETTAGE, OPERATIVE HYSTEROSCOPY, COMBINED  03/2017    [facility]    LUMPECTOMY BREAST  Right 2014    DCIS, LOBULAR CARCINOMA    ORTHOPEDIC SURGERY      heel surgery    ORTHOPEDIC SURGERY      rt knee meniiscus tear       Family History:    Family History   Problem Relation Age of Onset    Hypertension Mother     Cerebrovascular Disease Mother     Breast Cancer Mother 55         age 71 left mastectomy    C.A.D. Father     Coronary Artery Disease Father     Cerebrovascular Disease Father     Prostate Cancer Father     Thyroid Disease Sister     Breast Cancer Maternal Aunt     Thyroid Disease Other     Diabetes No family hx of     Asthma No family hx of     Hyperlipidemia No family hx of     Colon Cancer No family hx of     Other Cancer No family hx of     Anesthesia Reaction No family hx of     Genetic Disorder No family hx of        Social History:  Marital Status:  Single [1]  Social History     Tobacco Use    Smoking status: Former     Current packs/day: 0.00     Average packs/day: 0.1 packs/day for 38.5 years (3.9 ttl pk-yrs)     Types: Cigarettes     Start date: 8/15/1974     Quit date: 3/3/2013     Years since quittin.6     Passive exposure: Past    Smokeless tobacco: Never   Vaping Use    Vaping status: Never Used   Substance Use Topics    Alcohol use: Yes    Drug use: No        Medications:    acetaminophen (TYLENOL) 650 MG CR tablet  albuterol (PROAIR HFA) 108 (90 Base) MCG/ACT inhaler  amLODIPine (NORVASC) 10 MG tablet  aspirin (ASA) 81 MG EC tablet  atorvastatin (LIPITOR) 40 MG tablet  calcium-vitamin D (CALTRATE) 600-400 MG-UNIT per tablet  carvedilol (COREG) 12.5 MG tablet  escitalopram (LEXAPRO) 10 MG tablet  escitalopram (LEXAPRO) 20 MG tablet  furosemide (LASIX) 80 MG tablet  lisinopril (ZESTRIL) 40 MG tablet  magnesium oxide 400 MG CAPS  montelukast (SINGULAIR) 10 MG tablet  Multiple Vitamin (DAILY MULTIVITAMIN PO)  nitroGLYcerin (NITROSTAT) 0.4 MG sublingual tablet  Omega-3 Fatty Acids (OMEGA 3 PO)  potassium chloride ER (KLOR-CON M) 20 MEQ CR tablet  spironolactone  (ALDACTONE) 25 MG tablet  vitamin B-12 (CYANOCOBALAMIN) 1000 MCG tablet  VITAMIN D, CHOLECALCIFEROL, PO  nystatin (MYCOSTATIN) 450119 UNIT/GM external powder          Review of Systems   Constitutional:  Negative for activity change, appetite change, fatigue and fever.   Respiratory:  Positive for shortness of breath. Negative for cough.         See HPI   Cardiovascular:  Negative for chest pain and palpitations.   Neurological:         See HPI       Physical Exam   BP: 111/73  Pulse: 84  Temp: 97.9  F (36.6  C)  Resp: 20  SpO2: 99 %      Physical Exam  Vitals and nursing note reviewed.   Constitutional:       General: She is not in acute distress.     Appearance: Normal appearance. She is obese. She is not ill-appearing, toxic-appearing or diaphoretic.   Cardiovascular:      Rate and Rhythm: Normal rate and regular rhythm.   Pulmonary:      Effort: Pulmonary effort is normal.   Skin:     General: Skin is warm and dry.      Capillary Refill: Capillary refill takes less than 2 seconds.   Neurological:      General: No focal deficit present.      Mental Status: She is alert and oriented to person, place, and time.         ED Course     ED Course as of 10/15/24 1505   Tue Oct 15, 2024   1247 My independent review of the EKG shows a normal sinus rhythm at a rate of 84.  Normal OK interval.  Normal QRS duration.  Normal QTc.  Normal axis.  There are no concerning ST segments.  There are no concerning T waves.     Procedures              Critical Care time:  none              Results for orders placed or performed during the hospital encounter of 10/15/24 (from the past 24 hour(s))   EKG 12-lead, tracing only   Result Value Ref Range    Systolic Blood Pressure  mmHg    Diastolic Blood Pressure  mmHg    Ventricular Rate 84 BPM    Atrial Rate 84 BPM    OK Interval 196 ms    QRS Duration 76 ms     ms    QTc 413 ms    P Axis 68 degrees    R AXIS 55 degrees    T Axis 47 degrees    Interpretation ECG       Sinus  rhythm  Normal ECG  When compared with ECG of 15-Oct-2024 11:06, (unconfirmed)  No significant change was found     CBC with platelets differential    Narrative    The following orders were created for panel order CBC with platelets differential.  Procedure                               Abnormality         Status                     ---------                               -----------         ------                     CBC with platelets and d...[[id number]]                      Final result               RBC and Platelet Morphology[[id number]]  Abnormal            Final result                 Please view results for these tests on the individual orders.   D dimer quantitative   Result Value Ref Range    D-Dimer Quantitative 0.65 (H) 0.00 - 0.50 ug/mL FEU    Narrative    This D-dimer assay is intended for use in conjunction with a clinical pretest probability assessment model to exclude pulmonary embolism (PE) and deep venous thrombosis (DVT) in outpatients suspected of PE or DVT. The cut-off value is 0.50 ug/mL FEU.    For patients 50 years of age or older, the application of age-adjusted cut-off values for D-Dimer may increase the specificity without significant effect on sensitivity. The literature suggested calculation age adjusted cut-off in ug/L = age in years x 10 ug/L. The results in this laboratory are reported as ug/mL rather than ug/L. The calculation for age adjusted cut off in ug/mL= age in years x 0.01 ug/mL. For example, the cut off for a 76 year old male is 76 x 0.01 ug/mL = 0.76 ug/mL (760 ug/L).    M Melo et al. Age adjusted D-dimer cut-off levels to rule out pulmonary embolism: The ADJUST-PE Study. YOCASTA 2014;311:4015-7789.; OLGA Montes De Oca et al. Diagnostic accuracy of conventional or age adjusted D-dimer cutoff values in older patients with suspected venous thromboembolism. Systemic review and meta-analysis. BMJ 2013:346:f2492.   Basic metabolic panel   Result Value Ref Range    Sodium 135 135 - 145  mmol/L    Potassium 4.8 3.4 - 5.3 mmol/L    Chloride 99 98 - 107 mmol/L    Carbon Dioxide (CO2) 22 22 - 29 mmol/L    Anion Gap 14 7 - 15 mmol/L    Urea Nitrogen 24.0 (H) 8.0 - 23.0 mg/dL    Creatinine 1.07 (H) 0.51 - 0.95 mg/dL    GFR Estimate 57 (L) >60 mL/min/1.73m2    Calcium 9.7 8.8 - 10.4 mg/dL    Glucose 114 (H) 70 - 99 mg/dL   Troponin T, High Sensitivity   Result Value Ref Range    Troponin T, High Sensitivity 11 <=14 ng/L   Nt probnp inpatient (BNP)   Result Value Ref Range    N terminal Pro BNP Inpatient 106 0 - 900 pg/mL   CBC with platelets and differential   Result Value Ref Range    WBC Count 9.9 4.0 - 11.0 10e3/uL    RBC Count 3.94 3.80 - 5.20 10e6/uL    Hemoglobin 12.0 11.7 - 15.7 g/dL    Hematocrit 36.8 35.0 - 47.0 %    MCV 93 78 - 100 fL    MCH 30.5 26.5 - 33.0 pg    MCHC 32.6 31.5 - 36.5 g/dL    RDW 12.6 10.0 - 15.0 %    Platelet Count 314 150 - 450 10e3/uL    % Neutrophils 68 %    % Lymphocytes 19 %    % Monocytes 10 %    % Eosinophils 2 %    % Basophils 0 %    % Immature Granulocytes 0 %    NRBCs per 100 WBC 0 <1 /100    Absolute Neutrophils 6.8 1.6 - 8.3 10e3/uL    Absolute Lymphocytes 1.9 0.8 - 5.3 10e3/uL    Absolute Monocytes 1.0 0.0 - 1.3 10e3/uL    Absolute Eosinophils 0.2 0.0 - 0.7 10e3/uL    Absolute Basophils 0.0 0.0 - 0.2 10e3/uL    Absolute Immature Granulocytes 0.0 <=0.4 10e3/uL    Absolute NRBCs 0.0 10e3/uL   RBC and Platelet Morphology   Result Value Ref Range    RBC Morphology Confirmed RBC Indices     Platelet Assessment  Automated Count Confirmed. Platelet morphology is normal.     Automated Count Confirmed. Platelet morphology is normal.    Stomatocytes Slight (A) None Seen   CTA Chest with Contrast    Narrative    CTA CHEST WITH CONTRAST  10/15/2024 2:31 PM    CLINICAL HISTORY: Female, age 64 years,  Persistent shortness of  breath and elevated D-dimer.;    Comparison:  No relevant prior imaging.    TECHNIQUE:  CT angiogram was performed of the chest with IV contrast.  Axial,  sagittal and coronal images were reviewed. 3-D MIP images were  used to optimize lung nodule conspicuity.   This facility minimizes radiation dose by adjusting the mA and/or kV  according to each patient size.  This CT scan was performed using one or more the following dose  reduction techniques:  -Automated exposure control,  -Adjustment of the mA and/or kV according to patient's size, and/or,  -Use of iterative reconstruction technique.      FINDINGS:   Excellent quality CTA demonstrates no evidence of acute abnormality of  the heart and great vessels. There is no evidence of pulmonary  embolus. The thoracic aorta is intact. Dense calcifications versus  stents are seen within the coronary arteries.    The thyroid gland and esophagus are unremarkable. Lymph nodes are  normal in size. Visualized portions of the upper abdomen are also  unremarkable. The lungs are clear. No evidence of consolidation.     Bony structures: No acute abnormality. Moderate to moderately severe  degenerative changes are seen throughout the thoracic spine.        Impression    IMPRESSION:   Negative CT angiogram of the chest.    Clear lungs. No evidence of acute abnormality.    ASHU MCKEON MD         SYSTEM ID:  [id number]       Medications   LORazepam (ATIVAN) tablet 1 mg (1 mg Oral $Given 10/15/24 1311)   sodium chloride (PF) 0.9% PF flush 100 mL (100 mLs Intravenous $Given 10/15/24 1431)   iopamidol (ISOVUE-370) solution 77 mL (77 mLs Intravenous $Given 10/15/24 1431)       Assessments & Plan (with Medical Decision Making)   Broad differential diagnosis is considered and includes but is not limited to acute coronary syndrome, pulmonary embolism, pneumothorax, thoracic arctic dissection, community acquired pneumonia, pericardial effusion, pericarditis, myocarditis, anemia, congestive heart failure.  Broad workup was initiated to include a multitude of serum testing as well as EKG, cross-sectional imaging of the chest with IV contrast.   No emergent features were found.  She has no evidence of pneumonia, pulmonary embolism, pneumothorax, or concerning features of CHF.  She remained vitally stable in the emergency department.  She had no evidence of hypoxia.  In looking back at her chart she has been worked up for dyspnea with exertion over the last few years.  This can be followed as an outpatient with her primary care provider as well as cardiology.  Strict return precautions were provided.  Close clinic follow-up was discussed.    Ms. Jaeger has also agreed to schedule a follow up appointment with her primary provider for re-evaluation and further management. She verbalized an understanding of the results of our workup and agrees with the complete discharge plan including instructions for return and follow up.  There is no indication for further investigation or treatment on an emergent basis.  There is no reasonably foreseeable injury that would be associated with discharge and close outpatient follow-up.      This document was prepared using a combination of typing and voice generated software.  While every attempt was made for accuracy, spelling and grammatical errors may exist.   I have reviewed the findings, diagnosis, plan and need for follow up with the patient.           Medical Decision Making  The patient's presentation was of moderate complexity (an undiagnosed new problem with uncertain prognosis).    The patient's evaluation involved:  ordering and/or review of 3+ test(s) in this encounter (multiple labs, EKG, CTA of the chest)    The patient's management necessitated moderate risk (prescription drug management including medications given in the ED).        New Prescriptions    No medications on file       Final diagnoses:   Dyspnea on exertion   Dizziness       10/15/2024   [facility]       Eric Moser PA-C  10/15/24 1035

## 2024-10-16 LAB
ATRIAL RATE - MUSE: 80 BPM
ATRIAL RATE - MUSE: 84 BPM
DIASTOLIC BLOOD PRESSURE - MUSE: NORMAL MMHG
DIASTOLIC BLOOD PRESSURE - MUSE: NORMAL MMHG
INTERPRETATION ECG - MUSE: NORMAL
INTERPRETATION ECG - MUSE: NORMAL
P AXIS - MUSE: 65 DEGREES
P AXIS - MUSE: 68 DEGREES
PR INTERVAL - MUSE: 196 MS
PR INTERVAL - MUSE: 208 MS
QRS DURATION - MUSE: 76 MS
QRS DURATION - MUSE: 80 MS
QT - MUSE: 350 MS
QT - MUSE: 362 MS
QTC - MUSE: 413 MS
QTC - MUSE: 417 MS
R AXIS - MUSE: 54 DEGREES
R AXIS - MUSE: 55 DEGREES
SYSTOLIC BLOOD PRESSURE - MUSE: NORMAL MMHG
SYSTOLIC BLOOD PRESSURE - MUSE: NORMAL MMHG
T AXIS - MUSE: 46 DEGREES
T AXIS - MUSE: 47 DEGREES
VENTRICULAR RATE- MUSE: 80 BPM
VENTRICULAR RATE- MUSE: 84 BPM

## 2024-10-28 DIAGNOSIS — I25.10 ASCVD (ARTERIOSCLEROTIC CARDIOVASCULAR DISEASE): ICD-10-CM

## 2024-10-28 DIAGNOSIS — F33.0 MILD EPISODE OF RECURRENT MAJOR DEPRESSIVE DISORDER (H): ICD-10-CM

## 2024-10-28 NOTE — TELEPHONE ENCOUNTER
Escitalopram (Lexapro) 10 mg tablet  Take 1 tablet by mouth one time a day.  Take along with 20 mg tablet for a total dose of 30 mg    Last Written Prescription Date:  4-19-24  Last Fill Quantity: 90 tablet,   # refills: 1  Last Office Visit: 10-15-24  Future Office visit:       Escitalopram (Lexapro) 20 mg tablet  Take 1 tablet by mouth one time a day.  Take along with 10 mg tablet for a total dose of 30 mg.  Last Written Prescription Date:  4-19-24  Last Fill Quantity: 90 tablet,   # refills: 1  Last Office Visit: 10-15-24  Future Office visit:       Atorvastatin (Lipitor) 40 mg tablet  Take 1 tablet by mouth one time a day     Last Written Prescription Date:  4-19-24  Last Fill Quantity: 90 tablet,   # refills: 1  Last Office Visit: 10-15-24  Future Office visit:

## 2024-10-29 RX ORDER — ATORVASTATIN CALCIUM 40 MG/1
TABLET, FILM COATED ORAL
Qty: 90 TABLET | Refills: 2 | Status: SHIPPED | OUTPATIENT
Start: 2024-10-29

## 2024-10-29 RX ORDER — ESCITALOPRAM OXALATE 20 MG/1
TABLET ORAL
Qty: 90 TABLET | Refills: 2 | Status: SHIPPED | OUTPATIENT
Start: 2024-10-29

## 2024-10-29 RX ORDER — ESCITALOPRAM OXALATE 10 MG/1
TABLET ORAL
Qty: 90 TABLET | Refills: 2 | Status: SHIPPED | OUTPATIENT
Start: 2024-10-29

## 2025-01-28 ENCOUNTER — ANCILLARY PROCEDURE (OUTPATIENT)
Dept: MAMMOGRAPHY | Facility: OTHER | Age: 65
End: 2025-01-28
Attending: NURSE PRACTITIONER
Payer: MEDICARE

## 2025-01-28 DIAGNOSIS — Z12.31 ENCOUNTER FOR SCREENING MAMMOGRAM FOR MALIGNANT NEOPLASM OF BREAST: ICD-10-CM

## 2025-01-28 DIAGNOSIS — Z85.3 PERSONAL HISTORY OF MALIGNANT NEOPLASM OF BREAST: ICD-10-CM

## 2025-01-28 PROCEDURE — 77063 BREAST TOMOSYNTHESIS BI: CPT | Mod: TC

## 2025-01-29 ENCOUNTER — TELEPHONE (OUTPATIENT)
Dept: MAMMOGRAPHY | Facility: OTHER | Age: 65
End: 2025-01-29

## 2025-03-11 ENCOUNTER — TELEPHONE (OUTPATIENT)
Dept: FAMILY MEDICINE | Facility: OTHER | Age: 65
End: 2025-03-11

## 2025-03-11 NOTE — TELEPHONE ENCOUNTER
I attempted to reach out to the patient and left a message to return call. We wanted to check if patient did follow through with breast MRI or had declined to do it in [place]. I did check on options closer to home if patient did not do previous MRI as ordered. In the past she requested Breast MRI with sedation and the only place available was in [place].    Options checked on as of today:    [facility] [phone] they have a traveling MRI machine which is a wide boar per [facility] weight limit 400 lb and they do not do IV sedation but provider could order oral anxiolytic medication.    [facility] [phone] they have a open sided MRI but can not do breast MRI. They have a open scan but can not do breast MRI in the open scan. They also have traditional MRI machine that is newer and slightly shorter scanner that can do breast MRI's. Weight limit 400- 500 lb. Patient would be in the prone position with a slight tilt. They do not do MRI sedation.  [facility] [phone]  and fax [phone] would need to order MRI breast bilateral with and with out contrast. I inquired about doing it under sedation and they have never done a breast MRI under sedation but they may be able to see anaesthesia could do a light sedation if it was placed in the order. Weight limit 450 - 500lb. Patient would prone position for MRI.      Patient returned call and states she did not have MRI breasts in [place] as they were short staffed and asked her to call back. Per patient she never called back to schedule and states it was to far to travel. I discussed above options and Birdie also spoke with patient. We will see her in June in [facility] - no labs. Would do a breast exam and order MRI as she states she would like to try [facility]. She states previously valium 5 mg did not help.  Birdie will order MRI and increase dose of valium at her appointment in June 24, 2025 when seen. Patient  agreeable to plan and was scheduled.    Darling Bain LPN

## 2025-04-01 DIAGNOSIS — J98.9 REACTIVE AIRWAY DISEASE WITHOUT ASTHMA: ICD-10-CM

## 2025-04-01 RX ORDER — ALBUTEROL SULFATE 90 UG/1
INHALANT RESPIRATORY (INHALATION)
Qty: 8.5 G | Refills: 0 | Status: SHIPPED | OUTPATIENT
Start: 2025-04-01

## 2025-06-19 DIAGNOSIS — F33.0 MILD EPISODE OF RECURRENT MAJOR DEPRESSIVE DISORDER: ICD-10-CM

## 2025-06-19 NOTE — TELEPHONE ENCOUNTER
escitalopram (LEXAPRO) 10 MG tablet       Last Written Prescription Date:  10/29/2024  Last Fill Quantity: 90,   # refills: 2  Last Office Visit: 10/15/2024  Future Office visit:    Next 5 appointments (look out 90 days)      Jun 24, 2025 10:00 AM  (Arrive by 9:45 AM)  Return Visit with Matilde Patrick NP  [facility] ([facility] ) [address] [place] [id number] [phone]           escitalopram (LEXAPRO) 20 MG tablet       Last Written Prescription Date:  10/29/2024  Last Fill Quantity: 90,   # refills: 2

## 2025-06-19 NOTE — TELEPHONE ENCOUNTER
Please schedule Medicare Annual Wellness Visit with PCP and return to refill pool.       [facility] Protocol [id number]/19/2025 11:12 AM   Protocol Details PHQ-9 score less than 5 in past 6 months              6/28/2023    11:18 AM 12/4/2023    10:29 AM 6/20/2024     9:48 AM   PHQ   PHQ-9 Total Score 3 3 1   Q9: Thoughts of better off dead/self-harm past 2 weeks Not at all  Not at all Not at all       Proxy-reported

## 2025-06-26 RX ORDER — ESCITALOPRAM OXALATE 20 MG/1
TABLET ORAL
Qty: 90 TABLET | Refills: 0 | Status: SHIPPED | OUTPATIENT
Start: 2025-06-26

## 2025-06-26 RX ORDER — ESCITALOPRAM OXALATE 10 MG/1
TABLET ORAL
Qty: 90 TABLET | Refills: 0 | Status: SHIPPED | OUTPATIENT
Start: 2025-06-26

## 2025-07-08 ENCOUNTER — ONCOLOGY VISIT (OUTPATIENT)
Dept: ONCOLOGY | Facility: OTHER | Age: 65
End: 2025-07-08
Attending: NURSE PRACTITIONER
Payer: MEDICARE

## 2025-07-08 VITALS
RESPIRATION RATE: 24 BRPM | HEART RATE: 97 BPM | DIASTOLIC BLOOD PRESSURE: 72 MMHG | TEMPERATURE: 97.3 F | OXYGEN SATURATION: 95 % | SYSTOLIC BLOOD PRESSURE: 134 MMHG | WEIGHT: 293 LBS | HEIGHT: 66 IN | BODY MASS INDEX: 47.09 KG/M2

## 2025-07-08 DIAGNOSIS — Z91.89 AT HIGH RISK FOR BREAST CANCER: ICD-10-CM

## 2025-07-08 DIAGNOSIS — Z12.31 VISIT FOR SCREENING MAMMOGRAM: Primary | ICD-10-CM

## 2025-07-08 DIAGNOSIS — D05.01 LOBULAR CARCINOMA IN SITU OF RIGHT BREAST: ICD-10-CM

## 2025-07-08 DIAGNOSIS — Z86.000 HISTORY OF DUCTAL CARCINOMA IN SITU (DCIS) OF BREAST: ICD-10-CM

## 2025-07-08 DIAGNOSIS — F40.240 CLAUSTROPHOBIA: ICD-10-CM

## 2025-07-08 DIAGNOSIS — Z80.3 FAMILY HISTORY OF MALIGNANT NEOPLASM OF BREAST: ICD-10-CM

## 2025-07-08 PROCEDURE — G0463 HOSPITAL OUTPT CLINIC VISIT: HCPCS

## 2025-07-08 RX ORDER — IBUPROFEN 200 MG
200 TABLET ORAL EVERY 4 HOURS PRN
COMMUNITY

## 2025-07-08 RX ORDER — ISOSORBIDE MONONITRATE 30 MG/1
30 TABLET, EXTENDED RELEASE ORAL DAILY
COMMUNITY
Start: 2025-04-25

## 2025-07-08 RX ORDER — DIAZEPAM 5 MG/1
5-10 TABLET ORAL
Qty: 2 TABLET | Refills: 0 | Status: SHIPPED | OUTPATIENT
Start: 2025-07-08

## 2025-07-08 ASSESSMENT — PAIN SCALES - GENERAL: PAINLEVEL_OUTOF10: MODERATE PAIN (4)

## 2025-07-08 NOTE — PROGRESS NOTES
High risk breast cancer follow-up Visit:  July 8, 2025    Reason for Visit:  Patient presents with:  Oncology Clinic Visit: Follow up Ductal carcinoma in situ (DCIS) of right breast       Nursing Note and documentation reviewed: yes    HPI:   This is a 65-year-old female patient who presents to the Oncology clinic today in follow-up of high risk breast cancer.   Last follow-up was completed in February 2024.  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  She was placed on tamoxifen 20 mg daily in March 2014 and completed 5 years of therapy discontinuing this in April 2019.  Plan was to go forth with breast cancer surveillance alternating mammogram with every 6 months.  She has been unable to have an MRI completed due to size.  Patient did not want to  travel to [place] to have this completed.  She has been undergoing annual mammogram.      Interim HX:    She had a fall this patient she presents to the clinic today stating she is doing pretty well other than ongoing issues with shortness of breath.  She has been seeing cardiology and they have done multiple tests with no etiology.  She states it is essentially unchanged.  She denies any concerns with the breast tissue including any new masses or skin changes.  Mammogram was completed in January 2025.  Note a referral was sent for genetics and they were unable to reach her to schedule.  She does question this again today.    Oncologic History:     Vandana had undergone an abnormal mammogram in November 2013 which showed microcalcifications suspicious for malignancy. She subsequently underwent stereotactic biopsy on November 21, 2013 with original interpretation of histopathology being that of hyperplasia without atypia.  This was then over read as part of routine protocol in January 2014.  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  She was  evaluated by Dr. Shanika Beaver in Gen. Surgery on 2014 and bilateral MRI of the breast was obtained which revealed oval-shaped nodular densities in both breasts consistent with inframammary lymph nodes.      The patient was seen by Dr. Chávez of radiation oncology on 2014 and his impression was that the patient had DCIS and would require definitive surgery.     The patient underwent excisional breast biopsy on 2014 and pathology showed LCIS, multifocal, completely excised and low-grade, grade 1, DCIS  which was completely excised.  Tumor size was at least 0.5 cm DCIS grade 1; margins were uninvolved by DCIS.  Estrogen receptor was positive at 90% and progesterone receptor was positive at 50%.  Patient was staged pathologic Tis associated with LCIS.  She was started on tamoxifen.     Previous TX:  Tamoxifen 20 mg daily started 2014 and completed 3/2019     Family History: No changes to family history; Father prostate cancer-unsure of age (thinks 50's) at diagnoses and  at 81 of GB; mom breast cancer in 50's and  at 71 of lung issue     Risk Assessment:       HRT:  none  Previous Breast Biopsies: 2013 pathology showed hyperplasia without atypia and reread in 2014 as DCIS with LCIS  Exercise: no special exercise program but is active  Diet:  states healthy-  Alcohol:  Yes-not daily-may have 2 in evening 4-5 days a week  Smoking: none  Age of 1st menses:  13  Age of first pregnancy:  16 miscarraged and 27 for second pregnancy  H/o Breastfeeding:  3 months  LMP:  In 30's     Genetic Counseling:   Referral was sent     Genetic Testing:  None    Past Medical History:   Diagnosis Date    Anxiety state, unspecified     Arthritis     ASCVD (arteriosclerotic cardiovascular disease) 2021    Benign essential hypertension 2015    Breast cancer ([facility]) 2014    DCIS, LOBULAR CARCINOMA  RIGHT BREAST    Contact dermatitis and other eczema, due to unspecified cause      Coronary artery disease     Depressive disorder     Ductal carcinoma in situ of right breast 2014    Ductal carcinoma in situ of right breast 2014    H/O herpes zoster     History of COVID-19 2021    Hx of heart artery stent 2021    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 2014    Morbid obesity (H) 2014    Other B-complex deficiencies 2014    Other specified disease of hair and hair follicles     Primary central sleep apnea 2016    Reactive airway disease that is not asthma      Problem list name updated by automated process. Provider to review    Recurrent major depressive disorder 2019    Uncomplicated asthma     Unspecified immunity deficiency     Urinary, incontinence, stress female 2021    Vitamin D deficiency 2016       Past Surgical History:   Procedure Laterality Date    BIOPSY      right breast--neg    BIOPSY BREAST Right 2014    DCIS, LOBULAR CARCINOMA    BIOPSY BREAST NEEDLE LOCALIZATION  2014    Procedure: BIOPSY BREAST NEEDLE LOCALIZATION;  WIRE LOCALIZED SEGMENTAL RESECTION, RIGHT BREAST;  Surgeon: Shanika Beaver MD;  Location: [facility]    CARDIAC SURGERY      stent    CHOLECYSTECTOMY      COLONOSCOPY      COLONOSCOPY  2013    DILATION AND CURETTAGE, OPERATIVE HYSTEROSCOPY, COMBINED  2017    [facility]    LUMPECTOMY BREAST Right 2014    DCIS, LOBULAR CARCINOMA    ORTHOPEDIC SURGERY      heel surgery    ORTHOPEDIC SURGERY      rt knee meniiscus tear       Family History   Problem Relation Age of Onset    Hypertension Mother     Cerebrovascular Disease Mother     Breast Cancer Mother 55         age 71 left mastectomy    C.A.D. Father     Coronary Artery Disease Father     Cerebrovascular Disease Father     Prostate Cancer Father     Thyroid Disease Sister     Breast Cancer Maternal Aunt     Thyroid Disease Other     Diabetes No family hx of     Asthma No family hx of     Hyperlipidemia No family hx of      Colon Cancer No family hx of     Other Cancer No family hx of     Anesthesia Reaction No family hx of     Genetic Disorder No family hx of        Social History     Socioeconomic History    Marital status: Single     Spouse name: Not on file    Number of children: Not on file    Years of education: Not on file    Highest education level: Not on file   Occupational History    Not on file   Tobacco Use    Smoking status: Former     Current packs/day: 0.00     Average packs/day: 0.1 packs/day for 38.5 years (3.9 ttl pk-yrs)     Types: Cigarettes     Start date: 8/15/1974     Quit date: 3/3/2013     Years since quittin.3     Passive exposure: Past    Smokeless tobacco: Never   Vaping Use    Vaping status: Never Used   Substance and Sexual Activity    Alcohol use: Yes    Drug use: No    Sexual activity: Yes     Partners: Male   Other Topics Concern    Parent/sibling w/ CABG, MI or angioplasty before 65F 55M? No   Social History Narrative    Not on file     Social Drivers of Health     Financial Resource Strain: Low Risk  (2024)    Financial Resource Strain     Within the past 12 months, have you or your family members you live with been unable to get utilities (heat, electricity) when it was really needed?: No   Food Insecurity: Low Risk  (2024)    Food Insecurity     Within the past 12 months, did you worry that your food would run out before you got money to buy more?: No     Within the past 12 months, did the food you bought just not last and you didn t have money to get more?: No   Transportation Needs: Low Risk  (2024)    Transportation Needs     Within the past 12 months, has lack of transportation kept you from medical appointments, getting your medicines, non-medical meetings or appointments, work, or from getting things that you need?: No   Physical Activity: Not on file   Stress: Not on file   Social Connections: Not on file   Interpersonal Safety: Low Risk  (2025)    Interpersonal  Safety     Do you feel physically and emotionally safe where you currently live?: Yes     Within the past 12 months, have you been hit, slapped, kicked or otherwise physically hurt by someone?: No     Within the past 12 months, have you been humiliated or emotionally abused in other ways by your partner or ex-partner?: No   Housing Stability: Low Risk  (7/24/2024)    Housing Stability     Do you have housing? : Yes     Are you worried about losing your housing?: No       Current Outpatient Medications   Medication Sig Dispense Refill    acetaminophen (TYLENOL) 650 MG CR tablet Take 650 mg by mouth every 8 hours as needed for mild pain or fever      albuterol (PROAIR HFA/PROVENTIL HFA/VENTOLIN HFA) 108 (90 Base) MCG/ACT inhaler Inhale 2 Puffs into the lungs every four hours as needed for shortness of breath / dyspnea or wheezing 8.5 g 0    amLODIPine (NORVASC) 10 MG tablet Take 10 mg by mouth daily      aspirin (ASA) 81 MG EC tablet Take 1 tablet (81 mg) by mouth daily 90 tablet 11    atorvastatin (LIPITOR) 40 MG tablet Take 1 Tablet by mouth one time a day. 90 tablet 0    calcium-vitamin D (CALTRATE) 600-400 MG-UNIT per tablet Take 1 tablet by mouth 2 times daily      carvedilol (COREG) 12.5 MG tablet Take 12.5 mg by mouth 2 times daily (with meals)      escitalopram (LEXAPRO) 10 MG tablet Take 1 Tablet by mouth one time a day. Take along with 20mg tablet for a total dose of 30mg. 90 tablet 0    escitalopram (LEXAPRO) 20 MG tablet Take 1 Tablet by mouth one time a day. Take along with 10 mg tablet for a total dose of 30 mg. 90 tablet 0    furosemide (LASIX) 80 MG tablet Take 80 mg by mouth 2 times daily      ibuprofen (ADVIL/MOTRIN) 200 MG tablet Take 200 mg by mouth every 4 hours as needed for pain.      isosorbide mononitrate (IMDUR) 30 MG 24 hr tablet Take 30 mg by mouth daily.      lisinopril (ZESTRIL) 40 MG tablet Take 1 Tablet by mouth one time a day. 90 tablet 2    magnesium oxide 400 MG CAPS Take 400 mg po  "daily and occasionally every other day      montelukast (SINGULAIR) 10 MG tablet Take 1 Tablet by mouth at bedtime. 90 tablet 2    Multiple Vitamin (DAILY MULTIVITAMIN PO) Take one tablet by oral route every day with food      nystatin (MYCOSTATIN) 338351 UNIT/GM external powder Apply 100,000 g topically 2 times daily as needed (Patient taking differently: Apply topically 2 times daily as needed.) 60 g 3    Omega-3 Fatty Acids (OMEGA 3 PO) Take 1,000 mg by mouth daily       potassium chloride ER (KLOR-CON M) 20 MEQ CR tablet Take 20 mEq by mouth daily      spironolactone (ALDACTONE) 25 MG tablet Take 1 tablet by mouth daily      vitamin B-12 (CYANOCOBALAMIN) 1000 MCG tablet Take 1,000 mcg by mouth daily 30 tablet     VITAMIN D, CHOLECALCIFEROL, PO Take 5,000 Units by mouth daily       nitroGLYcerin (NITROSTAT) 0.4 MG sublingual tablet Place 1 tablet (0.4 mg) under the tongue every 5 minutes as needed for chest pain (Patient not taking: Reported on 7/8/2025) 8 tablet 0     No current facility-administered medications for this visit.        Allergies   Allergen Reactions    Adhesive Tape      Steri strips left scar.    Latex      Possible allergy.       Review Of Systems: See HPI      Physical Exam:  /72   Pulse 97   Temp 97.3  F (36.3  C) (Tympanic)   Resp 24   Ht 1.676 m (5' 6\")   Wt 134.1 kg (295 lb 10.2 oz)   SpO2 95%   BMI 47.72 kg/m      GENERAL APPEARANCE: Healthy, alert and in no acute distress.  HEENT: Normocephalic, Sclerae anicteric.   NECK:   No asymmetry or masses, no thyromegaly.  LYMPHATICS: No palpable cervical, supraclavicular, axillary nodes   RESP: Lungs clear to auscultation bilaterally, respirations somewhat dyspneic at rest  CARDIOVASCULAR: Regular rate and rhythm. Normal S1, S2;  ABDOMEN: Soft, nontender. Bowel sounds auscultated all 4 quadrants. No palpable organomegaly or masses.  BREAST/Chest wall: Right breast: no dominant or suspicious mass, tenderness, or axillary adenopathy " and no skin abnormality or nipple discharge Left Breast: no dominant or suspicious mass, tenderness, or axillary adenopathy and no skin abnormality or nipple discharge ; breasts were examined in the supine and sitting position; note clinical breast exam limited due to large breast size  MUSCULOSKELETAL: Extremities without gross deformities noted.   SKIN: No suspicious lesions or rashes to exposed skin  NEURO: Alert and oriented x 3.  Gait steady.  PSYCHIATRIC: Mentation and affect appear normal.  Mood appropriate.    Laboratory:  None completed for today's visit    Imaging Studies: None completed for today's visit    Results for orders placed or performed in visit on 01/28/25   MA Screen Bilateral w/Sen    Narrative    BILATERAL FULL FIELD DIGITAL SCREENING MAMMOGRAM WITH TOMOSYNTHESIS    Performed on: 1/28/25    Compared to: 08/30/2023, 12/14/2021, 03/30/2021, and 01/24/2020    Technique:  This study was evaluated with the assistance of Computer-Aided   Detection.  Breast Tomosynthesis was used in interpretation.    Findings: There are scattered areas of fibroglandular density.  There is   no radiographic evidence of malignancy.     Impression    IMPRESSION: ACR BI-RADS Category 1: Negative    BREAST CANCER SCREENING RECOMMENDATION: Routine yearly mammography   beginning at age 40 or as discussed with your provider.    The results and recommendations of this examination will be communicated   to the patient.        Ray Rogers MD            ASSESSMENT/PLAN:    #1 high risk for breast cancer: History of DCIS and LCIS and underwent lumpectomy with complete excision and completed 5 years of tamoxifen therapy.  Plan was to go forth with high risk screening but this has been inconsistent due to inability to have MRI completed close to home.  She has been undergoing annual mammogram.  Patient would like to continue with high risk screening and we will plan to have this completed at the [facility]  this month.  We will call her with that result.  Will also place an order to have a mammogram completed in January and I will see her after for clinical breast exam and discuss plan.     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  She underwent lumpectomy and was placed on tamoxifen 20 mg daily and completed 5 years of therapy.       #2 history of LCIS: Lobular carcinoma in situ completely excised January 2014.  She completed 5 years of tamoxifen therapy as above.  Plan was to continue with high risk screening.        #3 family history of cancer: Number was again given for the cancer risk management group scheduling and she will contact them to schedule an appointment.   Discussed in depth.    I encouraged patient to call with any questions or concerns.    The longitudinal plan of care for the diagnosis(es)/condition(s) as documented were addressed during this visit. Due to the added complexity in care, I will continue to support Vandana in the subsequent management and with ongoing continuity of care.     35 minutes spent in the patient's encounter today with time spent in review of the chart along with in chart preparation.  Time was also spent obtaining a review of systems and performing a physical exam along with review of mammogram completed in January.  Time was also spent in discussion of ongoing high risk screening and recommendations.  We also spent time in discussion of my recommendation for genetic counseling visit.  Time was also spent in placing orders, discussing follow-up and in chart documentation.    Matilde Patrick, ANABELLE MCDOWELLN, FNP-BC, AOCNP

## 2025-07-08 NOTE — NURSING NOTE
"Oncology Rooming Note    July 8, 2025 10:55 AM   Vandana Jaeger is a 65 year old female who presents for:    Chief Complaint   Patient presents with    Oncology Clinic Visit     Follow up Ductal carcinoma in situ (DCIS) of right breast       Initial Vitals: /72   Pulse 97   Temp 97.3  F (36.3  C) (Tympanic)   Resp 24   Ht 1.676 m (5' 6\")   Wt 134.1 kg (295 lb 10.2 oz)   SpO2 95%   BMI 47.72 kg/m   Estimated body mass index is 47.72 kg/m  as calculated from the following:    Height as of this encounter: 1.676 m (5' 6\").    Weight as of this encounter: 134.1 kg (295 lb 10.2 oz). Body surface area is 2.5 meters squared.  Moderate Pain (4) Comment: Data Unavailable   No LMP recorded. Patient is postmenopausal.  Allergies reviewed: Yes  Medications reviewed: Yes    Medications: Medication refills not needed today.  Pharmacy name entered into [facility]: [facility] - [address] AT [facility]    Frailty Screening:   Is the patient here for a new oncology consult visit in cancer care? 2. No    PHQ9:  Did this patient require a PHQ9?: No            Darling Bain LPN             "

## 2025-07-14 ENCOUNTER — TELEPHONE (OUTPATIENT)
Dept: ONCOLOGY | Facility: OTHER | Age: 65
End: 2025-07-14

## 2025-07-14 ENCOUNTER — TELEPHONE (OUTPATIENT)
Dept: MRI IMAGING | Facility: HOSPITAL | Age: 65
End: 2025-07-14

## 2025-07-14 NOTE — TELEPHONE ENCOUNTER
I spoke with patient and they are unable to do the breast MRI at [facility] [place]  it does the MRI's does not carry needed equipment to do the Breast MRI. So they are unable to do it at [facility]. Patient states she does not want to travel to have MRI done. Call placed to  [facility] in our [place] [facility] department she states they can do MRI of both breasts and MRI machine is equipped to handle 500 lb, she states patient will be prone and possibly have to have her arms above her head, I did ask about IV sedation and they can do it if necessary but discourage as the patient is in a prone position. I did let patient know and she would like to proceed in [facility] and states she has the Valium ordered by [facility]. I explained she would need a ride due to taking valium, patient agreeable. They will call patient to schedule.  Darling Bain LPN

## 2025-07-14 NOTE — TELEPHONE ENCOUNTER
Went over breast history questionnaire with patient. Transferred to scheduling.     Shiloh VIDAL RN

## 2025-07-22 ENCOUNTER — OFFICE VISIT (OUTPATIENT)
Dept: FAMILY MEDICINE | Facility: OTHER | Age: 65
End: 2025-07-22
Attending: NURSE PRACTITIONER
Payer: MEDICARE

## 2025-07-22 VITALS
SYSTOLIC BLOOD PRESSURE: 110 MMHG | WEIGHT: 293 LBS | TEMPERATURE: 97 F | HEART RATE: 78 BPM | BODY MASS INDEX: 47.68 KG/M2 | DIASTOLIC BLOOD PRESSURE: 62 MMHG | RESPIRATION RATE: 22 BRPM | OXYGEN SATURATION: 98 %

## 2025-07-22 DIAGNOSIS — I25.10 CORONARY ARTERY DISEASE INVOLVING NATIVE CORONARY ARTERY OF NATIVE HEART WITHOUT ANGINA PECTORIS: ICD-10-CM

## 2025-07-22 DIAGNOSIS — R73.03 PREDIABETES: ICD-10-CM

## 2025-07-22 DIAGNOSIS — Z85.3 PERSONAL HISTORY OF MALIGNANT NEOPLASM OF BREAST: ICD-10-CM

## 2025-07-22 DIAGNOSIS — I10 ESSENTIAL HYPERTENSION: ICD-10-CM

## 2025-07-22 DIAGNOSIS — F41.9 ANXIETY: ICD-10-CM

## 2025-07-22 DIAGNOSIS — Z00.00 ENCOUNTER FOR MEDICARE ANNUAL WELLNESS EXAM: Primary | ICD-10-CM

## 2025-07-22 DIAGNOSIS — F33.1 MODERATE EPISODE OF RECURRENT MAJOR DEPRESSIVE DISORDER (H): ICD-10-CM

## 2025-07-22 DIAGNOSIS — E55.9 VITAMIN D DEFICIENCY: ICD-10-CM

## 2025-07-22 DIAGNOSIS — J31.0 CHRONIC RHINITIS: ICD-10-CM

## 2025-07-22 DIAGNOSIS — R23.9 ALTERATION IN SKIN INTEGRITY RELATED TO SURGICAL INCISION: ICD-10-CM

## 2025-07-22 DIAGNOSIS — Z95.5 HX OF HEART ARTERY STENT: ICD-10-CM

## 2025-07-22 PROBLEM — Z79.810 USE OF TAMOXIFEN (NOLVADEX): Status: RESOLVED | Noted: 2018-02-20 | Resolved: 2025-07-22

## 2025-07-22 LAB
ALBUMIN SERPL BCG-MCNC: 4.4 G/DL (ref 3.5–5.2)
ALBUMIN UR-MCNC: NEGATIVE MG/DL
ALP SERPL-CCNC: 82 U/L (ref 40–150)
ALT SERPL W P-5'-P-CCNC: 20 U/L (ref 0–50)
ANION GAP SERPL CALCULATED.3IONS-SCNC: 14 MMOL/L (ref 7–15)
APPEARANCE UR: CLEAR
AST SERPL W P-5'-P-CCNC: 16 U/L (ref 0–45)
BASOPHILS # BLD AUTO: 0 10E3/UL (ref 0–0.2)
BASOPHILS NFR BLD AUTO: 0 %
BILIRUB SERPL-MCNC: 0.6 MG/DL
BILIRUB UR QL STRIP: NEGATIVE
BUN SERPL-MCNC: 41.9 MG/DL (ref 8–23)
CALCIUM SERPL-MCNC: 9.8 MG/DL (ref 8.8–10.4)
CHLORIDE SERPL-SCNC: 96 MMOL/L (ref 98–107)
CHOLEST SERPL-MCNC: 157 MG/DL
COLOR UR AUTO: YELLOW
CREAT SERPL-MCNC: 1.5 MG/DL (ref 0.51–0.95)
EGFRCR SERPLBLD CKD-EPI 2021: 38 ML/MIN/1.73M2
EOSINOPHIL # BLD AUTO: 0.2 10E3/UL (ref 0–0.7)
EOSINOPHIL NFR BLD AUTO: 2 %
ERYTHROCYTE [DISTWIDTH] IN BLOOD BY AUTOMATED COUNT: 12.4 % (ref 10–15)
EST. AVERAGE GLUCOSE BLD GHB EST-MCNC: 105 MG/DL
FASTING STATUS PATIENT QL REPORTED: NO
FASTING STATUS PATIENT QL REPORTED: NO
GLUCOSE SERPL-MCNC: 105 MG/DL (ref 70–99)
GLUCOSE UR STRIP-MCNC: NEGATIVE MG/DL
HBA1C MFR BLD: 5.3 %
HCO3 SERPL-SCNC: 22 MMOL/L (ref 22–29)
HCT VFR BLD AUTO: 36.4 % (ref 35–47)
HDLC SERPL-MCNC: 48 MG/DL
HGB BLD-MCNC: 12.3 G/DL (ref 11.7–15.7)
HGB UR QL STRIP: NEGATIVE
IMM GRANULOCYTES # BLD: 0 10E3/UL
IMM GRANULOCYTES NFR BLD: 0 %
KETONES UR STRIP-MCNC: NEGATIVE MG/DL
LDLC SERPL CALC-MCNC: 67 MG/DL
LEUKOCYTE ESTERASE UR QL STRIP: NEGATIVE
LYMPHOCYTES # BLD AUTO: 2.3 10E3/UL (ref 0.8–5.3)
LYMPHOCYTES NFR BLD AUTO: 25 %
MCH RBC QN AUTO: 31.1 PG (ref 26.5–33)
MCHC RBC AUTO-ENTMCNC: 33.8 G/DL (ref 31.5–36.5)
MCV RBC AUTO: 92 FL (ref 78–100)
MONOCYTES # BLD AUTO: 0.8 10E3/UL (ref 0–1.3)
MONOCYTES NFR BLD AUTO: 9 %
NEUTROPHILS # BLD AUTO: 5.7 10E3/UL (ref 1.6–8.3)
NEUTROPHILS NFR BLD AUTO: 63 %
NITRATE UR QL: NEGATIVE
NONHDLC SERPL-MCNC: 109 MG/DL
PH UR STRIP: 5.5 [PH] (ref 5–7)
PLATELET # BLD AUTO: 402 10E3/UL (ref 150–450)
POTASSIUM SERPL-SCNC: 5.7 MMOL/L (ref 3.4–5.3)
PROT SERPL-MCNC: 7.8 G/DL (ref 6.4–8.3)
RBC # BLD AUTO: 3.95 10E6/UL (ref 3.8–5.2)
SODIUM SERPL-SCNC: 132 MMOL/L (ref 135–145)
SP GR UR STRIP: 1.01 (ref 1–1.03)
TRIGL SERPL-MCNC: 208 MG/DL
TSH SERPL DL<=0.005 MIU/L-ACNC: 1.78 UIU/ML (ref 0.3–4.2)
UROBILINOGEN UR STRIP-ACNC: 0.2 E.U./DL
WBC # BLD AUTO: 9 10E3/UL (ref 4–11)

## 2025-07-22 PROCEDURE — 36415 COLL VENOUS BLD VENIPUNCTURE: CPT | Mod: ZL | Performed by: NURSE PRACTITIONER

## 2025-07-22 PROCEDURE — 81003 URINALYSIS AUTO W/O SCOPE: CPT | Mod: ZL | Performed by: NURSE PRACTITIONER

## 2025-07-22 PROCEDURE — 80061 LIPID PANEL: CPT | Mod: ZL | Performed by: NURSE PRACTITIONER

## 2025-07-22 PROCEDURE — 84443 ASSAY THYROID STIM HORMONE: CPT | Mod: ZL | Performed by: NURSE PRACTITIONER

## 2025-07-22 PROCEDURE — 83036 HEMOGLOBIN GLYCOSYLATED A1C: CPT | Mod: ZL | Performed by: NURSE PRACTITIONER

## 2025-07-22 PROCEDURE — 85004 AUTOMATED DIFF WBC COUNT: CPT | Mod: ZL | Performed by: NURSE PRACTITIONER

## 2025-07-22 PROCEDURE — 84155 ASSAY OF PROTEIN SERUM: CPT | Mod: ZL | Performed by: NURSE PRACTITIONER

## 2025-07-22 PROCEDURE — G0463 HOSPITAL OUTPT CLINIC VISIT: HCPCS

## 2025-07-22 PROCEDURE — 82306 VITAMIN D 25 HYDROXY: CPT | Mod: ZL | Performed by: NURSE PRACTITIONER

## 2025-07-22 RX ORDER — MONTELUKAST SODIUM 10 MG/1
1 TABLET ORAL AT BEDTIME
Qty: 90 TABLET | Refills: 2 | Status: SHIPPED | OUTPATIENT
Start: 2025-07-22

## 2025-07-22 RX ORDER — NITROGLYCERIN 0.4 MG/1
0.4 TABLET SUBLINGUAL EVERY 5 MIN PRN
Qty: 8 TABLET | Refills: 0 | Status: SHIPPED | OUTPATIENT
Start: 2025-07-22

## 2025-07-22 SDOH — HEALTH STABILITY: PHYSICAL HEALTH: ON AVERAGE, HOW MANY MINUTES DO YOU ENGAGE IN EXERCISE AT THIS LEVEL?: 0 MIN

## 2025-07-22 SDOH — HEALTH STABILITY: PHYSICAL HEALTH: ON AVERAGE, HOW MANY DAYS PER WEEK DO YOU ENGAGE IN MODERATE TO STRENUOUS EXERCISE (LIKE A BRISK WALK)?: 0 DAYS

## 2025-07-22 ASSESSMENT — PATIENT HEALTH QUESTIONNAIRE - PHQ9
10. IF YOU CHECKED OFF ANY PROBLEMS, HOW DIFFICULT HAVE THESE PROBLEMS MADE IT FOR YOU TO DO YOUR WORK, TAKE CARE OF THINGS AT HOME, OR GET ALONG WITH OTHER PEOPLE: NOT DIFFICULT AT ALL
SUM OF ALL RESPONSES TO PHQ QUESTIONS 1-9: 3
SUM OF ALL RESPONSES TO PHQ QUESTIONS 1-9: 3

## 2025-07-22 ASSESSMENT — ANXIETY QUESTIONNAIRES
GAD7 TOTAL SCORE: 0
2. NOT BEING ABLE TO STOP OR CONTROL WORRYING: NOT AT ALL
3. WORRYING TOO MUCH ABOUT DIFFERENT THINGS: NOT AT ALL
5. BEING SO RESTLESS THAT IT IS HARD TO SIT STILL: NOT AT ALL
GAD7 TOTAL SCORE: 0
6. BECOMING EASILY ANNOYED OR IRRITABLE: NOT AT ALL
1. FEELING NERVOUS, ANXIOUS, OR ON EDGE: NOT AT ALL
GAD7 TOTAL SCORE: 0
7. FEELING AFRAID AS IF SOMETHING AWFUL MIGHT HAPPEN: NOT AT ALL
4. TROUBLE RELAXING: NOT AT ALL
7. FEELING AFRAID AS IF SOMETHING AWFUL MIGHT HAPPEN: NOT AT ALL

## 2025-07-22 ASSESSMENT — SOCIAL DETERMINANTS OF HEALTH (SDOH): HOW OFTEN DO YOU GET TOGETHER WITH FRIENDS OR RELATIVES?: ONCE A WEEK

## 2025-07-22 ASSESSMENT — PAIN SCALES - GENERAL: PAINLEVEL_OUTOF10: MODERATE PAIN (4)

## 2025-07-22 NOTE — PATIENT INSTRUCTIONS
Assessment & Plan           Encounter for Medicare annual wellness exam  - CBC with platelets and differential  - UA Macroscopic with reflex to Microscopic and Culture      Prediabetes - stable  - Hemoglobin A1c      Essential hypertension - stable  - Continue medication as directed  - Low sodium diet  - Comprehensive metabolic panel  - Lipid Profile (Chol, Trig, HDL, LDL calc)  - TSH with free T4 reflex      Coronary artery disease involving native coronary artery of native heart without angina pectoris- stable  - Continue cardiology Follow-up and plan of care  - nitroGLYcerin (NITROSTAT) 0.4 MG sublingual tablet; Place 1 tablet (0.4 mg) under the tongue every 5 minutes as needed for chest pain.  - Comprehensive metabolic panel  - Lipid Profile (Chol, Trig, HDL, LDL calc)      Hx of heart artery stent  - Comprehensive metabolic panel      Moderate episode of recurrent major depressive disorder (H)  - Continue Lexapro as directed      Anxiety  - Continue Lexapro as directed      Vitamin D deficiency  - Vitamin D level ordered      Chronic rhinitis - active  - montelukast (SINGULAIR) 10 MG tablet; Take 1 tablet (10 mg) by mouth at bedtime.      Personal history of malignant neoplasm of breast  - Continue Follow-up with Oncology and imaging as ordered      Alteration in skin integrity related to surgical incision - active  - Adult Gen Surg  Referral        Patient has been advised of split billing requirements and indicates understanding: Yes          Follow-up 6 months        Please continue to take all medication as directed  Please notify your pharmacy or our refill line of future refills needed.  Please return sooner than your next scheduled appointment with any concerns.      When your lab results return, we will call you with abnormal findings  You can also view this information in your MyChart, if you have an account.  Please call or Wingzt message us with any questions you may have.          Jackie  [facility]  [phone]       Preventive Care Advice   This is general advice given by our system to help you stay healthy. However, your care team may have specific advice just for you. Please talk to your care team about your preventive care needs.  Nutrition  Eat 5 or more servings of fruits and vegetables each day.  Try wheat bread, brown rice and whole grain pasta (instead of white bread, rice, and pasta).  Get enough calcium and vitamin D. Check the label on foods and aim for 100% of the RDA (recommended daily allowance).  Lifestyle  Exercise at least 150 minutes each week  (30 minutes a day, 5 days a week).  Do muscle strengthening activities 2 days a week. These help control your weight and prevent disease.  No smoking.  Wear sunscreen to prevent skin cancer.  Have a dental exam and cleaning every 6 months.  Yearly exams  See your health care team every year to talk about:  Any changes in your health.  Any medicines your care team has prescribed.  Preventive care, family planning, and ways to prevent chronic diseases.  Shots (vaccines)   HPV shots (up to age 26), if you've never had them before.  Hepatitis B shots (up to age 59), if you've never had them before.  COVID-19 shot: Get this shot when it's due.  Flu shot: Get a flu shot every year.  Tetanus shot: Get a tetanus shot every 10 years.  Pneumococcal, hepatitis A, and RSV shots: Ask your care team if you need these based on your risk.  Shingles shot (for age 50 and up)  General health tests  Diabetes screening:  Starting at age 35, Get screened for diabetes at least every 3 years.  If you are younger than age 35, ask your care team if you should be screened for diabetes.  Cholesterol test: At age 39, start having a cholesterol test every 5 years, or more often if advised.  Bone density scan (DEXA): At age 50, ask your care team if you should have this scan for osteoporosis (brittle bones).  Hepatitis C: Get tested at least once in your life.  STIs  (sexually transmitted infections)  Before age 24: Ask your care team if you should be screened for STIs.  After age 24: Get screened for STIs if you're at risk. You are at risk for STIs (including HIV) if:  You are sexually active with more than one person.  You don't use condoms every time.  You or a partner was diagnosed with a sexually transmitted infection.  If you are at risk for HIV, ask about PrEP medicine to prevent HIV.  Get tested for HIV at least once in your life, whether you are at risk for HIV or not.  Cancer screening tests  Cervical cancer screening: If you have a cervix, begin getting regular cervical cancer screening tests starting at age 21.  Breast cancer scan (mammogram): If you've ever had breasts, begin having regular mammograms starting at age 40. This is a scan to check for breast cancer.  Colon cancer screening: It is important to start screening for colon cancer at age 45.  Have a colonoscopy test every 10 years (or more often if you're at risk) Or, ask your provider about stool tests like a FIT test every year or Cologuard test every 3 years.  To learn more about your testing options, visit:   .  For help making a decision, visit:   https://bit.ly/oc53455.  Prostate cancer screening test: If you have a prostate, ask your care team if a prostate cancer screening test (PSA) at age 55 is right for you.  Lung cancer screening: If you are a current or former smoker ages 50 to 80, ask your care team if ongoing lung cancer screenings are right for you.  For informational purposes only. Not to replace the advice of your health care provider. Copyright   2023 [facility]. All rights reserved. Clinically reviewed by the [facility]. [employer] 399506 - REV 01/24.  Learning About Sleeping Well  What does sleeping well mean?     Sleeping well means getting enough sleep to feel good and stay healthy. How much sleep is enough varies among people.  The number of  hours you sleep and how you feel when you wake up are both important. If you do not feel refreshed, you probably need more sleep. Another sign of not getting enough sleep is feeling tired during the day.  Experts recommend that adults get at least 7 or more hours of sleep per day. Children and older adults need more sleep.  Why is getting enough sleep important?  Getting enough quality sleep is a basic part of good health. When your sleep suffers, your physical health, mood, and your thoughts can suffer too. You may find yourself feeling more grumpy or stressed. Not getting enough sleep also can lead to serious problems, including injury, accidents, anxiety, and depression.  What might cause poor sleeping?  Many things can cause sleep problems, including:  Changes to your sleep schedule.  Stress. Stress can be caused by fear about a single event, such as giving a speech. Or you may have ongoing stress, such as worry about work or school.  Depression, anxiety, and other mental or emotional conditions.  Changes in your sleep habits or surroundings. This includes changes that happen where you sleep, such as noise, light, or sleeping in a different bed. It also includes changes in your sleep pattern, such as having jet lag or working a late shift.  Health problems, such as pain, breathing problems, and restless legs syndrome.  Lack of regular exercise.  Using alcohol, nicotine, or caffeine before bed.  How can you help yourself?  Here are some tips that may help you sleep more soundly and wake up feeling more refreshed.  Your sleeping area   Use your bedroom only for sleeping and sex. A bit of light reading may help you fall asleep. But if it doesn't, do your reading elsewhere in the house. Try not to use your TV, computer, smartphone, or tablet while you are in bed.  Be sure your bed is big enough to stretch out comfortably, especially if you have a sleep partner.  Keep your bedroom quiet, dark, and cool. Use curtains,  "blinds, or a sleep mask to block out light. To block out noise, use earplugs, soothing music, or a \"white noise\" machine.  Your evening and bedtime routine   Create a relaxing bedtime routine. You might want to take a warm shower or bath, or listen to soothing music.  Go to bed at the same time every night. And get up at the same time every morning, even if you feel tired.  What to avoid   Limit caffeine (coffee, tea, caffeinated sodas) during the day, and don't have any for at least 6 hours before bedtime.  Avoid drinking alcohol before bedtime. Alcohol can cause you to wake up more often during the night.  Try not to smoke or use tobacco, especially in the evening. Nicotine can keep you awake.  Limit naps during the day, especially close to bedtime.  Avoid lying in bed awake for too long. If you can't fall asleep or if you wake up in the middle of the night and can't get back to sleep within about 20 minutes, get out of bed and go to another room until you feel sleepy.  Avoid taking medicine right before bed that may keep you awake or make you feel hyper or energized. Your doctor can tell you if your medicine may do this and if you can take it earlier in the day.  If you can't sleep   Imagine yourself in a peaceful, pleasant scene. Focus on the details and feelings of being in a place that is relaxing.  Get up and do a quiet or boring activity until you feel sleepy.  Avoid drinking any liquids before going to bed to help prevent waking up often to use the bathroom.  Where can you learn more?  Go to https://www.[employer].net/patiented  Enter J942 in the search box to learn more about \"Learning About Sleeping Well.\"  Current as of: July 31, 2024  Content Version: 14.5 2024-2025 [employer].   Care instructions adapted under license by your healthcare professional. If you have questions about a medical condition or this instruction, always ask your healthcare professional. [employer] " disclaims any warranty or liability for your use of this information.

## 2025-07-22 NOTE — PROGRESS NOTES
Preventive Care Visit  RANGE ANDRE Mejia Antolinmireya, MAXX, Family Medicine        Jul 22, 2025          Assessment & Plan           Encounter for Medicare annual wellness exam  - CBC with platelets and differential  - UA Macroscopic with reflex to Microscopic and Culture      Prediabetes - stable  - Hemoglobin A1c      Essential hypertension - stable  - Continue medication as directed  - Low sodium diet  - Comprehensive metabolic panel  - Lipid Profile (Chol, Trig, HDL, LDL calc)  - TSH with free T4 reflex      Coronary artery disease involving native coronary artery of native heart without angina pectoris- stable  - Continue cardiology Follow-up and plan of care  - nitroGLYcerin (NITROSTAT) 0.4 MG sublingual tablet; Place 1 tablet (0.4 mg) under the tongue every 5 minutes as needed for chest pain.  - Comprehensive metabolic panel  - Lipid Profile (Chol, Trig, HDL, LDL calc)      Hx of heart artery stent  - Comprehensive metabolic panel      Moderate episode of recurrent major depressive disorder (H)  - Continue Lexapro as directed      Anxiety  - Continue Lexapro as directed      Vitamin D deficiency  - Vitamin D level ordered      Chronic rhinitis - active  - montelukast (SINGULAIR) 10 MG tablet; Take 1 tablet (10 mg) by mouth at bedtime.      Personal history of malignant neoplasm of breast  - Continue Follow-up with Oncology and imaging as ordered      Alteration in skin integrity related to surgical incision - active  - Adult Gen Surg  Referral        Patient has been advised of split billing requirements and indicates understanding: Yes          Follow-up 6 months        Please continue to take all medication as directed  Please notify your pharmacy or our refill line of future refills needed.  Please return sooner than your next scheduled appointment with any concerns.      When your lab results return, we will call you with abnormal findings  You can also view this information in your MyChart, if you  have an account.  Please call or [employer] message us with any questions you may have.          Jackie Hitchcock [facility]  [phone]           Vandana is a 65 year old, presenting for the following:  Wellness Visit           Abdominal incisional irritation and wound  Noted 4 months ago, prior  incision from 38 years ago became irritated, tissue is erythematous, incision appears to have opened a bit.  Are bleeds at times, no other drainage        Hypertension Follow-up  Do you check your blood pressure regularly outside of the clinic? No   Are you following a low salt diet? Yes  Are your blood pressures ever more than 140 on the top number (systolic) OR more   than 90 on the bottom number (diastolic), for example 140/90? No          BP Readings from Last 2 Encounters:   25 110/62   25 134/72           Coronary Vascular Disease Follow-up  Follows with Cardiology at [facility]  How often do you take nitroglycerin? Never  Do you take an aspirin every day? Yes          Heart Failure Follow-up   Are you experiencing any shortness of breath? Yes, with activity only     How would you describe your shortness of breath?  Same as usual  Are you experiencing any swelling in your legs or feet?  No  Are you using more pillows than usual? No  Do you cough at night?  No  Do you check your weight daily?  No  Have you had a weight change recently?  No  Are you having any of the following side effects from your medications? (Select all that apply)  The patient does not report symptoms of dizziness, fatigue, cough, swelling, or slow heart beat.  Since your last visit, how many times have you gone to the cardiologist, urgent care, emergency room, or hospital because of your heart failure?   1 time  Last Echo: No results found.          Depression and Anxiety   How are you doing with your depression since your last visit? No change  How are you doing with your anxiety since your last visit?  No change  Are you having other  symptoms that might be associated with depression or anxiety? No  Have you had a significant life event? No   Do you have any concerns with your use of alcohol or other drugs? No          Personal history of breast cancer  Follows with Oncology, visit [facility]      Oncologic History:      Vandana had undergone an abnormal mammogram in November 2013 which showed microcalcifications suspicious for malignancy. She subsequently underwent stereotactic biopsy on November 21, 2013 with original interpretation of histopathology being that of hyperplasia without atypia.  This was then over read as part of routine protocol in January 2014.  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  She was evaluated by Dr. Shanika Beaver in Gen. Surgery on January 28, 2014 and bilateral MRI of the breast was obtained which revealed oval-shaped nodular densities in both breasts consistent with inframammary lymph nodes.      The patient was seen by Dr. Chávez of radiation oncology on 1/31/2014 and his impression was that the patient had DCIS and would require definitive surgery.     The patient underwent excisional breast biopsy on 2/13/2014 and pathology showed LCIS, multifocal, completely excised and low-grade, grade 1, DCIS  which was completely excised.  Tumor size was at least 0.5 cm DCIS grade 1; margins were uninvolved by DCIS.  Estrogen receptor was positive at 90% and progesterone receptor was positive at 50%.  Patient was staged pathologic Tis associated with LCIS.  She was started on tamoxifen.     Previous TX:  Tamoxifen 20 mg daily started March 2014 and completed 3/2019          ASSESSMENT/PLAN:     #1 high risk for breast cancer: History of DCIS and LCIS and underwent lumpectomy with complete excision and completed 5 years of tamoxifen therapy.  Plan was to go forth with high risk screening but this has been inconsistent due to inability to have MRI completed close to home.  She has been undergoing  annual mammogram.  Patient would like to continue with high risk screening and we will plan to have this completed at the [facility] this month.  We will call her with that result.  Will also place an order to have a mammogram completed in January and I will see her after for clinical breast exam and discuss plan.     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  She underwent lumpectomy and was placed on tamoxifen 20 mg daily and completed 5 years of therapy.       #2 history of LCIS: Lobular carcinoma in situ completely excised January 2014.  She completed 5 years of tamoxifen therapy as above.  Plan was to continue with high risk screening.        #3 family history of cancer: Number was again given for the cancer risk management group scheduling and she will contact them to schedule an appointment.   Discussed in depth.     I encouraged patient to call with any questions or concerns.     The longitudinal plan of care for the diagnosis(es)/condition(s) as documented were addressed during this visit. Due to the added complexity in care, I will continue to support Vandana in the subsequent management and with ongoing continuity of care.      35 minutes spent in the patient's encounter today with time spent in review of the chart along with in chart preparation.  Time was also spent obtaining a review of systems and performing a physical exam along with review of mammogram completed in January.  Time was also spent in discussion of ongoing high risk screening and recommendations.  We also spent time in discussion of my recommendation for genetic counseling visit.  Time was also spent in placing orders, discussing follow-up and in chart documentation.     Matilde Patrick, NP  APRN, FNP-BC, AOCNP             Social History     Tobacco Use    Smoking status: Former     Current packs/day: 0.00      Average packs/day: 0.1 packs/day for 38.5 years (3.9 ttl pk-yrs)     Types: Cigarettes     Start date: 8/15/1974     Quit date: 3/3/2013     Years since quittin.3     Passive exposure: Past    Smokeless tobacco: Never   Vaping Use    Vaping status: Never Used   Substance Use Topics    Alcohol use: Yes    Drug use: No             2023    10:29 AM 2024     9:48 AM 2025     1:10 PM   PHQ   PHQ-9 Total Score 3 1 3    Q9: Thoughts of better off dead/self-harm past 2 weeks Not at all Not at all Not at all       Patient-reported             2023    10:00 AM 2024     9:55 AM 2025     1:21 PM   REUBEN-7 SCORE   Total Score  0 (minimal anxiety) 0 (minimal anxiety)   Total Score 5 0 0        Patient-reported             2025     1:10 PM   Last PHQ-9   1.  Little interest or pleasure in doing things 0   2.  Feeling down, depressed, or hopeless 0   3.  Trouble falling or staying asleep, or sleeping too much 2   4.  Feeling tired or having little energy 1   5.  Poor appetite or overeating 0   6.  Feeling bad about yourself 0   7.  Trouble concentrating 0   8.  Moving slowly or restless 0   Q9: Thoughts of better off dead/self-harm past 2 weeks 0   PHQ-9 Total Score 3        Patient-reported             2025     1:21 PM   REUBEN-7    1. Feeling nervous, anxious, or on edge 0   2. Not being able to stop or control worrying 0   3. Worrying too much about different things 0   4. Trouble relaxing 0   5. Being so restless that it is hard to sit still 0   6. Becoming easily annoyed or irritable 0   7. Feeling afraid, as if something awful might happen 0   REUBEN-7 Total Score 0        Patient-reported           Vitamin D deficiency  Lab due  Takes OTC D3        Allergic Rhinitis  - Allergies have flared up this summer          Advance Care Planning  Discussed advance care planning with patient; however, patient declined at this time.            2025   General Health   How would you rate your  overall physical health? (!) FAIR   Feel stress (tense, anxious, or unable to sleep) Not at all             7/22/2025   Nutrition   Diet: Low salt             7/22/2025   Exercise   Days per week of moderate/strenous exercise 0 days   Average minutes spent exercising at this level 0 min   (!) EXERCISE CONCERN            7/22/2025   Social Factors   Frequency of gathering with friends or relatives Once a week   Worry food won't last until get money to buy more No   Food not last or not have enough money for food? No   Do you have housing? (Housing is defined as stable permanent housing and does not include staying outside in a car, in a tent, in an abandoned building, in an overnight shelter, or couch-surfing.) Yes   Are you worried about losing your housing? No   Lack of transportation? No   Unable to get utilities (heat,electricity)? No             7/22/2025   Fall Risk   Fallen 2 or more times in the past year? No    Trouble with walking or balance? No        Proxy-reported                7/22/2025   Activities of Daily Living- Home Safety   Needs help with the following daily activites None of the above   Safety concerns in the home None of the above             7/22/2025   Dental   Dentist two times every year? (!) NO             7/22/2025   Hearing Screening   Hearing concerns? None of the above             7/22/2025   Driving Risk Screening   Patient/family members have concerns about driving No             7/22/2025   General Alertness/Fatigue Screening   Have you been more tired than usual lately? (!) YES             7/22/2025   Urinary Incontinence Screening   Bothered by leaking urine in past 6 months No           Today's PHQ-9 Score:       7/22/2025     1:10 PM   PHQ-9 SCORE   PHQ-9 Total Score MyChart 3 (Minimal depression)   PHQ-9 Total Score 3        Patient-reported               7/22/2025   Substance Use   Alcohol more than 3/day or more than 7/wk No   Do you have a current opioid prescription? No    How severe/bad is pain from 1 to 10? 4/10   Do you use any other substances recreationally? No           Social History     Tobacco Use    Smoking status: Former     Current packs/day: 0.00     Average packs/day: 0.1 packs/day for 38.5 years (3.9 ttl pk-yrs)     Types: Cigarettes     Start date: 8/15/1974     Quit date: 3/3/2013     Years since quittin.3     Passive exposure: Past    Smokeless tobacco: Never   Vaping Use    Vaping status: Never Used   Substance Use Topics    Alcohol use: Yes    Drug use: No               2025   LAST FHS-7 RESULTS   1st degree relative breast or ovarian cancer Yes   Any relative bilateral breast cancer No   Any male have breast cancer No   Any ONE woman have BOTH breast AND ovarian cancer No   Any woman with breast cancer before 50yrs No   2 or more relatives with breast AND/OR ovarian cancer Yes   2 or more relatives with breast AND/OR bowel cancer Yes         Mammo UTD - scheduled        History of abnormal Pap smear: No - age 65 or older with adequate negative prior screening test results (3 consecutive negative cytology results, 2 consecutive negative cotesting results, or 2 consecutive negative HrHPV test results within 10 years, with the most recent test occurring within the recommended screening interval for the test used)            10/13/2021    10:11 AM 3/6/2017    12:00 AM 2016    12:00 AM   PAP / HPV   PAP Negative for Intraepithelial Lesion or Malignancy (NILM)      PAP (Historical)   NIL    PAP-ABSTRACT  See Scanned Document           ASCVD Risk   The 10-year ASCVD risk score (Jerald BARNES, et al., 2019) is: 5.4%    Values used to calculate the score:      Age: 65 years      Sex: Female      Is Non- : No      Diabetic: No      Tobacco smoker: No      Systolic Blood Pressure: 110 mmHg      Is BP treated: Yes      HDL Cholesterol: 64 mg/dL      Total Cholesterol: 169 mg/dL                        Past Medical History:    Diagnosis Date    Anxiety state, unspecified     Arthritis     ASCVD (arteriosclerotic cardiovascular disease) 2021    Benign essential hypertension 2015    Breast cancer (H) 2014    DCIS, LOBULAR CARCINOMA  RIGHT BREAST    Contact dermatitis and other eczema, due to unspecified cause     Coronary artery disease     Depressive disorder     Ductal carcinoma in situ of right breast 2014    Ductal carcinoma in situ of right breast 2014    H/O herpes zoster     History of COVID-19 2021    Hx of heart artery stent 2021    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 2014    Morbid obesity (H) 2014    Other B-complex deficiencies 2014    Other specified disease of hair and hair follicles     Primary central sleep apnea 2016    Reactive airway disease that is not asthma      Problem list name updated by automated process. Provider to review    Recurrent major depressive disorder 2019    Uncomplicated asthma     Unspecified immunity deficiency     Urinary, incontinence, stress female 2021    Vitamin D deficiency 2016           Past Surgical History:   Procedure Laterality Date    BIOPSY      right breast--neg    BIOPSY BREAST Right 2014    DCIS, LOBULAR CARCINOMA    BIOPSY BREAST NEEDLE LOCALIZATION  2014    Procedure: BIOPSY BREAST NEEDLE LOCALIZATION;  WIRE LOCALIZED SEGMENTAL RESECTION, RIGHT BREAST;  Surgeon: Shanika Beaver MD;  Location: [facility] OR    CARDIAC SURGERY      stent    CHOLECYSTECTOMY      COLONOSCOPY      COLONOSCOPY  2013    DILATION AND CURETTAGE, OPERATIVE HYSTEROSCOPY, COMBINED  2017    [facility]    LUMPECTOMY BREAST Right 2014    DCIS, LOBULAR CARCINOMA    ORTHOPEDIC SURGERY      heel surgery    ORTHOPEDIC SURGERY      rt knee meniiscus tear         OB History    Para Term  AB Living   1 1 1 0 0 0   SAB IAB Ectopic Multiple Live Births   0 0 0 0 0      # Outcome Date GA Lbr  Gabriel/2nd Weight Sex Type Anes PTL Lv   1 Term                  Lab work is in process  Labs reviewed in EPIC  BP Readings from Last 3 Encounters:   07/22/25 110/62   07/08/25 134/72   10/15/24 119/93    Wt Readings from Last 3 Encounters:   07/22/25 134 kg (295 lb 6.4 oz)   07/08/25 134.1 kg (295 lb 10.2 oz)   10/15/24 133.5 kg (294 lb 6.4 oz)                  Patient Active Problem List   Diagnosis    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    Anxiety    Reactive airway disease that is not asthma    Contact dermatitis and other eczema, due to unspecified cause    Essential hypertension    Vitamin D deficiency    ACP (advance care planning)    Morbid obesity with body mass index (BMI) of 45.0 to 49.9 in adult (H)    Recurrent major depressive disorder    History of COVID-19    Urinary, incontinence, stress female    Coronary artery disease involving native coronary artery of native heart without angina pectoris    Hx of heart artery stent    Chondromalacia of patella    Disorder of refraction and accommodation    Medial meniscus tear    Impaired gait and mobility    Impairment of balance    Right knee pain    Chronic fatigue    Age-related nuclear cataract, bilateral    Cupping of optic disc, right    Lesion of left upper eyelid    Prediabetes    Colitis    Chronic diastolic heart failure (H)    Personal history of malignant neoplasm of breast     Past Surgical History:   Procedure Laterality Date    BIOPSY      right breast--neg    BIOPSY BREAST Right 02/13/2014    DCIS, LOBULAR CARCINOMA    BIOPSY BREAST NEEDLE LOCALIZATION  2/13/2014    Procedure: BIOPSY BREAST NEEDLE LOCALIZATION;  WIRE LOCALIZED SEGMENTAL RESECTION, RIGHT BREAST;  Surgeon: Shanika Beaver MD;  Location: [facility]    CARDIAC SURGERY      stent    CHOLECYSTECTOMY  2010    COLONOSCOPY  2009    COLONOSCOPY  11/12/2013    DILATION AND CURETTAGE, OPERATIVE HYSTEROSCOPY, COMBINED  03/2017    [facility]    LUMPECTOMY BREAST Right 02/13/2014     DCIS, LOBULAR CARCINOMA    ORTHOPEDIC SURGERY      heel surgery    ORTHOPEDIC SURGERY      rt knee meniiscus tear       Social History     Tobacco Use    Smoking status: Former     Current packs/day: 0.00     Average packs/day: 0.1 packs/day for 38.5 years (3.9 ttl pk-yrs)     Types: Cigarettes     Start date: 8/15/1974     Quit date: 3/3/2013     Years since quittin.3     Passive exposure: Past    Smokeless tobacco: Never   Substance Use Topics    Alcohol use: Yes     Family History   Problem Relation Age of Onset    Hypertension Mother     Cerebrovascular Disease Mother     Breast Cancer Mother 55         age 71 left mastectomy    C.A.D. Father     Coronary Artery Disease Father     Cerebrovascular Disease Father     Prostate Cancer Father     Thyroid Disease Sister     Breast Cancer Maternal Aunt     Thyroid Disease Other     Diabetes No family hx of     Asthma No family hx of     Hyperlipidemia No family hx of     Colon Cancer No family hx of     Other Cancer No family hx of     Anesthesia Reaction No family hx of     Genetic Disorder No family hx of              Current Outpatient Medications   Medication Sig Dispense Refill    acetaminophen (TYLENOL) 650 MG CR tablet Take 650 mg by mouth every 8 hours as needed for mild pain or fever      albuterol (PROAIR HFA/PROVENTIL HFA/VENTOLIN HFA) 108 (90 Base) MCG/ACT inhaler Inhale 2 Puffs into the lungs every four hours as needed for shortness of breath / dyspnea or wheezing 8.5 g 0    amLODIPine (NORVASC) 10 MG tablet Take 10 mg by mouth daily      aspirin (ASA) 81 MG EC tablet Take 1 tablet (81 mg) by mouth daily 90 tablet 11    atorvastatin (LIPITOR) 40 MG tablet Take 1 Tablet by mouth one time a day. 90 tablet 0    calcium-vitamin D (CALTRATE) 600-400 MG-UNIT per tablet Take 1 tablet by mouth 2 times daily      carvedilol (COREG) 12.5 MG tablet Take 12.5 mg by mouth 2 times daily (with meals)      diazepam (VALIUM) 5 MG tablet Take 1-2 tablets (5-10 mg)  by mouth once as directed for anxiety. Take 1 tablet 1 hour prior to arrival time for MRI procedure-take additional tablet if needed just prior to arrival time 2 tablet 0    escitalopram (LEXAPRO) 10 MG tablet Take 1 Tablet by mouth one time a day. Take along with 20mg tablet for a total dose of 30mg. 90 tablet 0    escitalopram (LEXAPRO) 20 MG tablet Take 1 Tablet by mouth one time a day. Take along with 10 mg tablet for a total dose of 30 mg. 90 tablet 0    furosemide (LASIX) 80 MG tablet Take 80 mg by mouth 2 times daily      ibuprofen (ADVIL/MOTRIN) 200 MG tablet Take 200 mg by mouth every 4 hours as needed for pain.      isosorbide mononitrate (IMDUR) 30 MG 24 hr tablet Take 30 mg by mouth daily.      lisinopril (ZESTRIL) 40 MG tablet Take 1 Tablet by mouth one time a day. 90 tablet 2    magnesium oxide 400 MG CAPS Take 400 mg po daily and occasionally every other day      montelukast (SINGULAIR) 10 MG tablet Take 1 tablet (10 mg) by mouth at bedtime. 90 tablet 2    Multiple Vitamin (DAILY MULTIVITAMIN PO) Take one tablet by oral route every day with food      nitroGLYcerin (NITROSTAT) 0.4 MG sublingual tablet Place 1 tablet (0.4 mg) under the tongue every 5 minutes as needed for chest pain. 8 tablet 0    nystatin (MYCOSTATIN) 021341 UNIT/GM external powder Apply 100,000 g topically 2 times daily as needed (Patient taking differently: Apply topically 2 times daily as needed.) 60 g 3    Omega-3 Fatty Acids (OMEGA 3 PO) Take 1,000 mg by mouth daily       potassium chloride ER (KLOR-CON M) 20 MEQ CR tablet Take 20 mEq by mouth daily      spironolactone (ALDACTONE) 25 MG tablet Take 1 tablet by mouth daily      vitamin B-12 (CYANOCOBALAMIN) 1000 MCG tablet Take 1,000 mcg by mouth daily 30 tablet     VITAMIN D, CHOLECALCIFEROL, PO Take 5,000 Units by mouth daily            Allergies   Allergen Reactions    Adhesive Tape      Steri strips left scar.    Latex      Possible allergy.         Recent Labs   Lab Test  10/15/24  1251 07/24/24  1633 06/20/24  1052 04/15/24  1357 12/04/23  1046 06/28/23  1132 07/14/21  1511 04/06/21  1013 02/03/21  0000 01/11/21  1617   A1C  --  5.4  --   --  5.7* 5.5  --   --   --   --    LDL  --  87  --   --  63 53   < > 90  --   --    HDL  --  64  --   --  50 50   < > 59  --   --    TRIG  --  91  --   --  186* 178*   < > 123  --   --    ALT  --  41 26 23 37 38   < > 47  --  35   CR 1.07* 0.99* 0.77 0.91 0.95 0.63   < > 0.58   < > 0.61   GFRESTIMATED 57* 63 85 70 67 >90   < > >90   < > >90   GFRESTBLACK  --   --   --   --   --   --   --  >90  --  >90   POTASSIUM 4.8 4.8 4.3 4.1 4.5 4.0   < > 3.7   < > 3.7   TSH  --  0.40  --   --  1.39 1.39   < > 0.87  --  2.90    < > = values in this interval not displayed.             Current providers sharing in care for this patient include:  Patient Care Team:  Jackie Hitchcock CNP as PCP - General (Family Practice)  Jackie Hitchcock CNP as Assigned PCP  Matilde Patrick NP as Assigned Cancer Care Provider        The following health maintenance items are reviewed in Epic and correct as of today:  Health Maintenance   Topic Date Due    MEDICARE ANNUAL WELLNESS VISIT  04/10/2024    COVID-19 VACCINE (5 - 2024-25 season) 09/01/2024    BMP  04/15/2025    ALT  07/24/2025    LIPID  07/24/2025    INFLUENZA VACCINE (1) 09/01/2025    CBC  10/15/2025    REUBEN ASSESSMENT  01/22/2026    PHQ-9  01/22/2026    MAMMO SCREENING  01/28/2026    FALL RISK ASSESSMENT  07/22/2026    DTAP/TDAP/TD VACCINE (3 - Td or Tdap) 03/17/2027    HF ACTION PLAN  07/24/2027    DIABETES SCREENING  10/15/2027    ADVANCE CARE PLANNING  10/15/2029    COLORECTAL CANCER SCREENING  12/30/2032    DEXA  08/15/2033    TSH W/FREE T4 REFLEX  Completed    DEPRESSION ACTION PLAN  Completed    PNEUMOCOCCAL VACCINE 50+ YEARS  Completed    ZOSTER VACCINE  Completed    RSV VACCINE  Completed    HEPATITIS C SCREENING  Addressed    HPV VACCINE  Aged Out    MENINGITIS VACCINE  Aged Out    PAP  Discontinued    LUNG  "CANCER SCREENING  Discontinued           Review of Systems  Constitutional, HEENT, cardiovascular, pulmonary, GI, , musculoskeletal, neuro, skin, endocrine and psych systems are negative, except as otherwise noted.         Objective    Exam  /62 (BP Location: Left arm, Patient Position: Sitting, Cuff Size: Adult Large)   Pulse 78   Temp 97  F (36.1  C) (Tympanic)   Resp 22   Wt 134 kg (295 lb 6.4 oz)   SpO2 98%   BMI 47.68 kg/m     Estimated body mass index is 47.68 kg/m  as calculated from the following:    Height as of 7/8/25: 1.676 m (5' 6\").    Weight as of this encounter: 134 kg (295 lb 6.4 oz).        Physical Exam  GENERAL: alert and no distress  EYES: Eyes grossly normal to inspection, PERRL and conjunctivae and sclerae normal  HENT: ear canals and TM's normal, nose and mouth without ulcers or lesions  NECK: no adenopathy, no asymmetry, masses, or scars  RESP: lungs clear to auscultation - no rales, rhonchi or wheezes  CV: regular rate and rhythm, normal S1 S2, no S3 or S4, no murmur, click or rub, no peripheral edema  ABDOMEN: soft, nontender, no hepatosplenomegaly, no masses and bowel sounds normal  SKIN: abdominal incisional irritation, erythema no drainage  PSYCH: mentation appears normal, affect normal/bright               7/22/2025   Mini Cog   Clock Draw Score 0 Abnormal   3 Item Recall 2 objects recalled   Mini Cog Total Score 2           The longitudinal plan of care for the diagnosis(es)/condition(s) as documented were addressed during this visit. Due to the added complexity in care, I will continue to support Vandana in the subsequent management and with ongoing continuity of care.            Signed Electronically by: Jackie Hitchcock CNP    "

## 2025-07-23 ENCOUNTER — TELEPHONE (OUTPATIENT)
Dept: SURGERY | Facility: OTHER | Age: 65
End: 2025-07-23

## 2025-07-23 LAB — VIT D+METAB SERPL-MCNC: 41 NG/ML (ref 20–50)

## 2025-07-23 NOTE — CONFIDENTIAL NOTE
July 23, 2025    Power out in [facility]. Patient will call back to reschedule appt with Dr Pace General Surgery    -[facility] on 7/23/2025 at 10:21 AM

## 2025-08-04 ENCOUNTER — LAB (OUTPATIENT)
Dept: LAB | Facility: OTHER | Age: 65
End: 2025-08-04
Payer: MEDICARE

## 2025-08-04 DIAGNOSIS — N28.9 RENAL IMPAIRMENT: ICD-10-CM

## 2025-08-04 DIAGNOSIS — E87.5 HIGH POTASSIUM: ICD-10-CM

## 2025-08-04 LAB
ALBUMIN SERPL BCG-MCNC: 4.3 G/DL (ref 3.5–5.2)
ALP SERPL-CCNC: 77 U/L (ref 40–150)
ALT SERPL W P-5'-P-CCNC: 23 U/L (ref 0–50)
ANION GAP SERPL CALCULATED.3IONS-SCNC: 13 MMOL/L (ref 7–15)
AST SERPL W P-5'-P-CCNC: 22 U/L (ref 0–45)
BILIRUB SERPL-MCNC: 0.5 MG/DL
BUN SERPL-MCNC: 31.7 MG/DL (ref 8–23)
CALCIUM SERPL-MCNC: 9.6 MG/DL (ref 8.8–10.4)
CHLORIDE SERPL-SCNC: 99 MMOL/L (ref 98–107)
CREAT SERPL-MCNC: 1.11 MG/DL (ref 0.51–0.95)
EGFRCR SERPLBLD CKD-EPI 2021: 55 ML/MIN/1.73M2
GLUCOSE SERPL-MCNC: 104 MG/DL (ref 70–99)
HCO3 SERPL-SCNC: 25 MMOL/L (ref 22–29)
POTASSIUM SERPL-SCNC: 4.5 MMOL/L (ref 3.4–5.3)
PROT SERPL-MCNC: 7.6 G/DL (ref 6.4–8.3)
SODIUM SERPL-SCNC: 137 MMOL/L (ref 135–145)

## 2025-08-04 PROCEDURE — 36415 COLL VENOUS BLD VENIPUNCTURE: CPT | Mod: ZL

## 2025-08-04 PROCEDURE — 82374 ASSAY BLOOD CARBON DIOXIDE: CPT | Mod: ZL

## 2025-08-06 ENCOUNTER — OFFICE VISIT (OUTPATIENT)
Dept: SURGERY | Facility: OTHER | Age: 65
End: 2025-08-06
Attending: SURGERY
Payer: MEDICARE

## 2025-08-06 VITALS
WEIGHT: 293 LBS | DIASTOLIC BLOOD PRESSURE: 70 MMHG | HEART RATE: 89 BPM | RESPIRATION RATE: 20 BRPM | SYSTOLIC BLOOD PRESSURE: 120 MMHG | OXYGEN SATURATION: 100 % | BODY MASS INDEX: 47.29 KG/M2

## 2025-08-06 DIAGNOSIS — R23.8 OTHER SKIN CHANGES: Primary | ICD-10-CM

## 2025-08-06 PROCEDURE — G0463 HOSPITAL OUTPT CLINIC VISIT: HCPCS

## 2025-08-06 ASSESSMENT — PAIN SCALES - GENERAL: PAINLEVEL_OUTOF10: MODERATE PAIN (4)

## 2025-08-08 ENCOUNTER — HOSPITAL ENCOUNTER (OUTPATIENT)
Dept: CT IMAGING | Facility: HOSPITAL | Age: 65
Discharge: HOME | End: 2025-08-08
Attending: SURGERY | Admitting: RADIOLOGY
Payer: MEDICARE

## 2025-08-08 DIAGNOSIS — R23.8 OTHER SKIN CHANGES: ICD-10-CM

## 2025-08-08 PROCEDURE — 250N000011 HC RX IP 250 OP 636: Performed by: RADIOLOGY

## 2025-08-08 PROCEDURE — 74177 CT ABD & PELVIS W/CONTRAST: CPT | Mod: 26 | Performed by: RADIOLOGY

## 2025-08-08 PROCEDURE — 74177 CT ABD & PELVIS W/CONTRAST: CPT

## 2025-08-08 RX ORDER — IOPAMIDOL 755 MG/ML
144 INJECTION, SOLUTION INTRAVASCULAR ONCE
Status: COMPLETED | OUTPATIENT
Start: 2025-08-08 | End: 2025-08-08

## 2025-08-08 RX ADMIN — IOPAMIDOL 144 ML: 755 INJECTION, SOLUTION INTRAVENOUS at 11:39

## 2025-08-13 ENCOUNTER — OFFICE VISIT (OUTPATIENT)
Dept: SURGERY | Facility: OTHER | Age: 65
End: 2025-08-13
Attending: SURGERY
Payer: MEDICARE

## 2025-08-13 VITALS
OXYGEN SATURATION: 99 % | BODY MASS INDEX: 47.09 KG/M2 | DIASTOLIC BLOOD PRESSURE: 74 MMHG | SYSTOLIC BLOOD PRESSURE: 120 MMHG | HEART RATE: 93 BPM | HEIGHT: 66 IN | WEIGHT: 293 LBS | RESPIRATION RATE: 20 BRPM

## 2025-08-13 DIAGNOSIS — S31.109A OPEN WOUND OF ABDOMEN, INITIAL ENCOUNTER: ICD-10-CM

## 2025-08-13 DIAGNOSIS — L53.9 ERYTHEMATOUS CONDITION: Primary | ICD-10-CM

## 2025-08-13 PROCEDURE — G0463 HOSPITAL OUTPT CLINIC VISIT: HCPCS

## 2025-08-13 RX ORDER — CLINDAMYCIN HYDROCHLORIDE 300 MG/1
300 CAPSULE ORAL 3 TIMES DAILY
Qty: 21 CAPSULE | Refills: 0 | Status: SHIPPED | OUTPATIENT
Start: 2025-08-13 | End: 2025-08-20

## 2025-08-13 RX ORDER — SACCHAROMYCES BOULARDII 250 MG
250 CAPSULE ORAL 2 TIMES DAILY
Qty: 14 CAPSULE | Refills: 0 | Status: SHIPPED | OUTPATIENT
Start: 2025-08-13 | End: 2025-08-20

## 2025-08-13 ASSESSMENT — PAIN SCALES - GENERAL: PAINLEVEL_OUTOF10: MODERATE PAIN (4)

## 2025-08-20 ENCOUNTER — OFFICE VISIT (OUTPATIENT)
Dept: SURGERY | Facility: OTHER | Age: 65
End: 2025-08-20
Attending: NURSE PRACTITIONER
Payer: MEDICARE

## 2025-08-20 VITALS
SYSTOLIC BLOOD PRESSURE: 118 MMHG | OXYGEN SATURATION: 97 % | HEART RATE: 92 BPM | RESPIRATION RATE: 22 BRPM | HEIGHT: 66 IN | BODY MASS INDEX: 47.09 KG/M2 | WEIGHT: 293 LBS | DIASTOLIC BLOOD PRESSURE: 70 MMHG

## 2025-08-20 DIAGNOSIS — L53.9 ERYTHEMATOUS CONDITION: ICD-10-CM

## 2025-08-20 DIAGNOSIS — S31.109A OPEN WOUND OF ABDOMEN, INITIAL ENCOUNTER: Primary | ICD-10-CM

## 2025-08-20 PROCEDURE — G0463 HOSPITAL OUTPT CLINIC VISIT: HCPCS | Mod: 25

## 2025-08-20 PROCEDURE — G0463 HOSPITAL OUTPT CLINIC VISIT: HCPCS

## 2025-08-20 RX ORDER — SACCHAROMYCES BOULARDII 250 MG
250 CAPSULE ORAL 2 TIMES DAILY
Qty: 14 CAPSULE | Refills: 0 | Status: SHIPPED | OUTPATIENT
Start: 2025-08-20

## 2025-08-20 RX ORDER — CLINDAMYCIN HYDROCHLORIDE 300 MG/1
300 CAPSULE ORAL 3 TIMES DAILY
Qty: 21 CAPSULE | Refills: 0 | Status: SHIPPED | OUTPATIENT
Start: 2025-08-20

## 2025-08-20 ASSESSMENT — PAIN SCALES - GENERAL: PAINLEVEL_OUTOF10: MILD PAIN (3)

## 2025-08-27 ENCOUNTER — OFFICE VISIT (OUTPATIENT)
Dept: SURGERY | Facility: OTHER | Age: 65
End: 2025-08-27
Attending: NURSE PRACTITIONER
Payer: MEDICARE

## 2025-08-27 VITALS
WEIGHT: 293 LBS | RESPIRATION RATE: 22 BRPM | SYSTOLIC BLOOD PRESSURE: 120 MMHG | BODY MASS INDEX: 47.09 KG/M2 | OXYGEN SATURATION: 99 % | DIASTOLIC BLOOD PRESSURE: 72 MMHG | HEART RATE: 89 BPM | HEIGHT: 66 IN

## 2025-08-27 DIAGNOSIS — S31.109A OPEN WOUND OF ABDOMEN, INITIAL ENCOUNTER: ICD-10-CM

## 2025-08-27 DIAGNOSIS — L53.9 ERYTHEMATOUS CONDITION: Primary | ICD-10-CM

## 2025-08-27 PROCEDURE — G0463 HOSPITAL OUTPT CLINIC VISIT: HCPCS

## 2025-08-27 ASSESSMENT — PAIN SCALES - GENERAL: PAINLEVEL_OUTOF10: NO PAIN (0)
